# Patient Record
Sex: FEMALE | NOT HISPANIC OR LATINO | Employment: FULL TIME | URBAN - METROPOLITAN AREA
[De-identification: names, ages, dates, MRNs, and addresses within clinical notes are randomized per-mention and may not be internally consistent; named-entity substitution may affect disease eponyms.]

---

## 2018-03-01 ENCOUNTER — OFFICE VISIT (OUTPATIENT)
Dept: PSYCHIATRY | Facility: CLINIC | Age: 58
End: 2018-03-01
Attending: NURSE PRACTITIONER
Payer: COMMERCIAL

## 2018-03-01 VITALS — WEIGHT: 141.6 LBS | HEART RATE: 87 BPM | SYSTOLIC BLOOD PRESSURE: 129 MMHG | DIASTOLIC BLOOD PRESSURE: 84 MMHG

## 2018-03-01 DIAGNOSIS — F43.10 PTSD (POST-TRAUMATIC STRESS DISORDER): Primary | ICD-10-CM

## 2018-03-01 PROCEDURE — G0463 HOSPITAL OUTPT CLINIC VISIT: HCPCS | Mod: ZF

## 2018-03-01 RX ORDER — DOXYCYCLINE HYCLATE 20 MG
20 TABLET ORAL 2 TIMES DAILY
COMMUNITY

## 2018-03-01 ASSESSMENT — PAIN SCALES - GENERAL: PAINLEVEL: NO PAIN (0)
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
## 2018-03-01 NOTE — NURSING NOTE
"Chief Complaint   Patient presents with     Recheck Medication     PTSD     Reviewed allergies, medications, pharmacy and smoking status.  Administered abuse screening questions     Obtained weight, pain level, blood pressure and heart rate     Patient lost balance while getting weight - says other providers \"dont' seem concerned\" \"my  is really concerned\" \"I haven't fallen.      "

## 2018-03-01 NOTE — MR AVS SNAPSHOT
After Visit Summary   3/1/2018    Preethi Mays    MRN: [id number]           Patient Information     Date Of Birth          1960        Visit Information        Provider Department      3/1/2018 3:30 PM Cherrie Davis APRN [facility]        Today's Diagnoses     PTSD (post-traumatic stress disorder)    -  1       Follow-ups after your visit        Follow-up notes from your care team     Return if symptoms worsen or fail to improve, for Routine Visit.      Who to contact     Please call your clinic at [phone] to:    Ask questions about your health    Make or cancel appointments    Discuss your medicines    Learn about your test results    Speak to your doctor            Additional Information About Your Visit        MyChart Information     Summifyt is an electronic gateway that provides easy, online access to your medical records. With [facility], you can request a clinic appointment, read your test results, renew a prescription or communicate with your care team.     To sign up for Summifyt visit the website at [email or web address]   You will be asked to enter the access code listed below, as well as some personal information. Please follow the directions to create your username and password.     Your access code is: [email or web address]  Expires: 2018 10:59 AM     Your access code will  in 90 days. If you need help or a new code, please contact your [facility] or call [phone] for assistance.        Care EveryWhere ID     This is your Care EveryWhere ID. This could be used by other organizations to access your [facility] medical records  [id number]        Your Vitals Were     Pulse                   87            Blood Pressure from Last 3 Encounters:   18 129/84    Weight from Last 3 Encounters:   18 64.2 kg (141 lb 9.6 oz)              Today, you had the following     No orders found for display       Primary Care  Provider Office Phone # Fax #    [facility] Hathaway [phone]       IVAN RATNA OSCAR [address] [facility] [address] [address]        Equal Access to Services     JYOTI CURRAN : Hadii aad ku hadcalixtoo Soomaali, waaxda luqadaha, qaybta kaalmada adeegyada, waxay idiin hayruslann adeakil blake laphillip will. So [facility] [phone].    ATENCIÓN: Si habla español, tiene a armas disposición servicios gratuitos de asistencia lingüística. Llame al [phone].    We comply with applicable federal civil rights laws and [place] laws. We do not discriminate on the basis of race, color, national origin, age, disability, sex, sexual orientation, or gender identity.            Thank you!     Thank you for choosing PSYCHIATRY CLINIC  for your care. Our goal is always to provide you with excellent care. Hearing back from our patients is one way we can continue to improve our services. Please take a few minutes to complete the written survey that you may receive in the mail after your visit with us. Thank you!             Your Updated Medication List - Protect others around you: Learn how to safely use, store and throw away your medicines at www.disposemymeds.org.          This list is accurate as of 3/1/18 11:59 PM.  Always use your most recent med list.                   Brand Name Dispense Instructions for use Diagnosis    doxycycline 20 MG tablet    PERIOSTAT     Take 20 mg by mouth 2 times daily        MAXALT PO      Take 10 mg by mouth as needed for migraine        metroNIDAZOLE 1 % cream    NORITATE     Apply topically daily

## 2018-03-02 ENCOUNTER — TELEPHONE (OUTPATIENT)
Dept: PSYCHIATRY | Facility: CLINIC | Age: 58
End: 2018-03-02

## 2018-03-02 NOTE — TELEPHONE ENCOUNTER
On 3/1/2018 the patient signed an CASSIDY authorizing the release of records from [facility] to [facility].  I faxed the form to [phone], sent the CASSIDY to scanning and held a copy is Psychiatry until scanning complete/confirmed.Shelley Hutchison/MALLORY

## 2018-03-03 ASSESSMENT — PATIENT HEALTH QUESTIONNAIRE - PHQ9: SUM OF ALL RESPONSES TO PHQ QUESTIONS 1-9: 5

## 2018-03-08 ENCOUNTER — TELEPHONE (OUTPATIENT)
Dept: PSYCHIATRY | Facility: CLINIC | Age: 58
End: 2018-03-08

## 2018-03-08 DIAGNOSIS — F43.10 PTSD (POST-TRAUMATIC STRESS DISORDER): Primary | ICD-10-CM

## 2018-03-08 RX ORDER — BUSPIRONE HYDROCHLORIDE 5 MG/1
5 TABLET ORAL 2 TIMES DAILY
Qty: 60 TABLET | Refills: 0 | Status: SHIPPED | OUTPATIENT
Start: 2018-03-08 | End: 2018-04-05

## 2018-03-08 NOTE — TELEPHONE ENCOUNTER
----- Message from RICHARD Cantu CNP sent at 3/8/2018  5:39 PM CST -----  Regarding: RE: pt update/question  Contact: [phone]  Left Whit a detailed message to take buspar 5mg BID and if she needed to, she could take 5mg (0.5) BID x 3 days then retrial full tab BID. Because she is nervous and psychotropic naive, started her lower than usual.    Asked her to carefully observe vs obsess her tolerability and  response to the med, something that's hard for those of us who manage anxiety. Asked her to be patient in the process and take consistently for several weeks before she might expect to feel differently.    Asked her to call intake to reschedule in 3-3.5 weeks for med visit. Invited a call back if she had questions.    ----- Message -----     From: Kely Colby RN     Sent: 3/8/2018   4:51 PM       To: RICHARD Cantu CNP  Subject: FW: pt update/question                           Thank you again!!!    ----- Message -----     From: Marie Schwab     Sent: 3/7/2018   3:20 PM       To: Kely Colby RN  Subject: pt update/question                               Pt decided to go ahead with Buspar, hasn't started taking it, was wondering if she needs to talk to Cherrie first. Okay to leave a detailed vm.

## 2018-04-05 ENCOUNTER — OFFICE VISIT (OUTPATIENT)
Dept: PSYCHIATRY | Facility: CLINIC | Age: 58
End: 2018-04-05
Attending: NURSE PRACTITIONER
Payer: COMMERCIAL

## 2018-04-05 VITALS — SYSTOLIC BLOOD PRESSURE: 129 MMHG | HEART RATE: 83 BPM | DIASTOLIC BLOOD PRESSURE: 83 MMHG | WEIGHT: 140.8 LBS

## 2018-04-05 DIAGNOSIS — F43.10 PTSD (POST-TRAUMATIC STRESS DISORDER): ICD-10-CM

## 2018-04-05 PROCEDURE — G0463 HOSPITAL OUTPT CLINIC VISIT: HCPCS | Mod: ZF

## 2018-04-05 RX ORDER — BUSPIRONE HYDROCHLORIDE 5 MG/1
5 TABLET ORAL 2 TIMES DAILY
Qty: 60 TABLET | Refills: 1 | Status: SHIPPED | OUTPATIENT
Start: 2018-04-05 | End: 2018-05-17

## 2018-04-05 ASSESSMENT — PAIN SCALES - GENERAL: PAINLEVEL: NO PAIN (0)

## 2018-04-05 NOTE — NURSING NOTE
Chief Complaint   Patient presents with     Recheck Medication      PTSD (post-traumatic stress disorder)
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
## 2018-04-05 NOTE — MR AVS SNAPSHOT
After Visit Summary   2018    Preethi Mays    MRN: [id number]           Patient Information     Date Of Birth          1960        Visit Information        Provider Department      2018 3:00 PM Cherrie Davis APRN CNP Psychiatry Clinic        Today's Diagnoses     PTSD (post-traumatic stress disorder)           Follow-ups after your visit        Follow-up notes from your care team     Return in about 6 weeks (around 2018), or if symptoms worsen or fail to improve, for Routine Visit.      Your next 10 appointments already scheduled     May 17, 2018  2:00 PM CDT   Adult Med Follow UP with RICHARD Cantu CNP   Psychiatry Clinic ([facility])    [place] [id number] [address] [phone]              Who to contact     Please call your clinic at [phone] to:    Ask questions about your health    Make or cancel appointments    Discuss your medicines    Learn about your test results    Speak to your doctor            Additional Information About Your Visit        MyChart Information     Myrl is an electronic gateway that provides easy, online access to your medical records. With Myrl, you can request a clinic appointment, read your test results, renew a prescription or communicate with your care team.     To sign up for Hector Beveragest visit the website at [email or web address]   You will be asked to enter the access code listed below, as well as some personal information. Please follow the directions to create your username and password.     Your access code is: [email or web address]  Expires: 2018 11:59 AM     Your access code will  in 90 days. If you need help or a new code, please contact your [facility] or call [phone] for assistance.        Care EveryWhere ID     This is your Care EveryWhere ID. This could be used by other organizations to access your  [facility] medical records  [id number]        Your Vitals Were     Pulse                   83            Blood Pressure from Last 3 Encounters:   04/05/18 129/83   03/01/18 129/84    Weight from Last 3 Encounters:   04/05/18 63.9 kg (140 lb 12.8 oz)   03/01/18 64.2 kg (141 lb 9.6 oz)              Today, you had the following     No orders found for display         Where to get your medicines      These medications were sent to [facility] #[id number] - [place], [place] - [address]  [address] [facility] [address]     Phone:  [phone]     busPIRone 5 MG tablet          Primary Care Provider Office Phone # Fax #    Octaviano Hathaway -[phone]       IVAN MOORE [address] [facility] [address]  [facility] [address]        Equal Access to Services     [facility]: Hadii aad ku hadasho Soomaali, waaxda luqadaha, qaybta kaalmada adeegyada, waxay garretin hayaan leida santiago . So [facility] [phone].    ATENCIÓN: Si habla español, tiene a armas disposición servicios gratuitos de asistencia lingüística. Catherine al [phone].    We comply with applicable federal civil rights laws and [place] laws. We do not discriminate on the basis of race, color, national origin, age, disability, sex, sexual orientation, or gender identity.            Thank you!     Thank you for choosing PSYCHIATRY CLINIC  for your care. Our goal is always to provide you with excellent care. Hearing back from our patients is one way we can continue to improve our services. Please take a few minutes to complete the written survey that you may receive in the mail after your visit with us. Thank you!             Your Updated Medication List - Protect others around you: Learn how to safely use, store and throw away your medicines at www.disposemymeds.org.          This list is accurate as of 4/5/18 11:59 PM.  Always use your most recent med list.                   Brand Name Dispense Instructions for use  Diagnosis    busPIRone 5 MG tablet    BUSPAR    60 tablet    Take 1 tablet (5 mg) by mouth 2 times daily    PTSD (post-traumatic stress disorder)       doxycycline 20 MG tablet    PERIOSTAT     Take 20 mg by mouth 2 times daily        MAXALT PO      Take 10 mg by mouth as needed for migraine        metroNIDAZOLE 1 % cream    NORITATE     Apply topically daily

## 2018-04-06 ASSESSMENT — PATIENT HEALTH QUESTIONNAIRE - PHQ9: SUM OF ALL RESPONSES TO PHQ QUESTIONS 1-9: 3

## 2018-05-17 ENCOUNTER — OFFICE VISIT (OUTPATIENT)
Dept: PSYCHIATRY | Facility: CLINIC | Age: 58
End: 2018-05-17
Attending: NURSE PRACTITIONER
Payer: COMMERCIAL

## 2018-05-17 VITALS — DIASTOLIC BLOOD PRESSURE: 65 MMHG | SYSTOLIC BLOOD PRESSURE: 107 MMHG | HEART RATE: 82 BPM | WEIGHT: 141.8 LBS

## 2018-05-17 DIAGNOSIS — F43.10 PTSD (POST-TRAUMATIC STRESS DISORDER): ICD-10-CM

## 2018-05-17 PROCEDURE — G0463 HOSPITAL OUTPT CLINIC VISIT: HCPCS | Mod: ZF

## 2018-05-17 RX ORDER — BUSPIRONE HYDROCHLORIDE 5 MG/1
5 TABLET ORAL 2 TIMES DAILY
Qty: 60 TABLET | Refills: 2 | Status: SHIPPED | OUTPATIENT
Start: 2018-05-17 | End: 2018-07-02

## 2018-05-17 ASSESSMENT — PAIN SCALES - GENERAL: PAINLEVEL: NO PAIN (0)

## 2018-05-17 NOTE — MR AVS SNAPSHOT
After Visit Summary   5/17/2018    Preethi Mays    MRN: [id number]           Patient Information     Date Of Birth          1960        Visit Information        Provider Department      5/17/2018 2:00 PM Cherrie Davis APRN [facility]        Today's Diagnoses     PTSD (post-traumatic stress disorder)          Care Instructions    Thank you for coming to the PSYCHIATRY CLINIC.    Lab Testing:  If you had lab testing today and your results are reassuring or normal they will be mailed to you or sent through [email or web address] within 7 days.   If the lab tests need quick action we will call you with the results.  The phone number we will call with results is # [phone] (home) . If this is not the best number please call our clinic and change the number.    Medication Refills:  If you need any refills please call your pharmacy and they will contact us. Our fax number for refills is [phone]. Please allow three business for refill processing.   If you need to  your refill at a new pharmacy, please contact the new pharmacy directly. The new pharmacy will help you get your medications transferred.     Scheduling:  If you have any concerns about today's visit or wish to schedule another appointment please call our office during normal business hours [phone] (8-5:00 M-F)    Contact Us:  Please call [phone] during business hours (8-5:00 M-F).  If after clinic hours, or on the weekend, please call  [phone].    Financial Assistance [phone]  [employer] Billing [phone]  [facility] Billing [phone]  Medical Records [phone]      MENTAL HEALTH CRISIS NUMBERS:  [facility]:   [facility] - [phone]   Crisis Residence hospitals - [facility] - [phone]   Walk-In Counseling Center hospitals - [phone]   COPE 24/7 [place] Mobile Team for Adults - [[phone]]; Child - [[phone]]     Crisis Connection - [phone]      [facility]:   [facility] - [phone]   Walk-in counseling [facility] - [phone]   Walk-in counseling [facility] - [phone]   Crisis Residence [facility] - [phone]   Urgent Care Adult Mental Health:   --Drop-in, 24/7 crisis line, and [place] Mobile Team [[phone]]    CRISIS TEXT LINE: Text [phone] from anywhere, anytime, any crisis 24/7;    OR SEE www.crisistextline.org     Poison Control Center - [phone]    CHILD: [facility] needs assessment team - [phone]     [facility] Lifeline - [phone]; or [facility] Lifeline - [phone]    If you have a medical emergency please call 911or go to the nearest ER.                    _____________________________________________    Again thank you for choosing PSYCHIATRY CLINIC and please let us know how we can best partner with you to improve you and your family's health.  You may be receiving a survey in the mail regarding this appointment. We would love to have your feedback, both positive and negative, so please fill out the survey and return it using the provided envolpe. The survey is done by an external company, so your answers are anonymous.             Follow-ups after your visit        Your next 10 appointments already scheduled     Aug 20, 2018 10:30 AM CDT   Adult Med Follow UP with RICHARD Cantu CNP   Psychiatry Clinic ([facility])    [address] [place] [address] [phone]              Who to contact     Please call your clinic at [phone] to:    Ask questions about your health    Make or cancel appointments    Discuss your medicines    Learn about your test results    Speak to your doctor            Additional Information About Your Visit        MyChart Information     MEDNAX is an electronic gateway that provides easy, online access to your medical records. With  [employer], you can request a clinic appointment, read your test results, renew a prescription or communicate with your care team.     To sign up for [employer] visit the website at [email or web address]   You will be asked to enter the access code listed below, as well as some personal information. Please follow the directions to create your username and password.     Your access code is: [email or web address]  Expires: 2018 11:59 AM     Your access code will  in 90 days. If you need help or a new code, please contact your [facility] or call [phone] for assistance.        Care EveryWhere ID     This is your Care EveryWhere ID. This could be used by other organizations to access your [facility] medical records  [id number]        Your Vitals Were     Pulse                   82            Blood Pressure from Last 3 Encounters:   18 107/65   18 129/83   18 129/84    Weight from Last 3 Encounters:   18 64.3 kg (141 lb 12.8 oz)   18 63.9 kg (140 lb 12.8 oz)   18 64.2 kg (141 lb 9.6 oz)              Today, you had the following     No orders found for display         Where to get your medicines      These medications were sent to [facility] #[id number] - [facility] [address]   [address] [facility] [address]     Phone:  [phone]     busPIRone 5 MG tablet          Primary Care Provider Office Phone # Fax #    Octaviano Hathaway [phone]       IVAN MOORE [address] [facility] [address]  [facility] [address]        Equal Access to Services     [facility]: Hadii alem Eddy, wagoyoda luqadaha, qaybta kaalmada bev, delores will. So [facility] [phone].    ATENCIÓN: Si habla español, tiene a armas disposición servicios gratuitos de asistencia lingüística. Llame al [phone].    We comply with applicable federal civil rights laws and [place] laws. We do not  discriminate on the basis of race, color, national origin, age, disability, sex, sexual orientation, or gender identity.            Thank you!     Thank you for choosing PSYCHIATRY CLINIC  for your care. Our goal is always to provide you with excellent care. Hearing back from our patients is one way we can continue to improve our services. Please take a few minutes to complete the written survey that you may receive in the mail after your visit with us. Thank you!             Your Updated Medication List - Protect others around you: Learn how to safely use, store and throw away your medicines at www.disposemymeds.org.          This list is accurate as of 5/17/18  2:40 PM.  Always use your most recent med list.                   Brand Name Dispense Instructions for use Diagnosis    busPIRone 5 MG tablet    BUSPAR    60 tablet    Take 1 tablet (5 mg) by mouth 2 times daily    PTSD (post-traumatic stress disorder)       doxycycline 20 MG tablet    PERIOSTAT     Take 20 mg by mouth 2 times daily        MAXALT PO      Take 10 mg by mouth as needed for migraine        metroNIDAZOLE 1 % cream    NORITATE     Apply topically daily

## 2018-05-17 NOTE — PATIENT INSTRUCTIONS
Thank you for coming to the PSYCHIATRY CLINIC.    Lab Testing:  If you had lab testing today and your results are reassuring or normal they will be mailed to you or sent through Vide[employer]ot within 7 days.   If the lab tests need quick action we will call you with the results.  The phone number we will call with results is # [phone] (home) . If this is not the best number please call our clinic and change the number.    Medication Refills:  If you need any refills please call your pharmacy and they will contact us. Our fax number for refills is [phone]. Please allow three business for refill processing.   If you need to  your refill at a new pharmacy, please contact the new pharmacy directly. The new pharmacy will help you get your medications transferred.     Scheduling:  If you have any concerns about today's visit or wish to schedule another appointment please call our office during normal business hours [phone] (8-5:00 M-F)    Contact Us:  Please call [phone] during business hours (8-5:00 M-F).  If after clinic hours, or on the weekend, please call  [phone].    Financial Assistance [phone]  [employer] Billing [phone]  [employer] Billing [phone]  Medical Records [phone]      MENTAL HEALTH CRISIS NUMBERS:  [facility]:   [facility] - [phone]   Crisis Residence [facility] - [phone]   Walk-In Counseling [facility] [phone]   COPE 24/7 [place] Mobile Team for Adults - [[phone]]; Child - [[phone]]     Crisis Connection - [phone]     [facility]:   [facility] - [phone]   Walk-in counseling [facility] - [phone]   Walk-in counseling [facility] - [phone]   Crisis Residence [facility] - [phone]   Urgent Care Adult Mental Health:   --Drop-in, 24/7 crisis line, and [place] Mobile Team [[phone]]    CRISIS TEXT  LINE: Text [phone] from anywhere, anytime, any crisis 24/7;    OR SEE www.crisistextline.org     Poison Control Center - [phone]    CHILD: [facility] needs assessment team - [phone]     [facility] - [phone]; or [employer] Lifeline - [phone]    If you have a medical emergency please call 911or go to the nearest ER.                    _____________________________________________    Again thank you for choosing PSYCHIATRY CLINIC and please let us know how we can best partner with you to improve you and your family's health.  You may be receiving a survey in the mail regarding this appointment. We would love to have your feedback, both positive and negative, so please fill out the survey and return it using the provided envolpe. The survey is done by an external company, so your answers are anonymous.
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
## 2018-05-19 ASSESSMENT — PATIENT HEALTH QUESTIONNAIRE - PHQ9: SUM OF ALL RESPONSES TO PHQ QUESTIONS 1-9: 4

## 2018-06-29 ENCOUNTER — TELEPHONE (OUTPATIENT)
Dept: PSYCHIATRY | Facility: CLINIC | Age: 58
End: 2018-06-29

## 2018-06-29 NOTE — TELEPHONE ENCOUNTER
Message  Received: Today       Cherrie Davis APRN CNP Pratt, Laura, RN       Cc: Cherrie Davis APRN CNP       Caller: Unspecified (Today, 10:17 AM)                     I'll call her Monday.

## 2018-07-02 DIAGNOSIS — F43.10 PTSD (POST-TRAUMATIC STRESS DISORDER): ICD-10-CM

## 2018-07-02 RX ORDER — BUSPIRONE HYDROCHLORIDE 7.5 MG/1
7.5 TABLET ORAL 2 TIMES DAILY
Qty: 60 TABLET | Refills: 1 | Status: SHIPPED | OUTPATIENT
Start: 2018-07-02 | End: 2018-08-24
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
## 2018-08-24 ENCOUNTER — OFFICE VISIT (OUTPATIENT)
Dept: PSYCHIATRY | Facility: CLINIC | Age: 58
End: 2018-08-24
Attending: NURSE PRACTITIONER
Payer: COMMERCIAL

## 2018-08-24 VITALS — DIASTOLIC BLOOD PRESSURE: 75 MMHG | HEART RATE: 81 BPM | SYSTOLIC BLOOD PRESSURE: 119 MMHG | WEIGHT: 142 LBS

## 2018-08-24 DIAGNOSIS — F43.10 PTSD (POST-TRAUMATIC STRESS DISORDER): ICD-10-CM

## 2018-08-24 PROCEDURE — G0463 HOSPITAL OUTPT CLINIC VISIT: HCPCS | Mod: ZF

## 2018-08-24 RX ORDER — BUSPIRONE HYDROCHLORIDE 7.5 MG/1
7.5 TABLET ORAL 2 TIMES DAILY
Qty: 60 TABLET | Refills: 2 | Status: SHIPPED | OUTPATIENT
Start: 2018-08-24 | End: 2018-11-16

## 2018-08-24 ASSESSMENT — PAIN SCALES - GENERAL: PAINLEVEL: NO PAIN (0)

## 2018-08-24 NOTE — PROGRESS NOTES
Outpatient Psychiatry Progress Note     Provider: RICHARD Gaines CNP  Date: 2018  Therapist: Samantha Bermudez  Service:  Medication management.   Patient Identification: Preethi Mays  : 1960   MRN: [id number]    Preethi Mays is a 57 year old female who presents for ongoing psychiatric care.  Preethi was last seen in clinic on 18 and at that time, she chose to continue buspar 5mg BID.    On 18, she chose to increase buspar to 7.5mg BID.    2018  Today Preethi reports she is well.  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 [place]  - good support from her   - walks her dog for 35-40 min daily, likes elliptical > biking    Compliance: daily, uses pill divider  Side effects: denies    Psychiatric Review of Systems:  Depression: denies  Anxiety: less irritability, fewer racing thoughts at night  PTSD: tearful discussing EMDR and past trauma     Lethality: denies    Review of Medical Systems:  Appetite: OK, weight stable  Sleep: improved, sleeping longer (7 hours) and more restfully  Self Care: encouraged, enjoys being creative, exercise, sings opera  Energy: intact  Concentration: intact    Current Substance Use:    Social History     Social History     Marital status:      Spouse name: N/A     Number of children: N/A     Years of education: N/A     Social History Main Topics     Smoking status: Never Smoker      Smokeless tobacco: Never Used     Alcohol use None     Drug use: None     Sexual activity: Not Asked     Other Topics Concern     None     Social History Narrative     Limits alcohol. Continues limiting coffee to promote sleep.    Past Medical History: No past medical history on file.    Medical health update: none today    Allergies:   Allergies   Allergen Reactions     Keflex [Cephalexin]      Penicillins      Sulfa Drugs         Current Medications     Current Outpatient Prescriptions Ordered in [facility]   Medication Sig Dispense Refill     busPIRone (BUSPAR) 7.5 MG tablet Take 1 tablet (7.5 mg) by mouth 2 times daily 60 tablet 1     doxycycline (PERIOSTAT) 20 MG tablet Take 20 mg by mouth 2 times daily       metroNIDAZOLE (NORITATE) 1 % cream Apply topically daily       Rizatriptan Benzoate (MAXALT PO) Take 10 mg by mouth as needed for migraine       No current [facility]-ordered facility-administered medications on file.       Mental Status Exam     Appearance:  Casually dressed, Well groomed, Dressed appropriately for weather and Appears stated age  Behavior/relationship to examiner/demeanor:  Cooperative, Engaged and Pleasant  Orientation: Oriented to person, place, time and situation  Psychomotor: WNL  Speech Rate:  Normal  Speech Spontaneity:  Normal  Mood:  better  Affect:  Appropriate/mood-congruent and Bright  Thought Process (Associations):  Goal directed  Thought Content:  no evidence of suicidal or homicidal ideation, no overt psychosis, denies suicidal ideation, intent or thoughts, patient denies auditory and visual hallucinations, patient does not appear to be responding to internal stimuli and denies suicidal intent or plan  Abnormal Perception:  None  Attention/Concentration:  Normal  Language:  Intact  Insight:  Good and Fair  Judgment:  Good      Results     Vital signs: /75  Pulse 81  Wt 64.4 kg (142 lb)    Laboratory Data:   Lab Results   Component Value Date    WBC 12.5 (H) 01/27/2010    HGB  8.9 (L) 01/29/2010    HCT 32.1 (L) 01/27/2010     01/27/2010     01/27/2010    BUN 19 01/27/2010    CO2 25 01/27/2010    INR 1.01 01/27/2010     Assessment & Plan     Preethi is seen today for follow up.       Diagnosis  Axis 1: PTSD  Axis 2: none      Plan:  Medication: continue buspar 7.5mg BID  OTC Recommendations: none  Other: none  Lab Orders: none  Referrals: active in weekly therapy, EMDR with Samantha Bermudez  Release of Information: none  Future Treatment Considerations: monitor tolerability and efficacy  Return for Follow Up: 12 weeks, sooner as needed    She voices understanding of the treatment plan including discussion of options, risks/ benefits. She has clinic contact information and will seek emergency services if urgent or life threatening symptoms present. Preethi understands risks associated with drug and alcohol use.     Visit was spent counseling the patient and/or coordinating care regarding review of social and occupational functioning.  In addition patient was counseled on health and wellness practices to augment medication treatment of symptoms. See note for details.    PHQ-9 score:  3  PHQ-9 SCORE 5/17/2018   Total Score 4     GAD7: No flowsheet data found.  : 3/2018  Cherrie Davis, APRN CNP 8/24/2018

## 2018-08-24 NOTE — MR AVS SNAPSHOT
After Visit Summary   2018    Preethi Mays    MRN: [id number]           Patient Information     Date Of Birth          1960        Visit Information        Provider Department      2018 8:30 AM Cherrie Davis APRN CNP Psychiatry Clinic        Today's Diagnoses     PTSD (post-traumatic stress disorder)           Follow-ups after your visit        Follow-up notes from your care team     Return in about 12 weeks (around 2018), or if symptoms worsen or fail to improve, for Routine Visit.      Your next 10 appointments already scheduled     2018  9:00 AM CST   Adult Med Follow UP with RICHARD Cantu CNP   Psychiatry Clinic ([facility])    [place] [id number] [address] [phone]              Who to contact     Please call your clinic at [phone] to:    Ask questions about your health    Make or cancel appointments    Discuss your medicines    Learn about your test results    Speak to your doctor            Additional Information About Your Visit        MyChart Information     [employer] is an electronic gateway that provides easy, online access to your medical records. With [employer], you can request a clinic appointment, read your test results, renew a prescription or communicate with your care team.     To sign up for [employer] visit the website at [email or web address]   You will be asked to enter the access code listed below, as well as some personal information. Please follow the directions to create your username and password.     Your access code is: [email or web address]  Expires: 2018  8:26 AM     Your access code will  in 90 days. If you need help or a new code, please contact your [facility] or call [phone] for assistance.        Care EveryWhere ID     This is your Care EveryWhere ID. This could be used by other organizations to access  your [place] medical records  [id number]        Your Vitals Were     Pulse                   81            Blood Pressure from Last 3 Encounters:   08/24/18 119/75   05/17/18 107/65   04/05/18 129/83    Weight from Last 3 Encounters:   08/24/18 64.4 kg (142 lb)   05/17/18 64.3 kg (141 lb 12.8 oz)   04/05/18 63.9 kg (140 lb 12.8 oz)              Today, you had the following     No orders found for display         Where to get your medicines      These medications were sent to [id number] - [place], [place] - [address]  [address], [facility] [address]     Phone:  [phone]     busPIRone 7.5 MG tablet          Primary Care Provider Office Phone # Fax #    Octaviano Hathwaay [phone]       [facility] MO[facility] [address]  [place] [address]        Equal Access to Services     [facility] : Hadii aad ku hadasho Soomaali, waaxda luqadaha, qaybta kaalmada adeegyada, waxay idiin hayaan leida santiago . So [facility] [phone].    ATENCIÓN: Si habla español, tiene a armas disposición servicios gratuitos de asistencia lingüística. [facility] [phone].    We comply with applicable federal civil rights laws and [place] laws. We do not discriminate on the basis of race, color, national origin, age, disability, sex, sexual orientation, or gender identity.            Thank you!     Thank you for choosing PSYCHIATRY CLINIC  for your care. Our goal is always to provide you with excellent care. Hearing back from our patients is one way we can continue to improve our services. Please take a few minutes to complete the written survey that you may receive in the mail after your visit with us. Thank you!             Your Updated Medication List - Protect others around you: Learn how to safely use, store and throw away your medicines at www.disposemymeds.org.          This list is accurate as of 8/24/18 11:59 PM.  Always use your most recent med list.                    Brand Name Dispense Instructions for use Diagnosis    busPIRone 7.5 MG tablet    BUSPAR    60 tablet    Take 1 tablet (7.5 mg) by mouth 2 times daily    PTSD (post-traumatic stress disorder)       doxycycline 20 MG tablet    PERIOSTAT     Take 20 mg by mouth 2 times daily        MAXALT PO      Take 10 mg by mouth as needed for migraine        metroNIDAZOLE 1 % cream    NORITATE     Apply topically daily

## 2018-08-25 ASSESSMENT — PATIENT HEALTH QUESTIONNAIRE - PHQ9: SUM OF ALL RESPONSES TO PHQ QUESTIONS 1-9: 3

## 2018-09-01 ENCOUNTER — HEALTH MAINTENANCE LETTER (OUTPATIENT)
Age: 58
End: 2018-09-01

## 2018-10-13 ENCOUNTER — APPOINTMENT (OUTPATIENT)
Dept: GENERAL RADIOLOGY | Facility: CLINIC | Age: 58
End: 2018-10-13
Attending: EMERGENCY MEDICINE
Payer: COMMERCIAL

## 2018-10-13 ENCOUNTER — HOSPITAL ENCOUNTER (OUTPATIENT)
Facility: CLINIC | Age: 58
Setting detail: OBSERVATION
Discharge: HOME | End: 2018-10-14
Attending: EMERGENCY MEDICINE | Admitting: INTERNAL MEDICINE
Payer: COMMERCIAL

## 2018-10-13 DIAGNOSIS — K59.00 CONSTIPATION, UNSPECIFIED CONSTIPATION TYPE: Primary | ICD-10-CM

## 2018-10-13 DIAGNOSIS — S82.891A CLOSED FRACTURE OF RIGHT ANKLE, INITIAL ENCOUNTER: ICD-10-CM

## 2018-10-13 DIAGNOSIS — S62.102A CLOSED FRACTURE OF LEFT WRIST, INITIAL ENCOUNTER: ICD-10-CM

## 2018-10-13 PROBLEM — G43.009 MIGRAINE WITHOUT AURA: Status: ACTIVE | Noted: 2018-10-13

## 2018-10-13 PROBLEM — S62.109A WRIST FRACTURE: Status: ACTIVE | Noted: 2018-10-13

## 2018-10-13 PROCEDURE — 99285 EMERGENCY DEPT VISIT HI MDM: CPT | Mod: 25

## 2018-10-13 PROCEDURE — 99152 MOD SED SAME PHYS/QHP 5/>YRS: CPT

## 2018-10-13 PROCEDURE — 40000986 XR WRIST LT  2 VW: Mod: LT

## 2018-10-13 PROCEDURE — G0378 HOSPITAL OBSERVATION PER HR: HCPCS

## 2018-10-13 PROCEDURE — 25000128 H RX IP 250 OP 636: Performed by: EMERGENCY MEDICINE

## 2018-10-13 PROCEDURE — 96374 THER/PROPH/DIAG INJ IV PUSH: CPT | Mod: 59

## 2018-10-13 PROCEDURE — 25605 CLTX DST RDL FX/EPHYS SEP W/: CPT | Mod: LT

## 2018-10-13 PROCEDURE — 73610 X-RAY EXAM OF ANKLE: CPT | Mod: RT

## 2018-10-13 PROCEDURE — 29515 APPLICATION SHORT LEG SPLINT: CPT | Mod: RT

## 2018-10-13 PROCEDURE — 99220 ZZC INITIAL OBSERVATION CARE,LEVL III: CPT | Performed by: INTERNAL MEDICINE

## 2018-10-13 PROCEDURE — 96375 TX/PRO/DX INJ NEW DRUG ADDON: CPT | Mod: 59

## 2018-10-13 PROCEDURE — 73110 X-RAY EXAM OF WRIST: CPT | Mod: LT

## 2018-10-13 RX ORDER — PROPOFOL 10 MG/ML
120 INJECTION, EMULSION INTRAVENOUS ONCE
Status: COMPLETED | OUTPATIENT
Start: 2018-10-13 | End: 2018-10-13

## 2018-10-13 RX ORDER — AMOXICILLIN 250 MG
1 CAPSULE ORAL 2 TIMES DAILY PRN
Status: DISCONTINUED | OUTPATIENT
Start: 2018-10-13 | End: 2018-10-14 | Stop reason: HOSPADM

## 2018-10-13 RX ORDER — PROPOFOL 10 MG/ML
INJECTION, EMULSION INTRAVENOUS
Status: DISCONTINUED
Start: 2018-10-13 | End: 2018-10-13 | Stop reason: HOSPADM

## 2018-10-13 RX ORDER — HYDROCODONE BITARTRATE AND ACETAMINOPHEN 5; 325 MG/1; MG/1
1-2 TABLET ORAL EVERY 4 HOURS PRN
Status: DISCONTINUED | OUTPATIENT
Start: 2018-10-13 | End: 2018-10-14 | Stop reason: HOSPADM

## 2018-10-13 RX ORDER — ONDANSETRON 2 MG/ML
4 INJECTION INTRAMUSCULAR; INTRAVENOUS EVERY 6 HOURS PRN
Status: DISCONTINUED | OUTPATIENT
Start: 2018-10-13 | End: 2018-10-14 | Stop reason: HOSPADM

## 2018-10-13 RX ORDER — NALOXONE HYDROCHLORIDE 0.4 MG/ML
.1-.4 INJECTION, SOLUTION INTRAMUSCULAR; INTRAVENOUS; SUBCUTANEOUS
Status: DISCONTINUED | OUTPATIENT
Start: 2018-10-13 | End: 2018-10-14 | Stop reason: HOSPADM

## 2018-10-13 RX ORDER — BUSPIRONE HYDROCHLORIDE 7.5 MG/1
7.5 TABLET ORAL 2 TIMES DAILY
Status: DISCONTINUED | OUTPATIENT
Start: 2018-10-13 | End: 2018-10-14 | Stop reason: HOSPADM

## 2018-10-13 RX ORDER — CHLORAL HYDRATE 500 MG
1 CAPSULE ORAL DAILY
COMMUNITY
End: 2021-11-05

## 2018-10-13 RX ORDER — NAPROXEN SODIUM 220 MG
440 TABLET ORAL DAILY PRN
Status: ON HOLD | COMMUNITY
End: 2018-10-14

## 2018-10-13 RX ORDER — CYCLOBENZAPRINE HCL 5 MG
5 TABLET ORAL DAILY PRN
COMMUNITY

## 2018-10-13 RX ORDER — HYDROMORPHONE HYDROCHLORIDE 1 MG/ML
0.5 INJECTION, SOLUTION INTRAMUSCULAR; INTRAVENOUS; SUBCUTANEOUS ONCE
Status: COMPLETED | OUTPATIENT
Start: 2018-10-13 | End: 2018-10-13

## 2018-10-13 RX ORDER — ACETAMINOPHEN 650 MG/1
650 SUPPOSITORY RECTAL EVERY 4 HOURS PRN
Status: DISCONTINUED | OUTPATIENT
Start: 2018-10-13 | End: 2018-10-14 | Stop reason: HOSPADM

## 2018-10-13 RX ORDER — AMOXICILLIN 250 MG
2 CAPSULE ORAL 2 TIMES DAILY PRN
Status: DISCONTINUED | OUTPATIENT
Start: 2018-10-13 | End: 2018-10-14 | Stop reason: HOSPADM

## 2018-10-13 RX ORDER — DOXYCYCLINE HYCLATE 20 MG
20 TABLET ORAL 2 TIMES DAILY
Status: DISCONTINUED | OUTPATIENT
Start: 2018-10-13 | End: 2018-10-14 | Stop reason: HOSPADM

## 2018-10-13 RX ORDER — ACETAMINOPHEN 325 MG/1
650 TABLET ORAL EVERY 4 HOURS PRN
Status: DISCONTINUED | OUTPATIENT
Start: 2018-10-13 | End: 2018-10-14 | Stop reason: HOSPADM

## 2018-10-13 RX ORDER — ONDANSETRON 2 MG/ML
INJECTION INTRAMUSCULAR; INTRAVENOUS
Status: DISCONTINUED
Start: 2018-10-13 | End: 2018-10-13 | Stop reason: HOSPADM

## 2018-10-13 RX ORDER — ONDANSETRON 4 MG/1
4 TABLET, ORALLY DISINTEGRATING ORAL EVERY 6 HOURS PRN
Status: DISCONTINUED | OUTPATIENT
Start: 2018-10-13 | End: 2018-10-14 | Stop reason: HOSPADM

## 2018-10-13 RX ORDER — HYDROMORPHONE HYDROCHLORIDE 1 MG/ML
0.5 INJECTION, SOLUTION INTRAMUSCULAR; INTRAVENOUS; SUBCUTANEOUS
Status: DISCONTINUED | OUTPATIENT
Start: 2018-10-13 | End: 2018-10-14 | Stop reason: HOSPADM

## 2018-10-13 RX ORDER — ONDANSETRON 2 MG/ML
4 INJECTION INTRAMUSCULAR; INTRAVENOUS ONCE
Status: COMPLETED | OUTPATIENT
Start: 2018-10-13 | End: 2018-10-13

## 2018-10-13 RX ADMIN — ONDANSETRON 4 MG: 2 INJECTION INTRAMUSCULAR; INTRAVENOUS at 21:34

## 2018-10-13 RX ADMIN — HYDROMORPHONE HYDROCHLORIDE 0.5 MG: 1 INJECTION, SOLUTION INTRAMUSCULAR; INTRAVENOUS; SUBCUTANEOUS at 20:38

## 2018-10-13 RX ADMIN — PROPOFOL 120 MG: 10 INJECTION, EMULSION INTRAVENOUS at 21:39

## 2018-10-13 ASSESSMENT — ENCOUNTER SYMPTOMS
ARTHRALGIAS: 1
JOINT SWELLING: 1
WOUND: 1

## 2018-10-13 NOTE — IP AVS SNAPSHOT
[facility]    [address] [place] [address]    Phone:  [phone]                                       After Visit Summary   10/13/2018    Preethi Mays    MRN: [id number]           After Visit Summary Signature Page     I have received my discharge instructions, and my questions have been answered. I have discussed any challenges I see with this plan with the nurse or doctor.    ..........................................................................................................................................  Patient/Patient Representative Signature      ..........................................................................................................................................  Patient Representative Print Name and Relationship to Patient    ..................................................               ................................................  Date                                   Time    ..........................................................................................................................................  Reviewed by Signature/Title    ...................................................              ..............................................  Date                                               Time          22EPIC Rev 08/18

## 2018-10-14 ENCOUNTER — APPOINTMENT (OUTPATIENT)
Dept: CT IMAGING | Facility: CLINIC | Age: 58
End: 2018-10-14
Attending: ORTHOPAEDIC SURGERY
Payer: COMMERCIAL

## 2018-10-14 ENCOUNTER — APPOINTMENT (OUTPATIENT)
Dept: PHYSICAL THERAPY | Facility: CLINIC | Age: 58
End: 2018-10-14
Attending: INTERNAL MEDICINE
Payer: COMMERCIAL

## 2018-10-14 VITALS
HEART RATE: 100 BPM | WEIGHT: 151.9 LBS | TEMPERATURE: 97.9 F | SYSTOLIC BLOOD PRESSURE: 110 MMHG | OXYGEN SATURATION: 94 % | BODY MASS INDEX: 25.93 KG/M2 | RESPIRATION RATE: 16 BRPM | DIASTOLIC BLOOD PRESSURE: 61 MMHG | HEIGHT: 64 IN

## 2018-10-14 LAB
ANION GAP SERPL CALCULATED.3IONS-SCNC: 7 MMOL/L (ref 3–14)
BUN SERPL-MCNC: 18 MG/DL (ref 7–30)
CALCIUM SERPL-MCNC: 8.1 MG/DL (ref 8.5–10.1)
CHLORIDE SERPL-SCNC: 107 MMOL/L (ref 94–109)
CO2 SERPL-SCNC: 25 MMOL/L (ref 20–32)
CREAT SERPL-MCNC: 0.74 MG/DL (ref 0.52–1.04)
ERYTHROCYTE [DISTWIDTH] IN BLOOD BY AUTOMATED COUNT: 16.2 % (ref 10–15)
GFR SERPL CREATININE-BSD FRML MDRD: 80 ML/MIN/1.7M2
GLUCOSE SERPL-MCNC: 91 MG/DL (ref 70–99)
HCT VFR BLD AUTO: 32.7 % (ref 35–47)
HGB BLD-MCNC: 11.1 G/DL (ref 11.7–15.7)
MCH RBC QN AUTO: 29.5 PG (ref 26.5–33)
MCHC RBC AUTO-ENTMCNC: 33.9 G/DL (ref 31.5–36.5)
MCV RBC AUTO: 87 FL (ref 78–100)
PLATELET # BLD AUTO: 318 10E9/L (ref 150–450)
POTASSIUM SERPL-SCNC: 4.1 MMOL/L (ref 3.4–5.3)
RBC # BLD AUTO: 3.76 10E12/L (ref 3.8–5.2)
SODIUM SERPL-SCNC: 139 MMOL/L (ref 133–144)
WBC # BLD AUTO: 8.6 10E9/L (ref 4–11)

## 2018-10-14 PROCEDURE — 97530 THERAPEUTIC ACTIVITIES: CPT | Mod: GP | Performed by: PHYSICAL THERAPIST

## 2018-10-14 PROCEDURE — G0378 HOSPITAL OBSERVATION PER HR: HCPCS

## 2018-10-14 PROCEDURE — 93005 ELECTROCARDIOGRAM TRACING: CPT

## 2018-10-14 PROCEDURE — 99217 ZZC OBSERVATION CARE DISCHARGE: CPT | Performed by: PHYSICIAN ASSISTANT

## 2018-10-14 PROCEDURE — 80048 BASIC METABOLIC PNL TOTAL CA: CPT | Performed by: INTERNAL MEDICINE

## 2018-10-14 PROCEDURE — 40000193 ZZH STATISTIC PT WARD VISIT: Performed by: PHYSICAL THERAPIST

## 2018-10-14 PROCEDURE — 36415 COLL VENOUS BLD VENIPUNCTURE: CPT | Performed by: INTERNAL MEDICINE

## 2018-10-14 PROCEDURE — 97161 PT EVAL LOW COMPLEX 20 MIN: CPT | Mod: GP | Performed by: PHYSICAL THERAPIST

## 2018-10-14 PROCEDURE — 99207 ZZC CDG-CODE CATEGORY CHANGED: CPT | Performed by: PHYSICIAN ASSISTANT

## 2018-10-14 PROCEDURE — 73200 CT UPPER EXTREMITY W/O DYE: CPT | Mod: LT

## 2018-10-14 PROCEDURE — 97116 GAIT TRAINING THERAPY: CPT | Mod: GP,XU | Performed by: PHYSICAL THERAPIST

## 2018-10-14 PROCEDURE — 25000132 ZZH RX MED GY IP 250 OP 250 PS 637: Performed by: INTERNAL MEDICINE

## 2018-10-14 PROCEDURE — 85027 COMPLETE CBC AUTOMATED: CPT | Performed by: INTERNAL MEDICINE

## 2018-10-14 RX ORDER — RIZATRIPTAN BENZOATE 10 MG/1
10 TABLET ORAL
Status: DISCONTINUED | OUTPATIENT
Start: 2018-10-14 | End: 2018-10-14 | Stop reason: HOSPADM

## 2018-10-14 RX ORDER — AMOXICILLIN 250 MG
1 CAPSULE ORAL 2 TIMES DAILY PRN
Qty: 100 TABLET | Refills: 0 | COMMUNITY
Start: 2018-10-14 | End: 2021-06-28

## 2018-10-14 RX ORDER — HYDROCODONE BITARTRATE AND ACETAMINOPHEN 5; 325 MG/1; MG/1
1-2 TABLET ORAL EVERY 4 HOURS PRN
Qty: 20 TABLET | Refills: 0 | Status: SHIPPED | OUTPATIENT
Start: 2018-10-14 | End: 2021-06-28

## 2018-10-14 RX ADMIN — BUSPIRONE HYDROCHLORIDE 7.5 MG: 7.5 TABLET ORAL at 08:50

## 2018-10-14 RX ADMIN — HYDROCODONE BITARTRATE AND ACETAMINOPHEN 2 TABLET: 5; 325 TABLET ORAL at 13:24

## 2018-10-14 RX ADMIN — DOXYCYCLINE HYCLATE 20 MG: 20 TABLET, FILM COATED ORAL at 08:50

## 2018-10-14 RX ADMIN — DOXYCYCLINE HYCLATE 20 MG: 20 TABLET, FILM COATED ORAL at 00:06

## 2018-10-14 RX ADMIN — HYDROCODONE BITARTRATE AND ACETAMINOPHEN 1 TABLET: 5; 325 TABLET ORAL at 00:07

## 2018-10-14 RX ADMIN — SENNOSIDES AND DOCUSATE SODIUM 2 TABLET: 8.6; 5 TABLET ORAL at 08:50

## 2018-10-14 RX ADMIN — HYDROCODONE BITARTRATE AND ACETAMINOPHEN 1 TABLET: 5; 325 TABLET ORAL at 09:16

## 2018-10-14 RX ADMIN — HYDROCODONE BITARTRATE AND ACETAMINOPHEN 1 TABLET: 5; 325 TABLET ORAL at 08:50

## 2018-10-14 RX ADMIN — HYDROCODONE BITARTRATE AND ACETAMINOPHEN 2 TABLET: 5; 325 TABLET ORAL at 04:25

## 2018-10-14 RX ADMIN — BUSPIRONE HYDROCHLORIDE 7.5 MG: 7.5 TABLET ORAL at 00:06

## 2018-10-14 NOTE — ED PROVIDER NOTES
History     Chief Complaint:  Wrist & Ankle Pain      HPI   Preethi Mays is a 58 year old female who presents to the ED for evaluation of wrist and ankle pain after a fall. The patient reports that she was going to attend the opera in [facility], when she stepped sideways and suddenly tripped over the cobblestones. She twisted her right ankle and stuck out her left arm to break her fall. She denies striking her head or losing consciousness. Immediately after her fall, the patient developed left wrist pain with an associated deformity, as well as left ankle pain and swelling to the lateral malleolus. Here in the ED, the patient also presents with an abrasion to her left knee and slight left knee pain, although she denies any other left leg pain. Of note, the patient is not anticoagulated, and she last ate dinner and had 1.5 cocktails at 1845 tonight. She does report that she is supposed to travel on Thursday and is concerned about being able to fly.    Allergies:  Keflex  Penicillins  Sulfa drugs    Medications:    Buspar  Periostat  Noritate  Maxalt     Past Medical History:    Migraine  Hyperlipidemia  Benign paroxysmal positional vertigo  Rosacea  Blepharitis  Insomnia  TMJ  PTSD  Basal cell carcinoma  Vitamin D deficiency  Bilateral hearing loss  Osteopenia  Depression  Adhesive capsulitis of left shoulder  UTI  Hemorrhoids    Past Surgical History:    Hemorrhoidectomy, internal  [place] teeth extraction  Colonoscopy  Mohs head neck hands feet    Family History:    No past pertinent family history.    Social History:  Negative for tobacco use.  Alcohol use: yes  Marital Status:       Review of Systems   Musculoskeletal: Positive for arthralgias and joint swelling.   Skin: Positive for wound.     10 point review of systems performed and is negative except as above and in HPI.     Physical Exam     Patient Vitals for the past 24 hrs:   BP Temp Temp src Pulse Heart Rate Resp SpO2 Height Weight  "  10/13/18 2215 121/70 - - - 75 17 95 % - -   10/13/18 2210 115/75 - - - 84 - 96 % - -   10/13/18 2205 120/76 - - - 89 - 100 % - -   10/13/18 2200 120/81 - - - 85 - 100 % - -   10/13/18 2155 113/75 - - - 85 - 99 % - -   10/13/18 2150 111/72 - - - 86 - 99 % - -   10/13/18 2146 - - - - - 14 - - -   10/13/18 2145 114/74 - - - 82 - 99 % - -   10/13/18 2144 - - - - - 14 - - -   10/13/18 2142 119/74 - - - 81 - 99 % - -   10/13/18 2140 118/78 - - - 93 - 100 % - -   10/13/18 2139 - - - - - 16 - - -   10/13/18 2137 - - - - 84 14 100 % - -   10/13/18 2130 - - - - 75 - 98 % - -   10/13/18 2129 - - - - - 16 - - -   10/13/18 2124 114/75 - - - 75 - 95 % - -   10/13/18 2115 - - - - - - 95 % - -   10/13/18 2045 - - - - - - 95 % - -   10/13/18 2039 - 98.2  F (36.8  C) Oral 100 - - 96 % - -   10/13/18 2030 - - - - - - 97 % - -   10/13/18 2027 - - - - - - 97 % - -   10/13/18 2012 139/60 - Oral - - 15 - 1.626 m (5' 4\") 62.6 kg (138 lb)        Physical Exam  General: Resting on the gurney, appears very uncomfortable  Head:  The scalp, face, and head appear normal  Mouth/Throat: Mucus membranes are moist  CV:  Regular rate    Normal S1 and S2  No pathological murmur   Brisk capillary refill  Resp:  Breath sounds clear and equal bilaterally    Non-labored, no retractions or accessory muscle use    No coarseness    No wheezing   GI:  Abdomen is soft, no rigidity    No tenderness to palpation  MS:  Normal motor assessment of all extremities.    Good capillary refill noted.    Left wrist deformity     Right ankle with tenderness to palpation on the lateral malleolus  Skin:  No rash or lesions noted.  Neuro:   Speech is normal and fluent. No apparent deficit.    Sensation intact in the hand and fingers. The patient is able to give a thumb's up and ok sign and touch her thumb to pinky. She is able to flex and extend fingers and abduct and adduct fingers.  Brisk capillary refill in the hand.    Psych: Awake. Alert.  Normal affect.  "     Appropriate interactions.     Emergency Department Course   Imaging:  Radiographic findings were communicated with the patient who voiced understanding of the findings.  XR Ankle 3 views, right:   Minimally displaced lateral malleolus fracture, as per radiology.     XR Wrist 3 views, left:   1. Severely comminuted intra-articular distal radius fracture.  2. Ulnar styloid process fracture, as per radiology.     XR Wrist 2 views, left:   Casting of the left wrist. Acute fracture of the distal  radius and ulnar styloid. There is mildly decreased impaction, as per radiology.     Procedures:  [facility] Procedure Note        Sedation:      Performed by: Delaney Nance MD  Authorized by: Delaney Nance MD    Pre-Procedure Assessment done at 2130.    Expected Level:  Deep Sedation    Indication:  Sedation is required to allow for fracture reduction    Consent obtained from patient after discussing the risks, benefits and alternatives.    PO Intake:  Not NPO but emergent condition outweighs risk.    ASA Class:  Class 1 - HEALTHY PATIENT    Mallampati:  Grade 3:  Soft palate visible, posterior pharyngeal wall not visible    Lungs: Lungs Clear with good breath sounds bilaterally.     Heart: Normal heart sounds and rate    History and physical reviewed and no updates needed. I have reviewed the lab findings, diagnostic data, medications, and the plan for sedation. I have determined this patient to be an appropriate candidate for the planned sedation and procedure and have reassessed the patient IMMEDIATELY PRIOR to sedation and procedure.    Sedation Post Procedure Summary:    Prior to the start of the procedure and with procedural staff participation, I verbally confirmed the patient s identity using two indicators, relevant allergies, that the procedure was appropriate and matched the consent or emergent situation, and that the correct equipment/implants were available. Immediately prior to starting the procedure  I conducted the Time Out with the procedural staff and re-confirmed the patient s name, procedure, and site/side. (The Joint Commission universal protocol was followed.)  Yes      Sedatives: Propofol    Vital signs, airway, End Tidal CO2 and pulse oximetry were monitored and remained stable throughout the procedure and sedation was maintained until the procedure was complete.  The patient was monitored by staff until sedation discharge criteria were met.    Patient tolerance: Patient tolerated the procedure well with no immediate complications.    Time of sedation in minutes:  25 minutes from beginning to end of physician one to one monitoring.    Procedure Note: Splint Placement Right Ankle  Physician: Delaney Nance MD  Diagnosis: fibula fracture  Consent: Informed written, see paper chart  Description of Procedure:  The patient's right leg was placed in a short leg splint with orthoglass material.  The ankle was maintained at 90 degrees flexion.  The neurovascular exam was normal before and after splint placement.  The patient tolerated the procedure well, there were no complications.     Procedure Note: Reduction of Fractured Left Wrist  Physician: Delaney Nance MD  Diagnosis: distal radius fracture  Consent: Informed written, see paper chart  Description of Procedure: Consent as above.  Patient positioned in supine position.  Procedural anesthesia was utilized, see above note.  The arm was grasped above and below fracture.  Recreating mechanism of injury the fracture area was reduced successfully, but due to instability allignment could not be fully maintained.   The neurovascular exam was normal before and after reduction attempt.  The patient tolerated the procedure well, there were no complications.      Procedure Note: Splint Placement Left Wrist  Physician: Delaney Nance MD  Diagnosis: fracture  Consent: Informed written, see paper chart  Description of Procedure:  Following reduction of wrist fracture a  sugartong splint was applied plaster to the PIP's.  The elbow was maintained at 90 degrees flexion.  The neurovascular exam was normal before and after splint placement.  The patient tolerated the procedure well, there were no complications. A sling was applied.     Interventions:  2038 Dilaudid 0.5 mg IV  2134 Zofran 4 mg IV  2139 Propofol 60 mg IV  2146 Propofol 60 mg IV  Please see MAR for full list of medications administered in the ED.     Emergency Department Course:  Nursing notes and vitals reviewed. (2022) I performed an exam of the patient as documented above.     IV inserted. Medicine administered as documented above. Blood drawn. This was sent to the lab for further testing, results above.    The patient was sent for ankle and wrist x-rays while in the emergency department, findings above.     (2114) I rechecked the patient and discussed the results of her workup thus far.     (2131) I discussed the risks and benefits of sedation and reduction with the patient, and she consented to these procedures.    (2139) The patient underwent full sedation, see procedure note above.    (2148) The patient's left wrist was reduced and splint was applied, see procedure note above.    (2200) A splint was placed on the patient's right ankle, see procedure note above.    (2205) This was the end of my monitoring time for the patient's sedation.    The patient was sent for a repeat wrist x-ray, findings above.    (2213)  I consulted with Dr. Holman of the hospitalist services. They are in agreement to accept the patient for admission.    Findings and plan explained to the Patient who consents to admission. Discussed the patient with Dr. Holman, who will admit the patient to an observation bed for further monitoring, evaluation, and treatment.    Impression & Plan    Medical Decision Making:  Norma Bell is a 58 year old female who presents for evaluation of right ankle and left wrist pain after a mechanical fall tonight.  CMS is intact distally in the extremity.  Pulses are normal and there are no signs of serious sequelae including compartment syndrome of the leg or foot.      Xrays reveal a closed fracture of the right malleolus and a closed fracture of the left radial wrist that did require urgent reduction. Repeat x-rays of the wrist look slightly improved.    The patient understands that this need for further wrist reduction and/or surgery may change with time and orthopedic consultation.  There is no indication for ortho consultation from the ED.  The patients head to toe trauma exam is otherwise normal at this time and no further trauma workup is needed as I believe there is no signs of serious head, neck, chest, spinal, extremity, pelvic or abdominal injuries. Due to the patient's upper and lower extremity injuries, the patient will be admitted to observation for PT and ambulation assistance.    Diagnosis:    ICD-10-CM    1. Closed fracture of left wrist, initial encounter S62.102A    2. Closed fracture of right ankle, initial encounter S82.891A        Disposition:  Admitted to Dr. River Abdullahi Disclosure:  I, Shakira Zendejas, am serving as a scribe on 10/13/2018 at 9:39 PM to personally document services performed by Delaney Nance MD based on my observations and the provider's statements to me.      Shakira Zendejas  10/13/2018    EMERGENCY DEPARTMENT       Delaney Nance MD  10/13/18 [id number]

## 2018-10-14 NOTE — DISCHARGE SUMMARY
[facility]    Discharge Summary  Hospitalist    Date of Admission:  10/13/2018  Date of Discharge:  10/14/2018  Discharging Provider: Nicol Pruett PA-C    Discharge Diagnoses   Closed fracture left distal radius  Closed fracture right malleolus  Mechanical fall  PTSD  Rosacea    History of Present Illness   Preethi Mays is an 58 year old female with a history of PTSD, rosacea, and migraines who presented to the Emergency Department for evaluation after a mechanical fall. The [place]  when her show caught on the cobblestone and she fell. She twisted her right ankle and landed on outstretched hands. She immediately noted deformity of her wrist and had pain thus she presented to the ED. Please see admission history and physical by Dr. Holman for additional information.     On day of discharge patient is doing ok. Pain is controlled with Norco q4 hours which she is tolerating. She is tolerating po. She denies CP, SOB, nausea or vomiting. No numbness/tingling to extremities. Plan to discharge home and return for surgery tomorrow.     Hospital Course   Preethi Mays is a 58 year old female with a past medical history significant for PTSD, rosacea, and migraines who was admitted on 10/13/2018 after presenting with right malleolar and left wrist fracture after a fall.      Closed fracture of right Malleolus s/p mechanical fall.   Patient caught shoe on cobblestone and twisted ankle while falling. Xray shows closed fracture of right distal fibula. Seen by Orthopedics who recommends weight bearing as tolerated with use of CAM boot for 6 weeks. She will be on aspirin for DVT prophylaxis and will follow up with Orthopedics for xrays in 2 weeks.   --Orthotics consulted, provided with CAM boot  --PT consult to assess mobility, she will discharge home with walker and CAM boot  --[place] q6 hours at discharge  --bowel regimen     Left distal radius  fracture s/p mechanical fall.   Extended left arm to catch herself when she was falling. Xray showed severely comminuted intra-articular distal radius fracture and ulnar styloid process fracture. This was reduced and splinted in the Emergency Department.   --Orthopedics consulted, plan for operative intervention as an outpatient tomorrow 10/15  --CT has been ordered by Ortho for further evaluation, results pending.   --[place] for pain, bowel regimen   --non weight bearing      Pre-op evaluation. Patient has no personal cardiac history. She is able to complete 4 mets of activity. No chest pain or shortness of breath at present or recently. She denies any issues with bleeding or bruising. No previous issues with anesthesia. EKG shows sinus bradycardia without acute ischemic changes. She can proceed to surgery without further cardiac testing.      PTSD. Continued Wellbutrin     Rosacea. Continued doxycycline     This patient was seen and examined with Dr. León who agrees with the above plan.    Nicol Pruett PA-C    Significant Results and Procedures   See below     Code Status   Full Code       Primary Care Physician   JAY ROJAS    Physical Exam   Temp: 97.3  F (36.3  C) Temp src: Oral BP: 115/63 Pulse: 100 Heart Rate: 68 Resp: 16 SpO2: 98 % O2 Device: None (Room air) Oxygen Delivery: 15 LPM  Vitals:    10/13/18 2012 10/13/18 2323   Weight: 62.6 kg (138 lb) 68.9 kg (151 lb 14.4 oz)     Vital Signs with Ranges  Temp:  [97.1  F (36.2  C)-98.2  F (36.8  C)] 97.3  F (36.3  C)  Pulse:  [100] 100  Heart Rate:  [68-93] 68  Resp:  [14-18] 16  BP: (111-139)/(60-81) 115/63  SpO2:  [95 %-100 %] 98 %       Constitutional: Alert and oriented, sitting up in bed. Appears comfortable at this time. Approprietly conversant.   ENT: normal cephalic, moist mucous membranes  Respiratory: Lungs clear to auscultation bilaterally, no increased work of breathing or wheezing  Cardiovascular: Regular rate and rhythm, no murmur,  capillary refill <2 seconds on affected extremities.   GI:  active bowel sounds, abdomen soft, non-tender to palpation   Skin/Integumen: warm, dry  MSK:  Splint in place left wrist, she is able to wiggle fingers which are mildly edematous. Splint in place on RLE, she is able to wiggle her toes.   Neuro:  Cranial nerves 2-12 grossly intact. Sensation intact.       Discharge Disposition   Discharged to home  Condition at discharge: Stable    Consultations This Hospital Stay   SOCIAL WORK IP CONSULT  PHYSICAL THERAPY ADULT IP CONSULT  OCCUPATIONAL THERAPY ADULT IP CONSULT  ORTHOPEDIC SURGERY IP CONSULT  ORTHOSIS EXTREMITY LOWER REFERRAL IP CONSULT    Time Spent on this Encounter   I, Nicol Pruett, personally saw the patient today and spent greater than 30 minutes discharging this patient.    Discharge Orders   No discharge procedures on file.  Discharge Medications   Current Discharge Medication List      START taking these medications    Details   HYDROcodone-acetaminophen (NORCO) 5-325 MG per tablet Take 1-2 tablets by mouth every 4 hours as needed for moderate to severe pain  Qty: 20 tablet, Refills: 0    Associated Diagnoses: Closed fracture of left wrist, initial encounter         CONTINUE these medications which have NOT CHANGED    Details   Ascorbic Acid (VITAMIN C PO) Take 1 capsule by mouth daily      busPIRone (BUSPAR) 7.5 MG tablet Take 1 tablet (7.5 mg) by mouth 2 times daily  Qty: 60 tablet, Refills: 2    Associated Diagnoses: PTSD (post-traumatic stress disorder)      cyclobenzaprine (FLEXERIL) 5 MG tablet Take 5 mg by mouth daily as needed for muscle spasms      doxycycline (PERIOSTAT) 20 MG tablet Take 20 mg by mouth 2 times daily      fish oil-omega-3 fatty acids 1000 MG capsule Take 1 g by mouth daily      metroNIDAZOLE (NORITATE) 1 % cream Apply topically daily      naproxen sodium (ANAPROX) 220 MG tablet Take 440 mg by mouth daily as needed for moderate pain      Rizatriptan Benzoate (MAXALT  PO) Take 10 mg by mouth as needed for migraine      VITAMIN D, CHOLECALCIFEROL, PO Take 1 tablet by mouth daily           Allergies   Allergies   Allergen Reactions     Keflex [Cephalexin] Hives     Penicillins      Sulfa Drugs      Data   Most Recent 3 CBC's:  Recent Labs   Lab Test  10/14/18   0609   WBC  8.6   HGB  11.1*   MCV  87   PLT  318      Most Recent 3 BMP's:  Recent Labs   Lab Test  10/14/18   0609   NA  139   POTASSIUM  4.1   CHLORIDE  107   CO2  25   BUN  18   CR  0.74   ANIONGAP  7   RACHEL  8.1*   GLC  91     Most Recent 2 LFT's:No lab results found.  Most Recent INR's and Anticoagulation Dosing History:  Anticoagulation Dose History     Recent Dosing and Labs Latest Ref Rng & Units 1/27/2010    INR 0.86 - 1.14 1.01        Most Recent 3 Troponin's:No lab results found.  Most Recent Cholesterol Panel:No lab results found.  Most Recent 6 Bacteria Isolates From Any Culture (See EPIC Reports for Culture Details):No lab results found.  Most Recent TSH, T4 and A1c Labs:No lab results found.  Results for orders placed or performed during the hospital encounter of 10/13/18   Ankle XR, G/E 3 views, right    Narrative    RIGHT ANKLE THREE OR MORE VIEWS   10/13/2018 9:03 PM     HISTORY: Pain, swelling, fall.    COMPARISON: None.    FINDINGS: A horizontally oriented minimally displaced fracture is  present involving the tip of the lateral malleolus. There is severe  soft tissue swelling. No other fractures are identified. Syndesmotic  and mortise spaces are maintained.      Impression    IMPRESSION: Minimally displaced lateral malleolus fracture.     SID RUIZ MD   XR Wrist Left G/E 3 Views    Narrative    LEFT WRIST THREE OR MORE VIEWS  10/13/2018 9:04 PM     HISTORY: Pain, swelling, fall.    COMPARISON: None.    FINDINGS: Fractures are present involving the distal radius and ulnar  styloid process. The distal radius fracture is severely comminuted and  demonstrates intra-articular extension. There is  widening of the  fracture fragment with mild distraction of the anterior and posterior  distal radius. Severe soft tissue swelling is present.      Impression    IMPRESSION:   1. Severely comminuted intra-articular distal radius fracture.  2. Ulnar styloid process fracture.      SID RUIZ MD   XR Wrist Left 2 Views    Narrative    LEFT WRIST TWO VIEWS      10/13/2018 10:25 PM     HISTORY: Post reduction.    COMPARISON: 10/13/2018.      Impression    IMPRESSION: Casting of the left wrist. Acute fracture of the distal  radius and ulnar styloid. There is mildly decreased impaction.       YULIANA LUO MD

## 2018-10-14 NOTE — PLAN OF CARE
Problem: Patient Care Overview  Goal: Plan of Care/Patient Progress Review  OT: Orders rec'd. Patient to work with PT this PM on mobility and if she is ok to mobilize, she will discharge home and return for surgery. Holding OT eval until patient is seen by PT.

## 2018-10-14 NOTE — PLAN OF CARE
Problem: Patient Care Overview  Goal: Plan of Care/Patient Progress Review  Outcome: No Change  AVSS; left arm to hand, and right leg to foot splinted; CMS intact with capillary refill in left fingers and right toes < 3 seconds, (unable to assess pulses); splints c/d/i; Norco for pain with relief; pt up with 2 assist to the bedside commode; ortho, PT/OT, social work consults ordered; continue to monitor.

## 2018-10-14 NOTE — PROGRESS NOTES
MD Notification    Notified Person: MD Meyer    Notified Person Name:    Notification Date/Time: 10/14 at 1.10 PM    Notification Interaction:  Telephone conversation    Purpose of Notification: CT scan order clearification    Orders Received: CT with out contrast    Comments:

## 2018-10-14 NOTE — PROGRESS NOTES
10/14/18 1500   Quick Adds   Type of Visit Initial PT Evaluation   Living Environment   Lives With spouse   Living Arrangements house   Home Accessibility stairs to enter home   Number of Stairs to Enter Home 3  (3 platform steps no rail; 3 steps with rail in garage)   Number of Stairs Within Home 1   Transportation Available car;family or friend will provide   Living Environment Comment Pt has w/c and ramps if needed   Self-Care   Dominant Hand right   Usual Activity Tolerance excellent   Current Activity Tolerance moderate   Regular Exercise yes   Activity/Exercise Type walking  (elliptical)   Exercise Amount/Frequency 5-7 times/wk   Equipment Currently Used at Home none   Activity/Exercise/Self-Care Comment Has FWW, SEC, w/c, ramps, commode, crutches   Functional Level Prior   Ambulation 0-->independent   Transferring 0-->independent   Toileting 0-->independent   Bathing 0-->independent   Dressing 0-->independent   Eating 0-->independent   Communication 0-->understands/communicates without difficulty   Swallowing 0-->swallows foods/liquids without difficulty   Cognition 0 - no cognition issues reported   Fall history within last six months yes   Number of times patient has fallen within last six months 1   Which of the above functional risks had a recent onset or change? ambulation;transferring;toileting;dressing   General Information   Onset of Illness/Injury or Date of Surgery - Date 10/13/18   Referring Physician Sagrario Holman MD   Patient/Family Goals Statement Home   Pertinent History of Current Problem (include personal factors and/or comorbidities that impact the POC) Pt admitted under observation status for evaluation of L wrist and R ankle pain after fall; found to have L distal radius fracture, reduced in ED, NWB and plan for OR 10/15 as OP; also found to have R distal fibula fracture, WBAT in CAM boot x6 weeks.    Precautions/Limitations fall precautions   Weight-Bearing Status - GUY  nonweight-bearing   Weight-Bearing Status - RUE full weight-bearing   Weight-Bearing Status - LLE full weight-bearing   Weight-Bearing Status - RLE weight-bearing as tolerated   General Observations Pt resting in bed with RLE in CAM boot elevated, LUE splinted elevated; spouse at bedside   Cognitive Status Examination   Orientation orientation to person, place and time   Level of Consciousness alert   Follows Commands and Answers Questions 100% of the time;able to follow multistep instructions   Pain Assessment   Patient Currently in Pain Yes, see Vital Sign flowsheet   Integumentary/Edema   Integumentary/Edema Comments LUE splinted, RLE in CAM boot; abrasion to L knee; no other deficits identified   Posture    Posture Not impaired   Range of Motion (ROM)   ROM Comment R ankle not tested; R knee/hip and LLE WNL; L wrist not tested, L elbow/shoulder and RUE WNL   Strength   Strength Comments WFL for mobility with AD   Bed Mobility   Bed Mobility Comments Layla for RLE supine>sit   Transfer Skills   Transfer Comments Layla sit>stand to SEC on R   Gait   Gait Comments Layla gait  4 steps with SEC on R   Balance   Balance Comments static stance with CGA   Sensory Examination   Sensory Perception Comments Denies N/T   General Therapy Interventions   Planned Therapy Interventions bed mobility training;gait training;transfer training;home program guidelines;progressive activity/exercise   Clinical Impression   Criteria for Skilled Therapeutic Intervention yes, treatment indicated   PT Diagnosis Impaired gait   Influenced by the following impairments NWB LUE, pain, decreased balance   Functional limitations due to impairments Decreased safety and IND with functional transfers, gait, stairs   Clinical Presentation Stable/Uncomplicated   Clinical Presentation Rationale clinically improving   Clinical Decision Making (Complexity) Low complexity   Predicted Duration of Therapy Intervention (days/wks) eval and single treatment  "  Anticipated Equipment Needs at Discharge platform walker   Anticipated Discharge Disposition Home with Assist   Risk & Benefits of therapy have been explained Yes   Patient, Family & other staff in agreement with plan of care Yes   [facility] TM \"6 Clicks\"   2016, Trustees of [facility], under license to [employer].  All rights reserved.   6 Clicks Short Forms Basic Mobility Inpatient Short Form   [facility] AM-PAC  \"6 Clicks\" V.2 Basic Mobility Inpatient Short Form   1. Turning from your back to your side while in a flat bed without using bedrails? 3 - A Little   2. Moving from lying on your back to sitting on the side of a flat bed without using bedrails? 3 - A Little   3. Moving to and from a bed to a chair (including a wheelchair)? 3 - A Little   4. Standing up from a chair using your arms (e.g., wheelchair, or bedside chair)? 3 - A Little   5. To walk in hospital room? 3 - A Little   6. Climbing 3-5 steps with a railing? 3 - A Little   Basic Mobility Raw Score (Score out of 24.Lower scores equate to lower levels of function) 18   Total Evaluation Time   Total Evaluation Time (Minutes) 10     "

## 2018-10-14 NOTE — PROGRESS NOTES
RECEIVING UNIT ED HANDOFF REVIEW    ED Nurse Handoff Report was reviewed by: Aimee Mathews on October 13, 2018 at 11:15 PM

## 2018-10-14 NOTE — PLAN OF CARE
Problem: Patient Care Overview  Goal: Plan of Care/Patient Progress Review  Outcome: Improving  A&O. VSS,RA. C/O pain at fx right ankle and left wrist. PRN Norco administered x1. Assist x2 pivoting to bed side commode. Ortho consulted. NWB on left hand. Cam boot on right foot. Cam boot to arrived. PT consult pending. discharge pending on Pt recommendation.

## 2018-10-14 NOTE — PLAN OF CARE
Problem: Patient Care Overview  Goal: Plan of Care/Patient Progress Review  PT: Orders received, chart reviewed, discussed with ortho and PA-C. Pt is to be NWB on L wrist, OK to use platform walker; to be WBAT in CAM boot R ankle. Plan to disch home today if pt mobilizing adequately and return tomorrow for L wrist surgery. If pt is not mobilizing well enough, may need to remain hospitalized tonight. Plan to assess mobility after CAM boot delivered. Per discussion with charge RN, orthotics to bring CAM boot around 3PM this afternoon. Will continue to follow.    Discharge Planner PT   Patient plan for discharge: Home with dtr/spouse assist, plan for L wrist surgery tomorrow as OP  Current status: PT eval completed, single treatment completed. Pt admitted under observation status for evaluation of L wrist and R ankle pain after fall; found to have L distal radius fracture, reduced in ED, NWB and plan for OR 10/15 as OP; also found to have R distal fibula fracture, WBAT in CAM boot x6 weeks. Pt lives with her spouse in a house with 3 steps and B rails to enter, 1 step within between kitchen/living; typically in upstairs bathroom but plans to stay on main level where all needs met. Pt typically IND without AD, very active with daily exercise. Pt has w/c, ramps, FWW, SEC, crutches, and commode at home if needed.    Educated on WB status for both LUE and RLE. Pt demonstrates IND with bed mobility. After session and education, pt able to perform sit<>stand transfers to L platform walker with SBA. Attempted ambulation 10' with SEC on R but pt needing Layla and reporting increased R ankle pain; pt ambulates 15'x2 with L platform walker and SBA and reports much improved comfort, able to ascend/descend 4 stairs with rail and CGA. Spouse present for education.  Barriers to return to prior living situation: None anticipated  Recommendations for discharge: Home with supervision for mobility and assist on stairs/with ADLs, use of L  platform walker (DME entered)  Rationale for recommendations: Pt/spouse demonstrate adequate mobility/supervision for anticipated safe disch to home with use of L platform walker for mobility. OT screen completed, pt will have assist for ADLs, has necessary equipment, no IP OT needs identified. No further IP PT needs identified. PT orders completed.       Entered by: Vickie Bermudez 10/14/2018 3:38 PM     Physical Therapy Discharge Summary    Reason for therapy discharge:    All goals and outcomes met, no further needs identified.    Progress towards therapy goal(s). See goals on Care Plan in [facility] electronic health record for goal details.  Goals met    Therapy recommendation(s):    No further therapy is recommended.

## 2018-10-14 NOTE — CONSULTS
[facility]    Orthopedics Consultation    Date of Admission:  10/13/2018    Assessment & Plan   Preethi Mays is a 58 year old female who was admitted on 10/13/2018. I was asked to see the patient for left distal radius fracture and right distal fibula fracture.      Closed fracture of distal fibula of right ankle    Plan: Weightbearing as tolerated in the cam boot times 6 weeks.  Aspirin enteric-coated 325 mg daily for 6 weeks DVT prophylaxis starting Tuesday following wrist surgery.  F/u 2 weeks for x-rays  Left Distal Radius Fracture    Plan: Discussed both operative and nonoperative management.  Risks of ORIF left distal radius discussed included but not limited to bleeding, infection, damage to surrounding neurovascular structures, stiffness, malunion, delayed union, nonunion, need for revision surgery, post-traumatic arthritis, blood clots, pulmonary embolus, stroke, anesthetic complications and even death.  No guarantees given or implied. Patient thoughtfully acknowledges risks and wishes to proceed.  Plan for [facility] OR Monday afternoon    Osteoporosis:  Vit D and Ca++ level.  F/u PCP for work up and treatment      Hesham Whelan MD    Code Status    Full Code    Reason for Consult   Reason for consult: I was asked by Dr. Nance to evaluate this patient for left distal radius fracture and right distal fibula fracture.    Primary Care Physician   JAY ROJAS    History of Present Illness   Preethi Mays is a 58 year old female with left distal radius fracture and right distal fibula fracture.  The patient reports that she was going to attend the opera in [facility], when she stepped sideways and suddenly tripped over the cobblestones. She twisted her right ankle and stuck out her left arm to break her fall. She denies striking her head or losing consciousness. She does report that she is supposed to fly on Thursday to see her daughter in [place] and is  wondering if she will be able to make the trip.    MEDS:   Current Outpatient Prescriptions   Medication Sig Dispense Refill     HYDROcodone-acetaminophen (NORCO) 5-325 MG per tablet Take 1-2 tablets by mouth every 4 hours as needed for moderate to severe pain 20 tablet 0       PAST MEDICAL HISTORY: No past medical history on file.    PAST SURGICAL HISTORY: No past surgical history on file.    FAMILY HISTORY: No family history on file.    SOCIAL HISTORY:   Social History   Substance Use Topics     Smoking status: Never Smoker     Smokeless tobacco: Never Used     Alcohol use Not on file       ALLERGIES:    Allergies   Allergen Reactions     Keflex [Cephalexin] Hives     Penicillins      Sulfa Drugs      Allergies:  Keflex  Penicillins  Sulfa drugs     Medications:    Buspar  Periostat  Noritate  Maxalt      Past Medical History:    Migraine  Hyperlipidemia  Benign paroxysmal positional vertigo  Rosacea  Blepharitis  Insomnia  TMJ  PTSD  Basal cell carcinoma  Vitamin D deficiency  Bilateral hearing loss  Osteopenia  Depression  Adhesive capsulitis of left shoulder  UTI  Hemorrhoids     Past Surgical History:    Hemorrhoidectomy, internal  [place] teeth extraction  Colonoscopy  Mohs head neck hands feet    ROS:  10 point ROS neg other than the symptoms noted above in the HPI.      Physical Exam   Temp: 97.3  F (36.3  C) Temp src: Oral BP: 115/63 Pulse: 100 Heart Rate: 68 Resp: 16 SpO2: 98 % O2 Device: None (Room air) Oxygen Delivery: 15 LPM  Vital Signs with Ranges  Temp:  [97.1  F (36.2  C)-98.2  F (36.8  C)] 97.3  F (36.3  C)  Pulse:  [100] 100  Heart Rate:  [68-93] 68  Resp:  [14-18] 16  BP: (111-139)/(60-81) 115/63  SpO2:  [95 %-100 %] 98 %  151 lbs 14.4 oz    Constitutional: Pleasant, alert, appropriate, following commands.  HEENT: Head atraumatic normocephalic. Pupils equal round and reactive to light.  Respiratory: Unlabored breathing no audible wheeze  Cardiovascular: Regular rate and rhythm  GI: Abdomen soft  nontender nondistended.  Lymph/Hematologic: no lymphadenopathy  Skin: No rashes, no cyanosis, no edema.  Musculoskeletal: LUE splinted, no pain proximally, wiggles fingers and thumb, SILT m/r/u nerve, <2 sec cap refill  RLE - splinted, wiggles toes, SILT, <2sec cap refill  Neurologic: normal without focal findings, mental status, speech normal, alert and oriented x iii, AUGIE        Data   Results for orders placed or performed during the hospital encounter of 10/13/18 (from the past 24 hour(s))   Ankle XR, G/E 3 views, right    Narrative    RIGHT ANKLE THREE OR MORE VIEWS   10/13/2018 9:03 PM     HISTORY: Pain, swelling, fall.    COMPARISON: None.    FINDINGS: A horizontally oriented minimally displaced fracture is  present involving the tip of the lateral malleolus. There is severe  soft tissue swelling. No other fractures are identified. Syndesmotic  and mortise spaces are maintained.      Impression    IMPRESSION: Minimally displaced lateral malleolus fracture.     SID RUIZ MD   XR Wrist Left G/E 3 Views    Narrative    LEFT WRIST THREE OR MORE VIEWS  10/13/2018 9:04 PM     HISTORY: Pain, swelling, fall.    COMPARISON: None.    FINDINGS: Fractures are present involving the distal radius and ulnar  styloid process. The distal radius fracture is severely comminuted and  demonstrates intra-articular extension. There is widening of the  fracture fragment with mild distraction of the anterior and posterior  distal radius. Severe soft tissue swelling is present.      Impression    IMPRESSION:   1. Severely comminuted intra-articular distal radius fracture.  2. Ulnar styloid process fracture.      SID RUIZ MD   XR Wrist Left 2 Views    Narrative    LEFT WRIST TWO VIEWS      10/13/2018 10:25 PM     HISTORY: Post reduction.    COMPARISON: 10/13/2018.      Impression    IMPRESSION: Casting of the left wrist. Acute fracture of the distal  radius and ulnar styloid. There is mildly decreased impaction.        YULIANA LUO MD   Basic metabolic panel   Result Value Ref Range    Sodium 139 133 - 144 mmol/L    Potassium 4.1 3.4 - 5.3 mmol/L    Chloride 107 94 - 109 mmol/L    Carbon Dioxide 25 20 - 32 mmol/L    Anion Gap 7 3 - 14 mmol/L    Glucose 91 70 - 99 mg/dL    Urea Nitrogen 18 7 - 30 mg/dL    Creatinine 0.74 0.52 - 1.04 mg/dL    GFR Estimate 80 >60 mL/min/1.7m2    GFR Estimate If Black >90 >60 mL/min/1.7m2    Calcium 8.1 (L) 8.5 - 10.1 mg/dL   CBC with platelets   Result Value Ref Range    WBC 8.6 4.0 - 11.0 10e9/L    RBC Count 3.76 (L) 3.8 - 5.2 10e12/L    Hemoglobin 11.1 (L) 11.7 - 15.7 g/dL    Hematocrit 32.7 (L) 35.0 - 47.0 %    MCV 87 78 - 100 fl    MCH 29.5 26.5 - 33.0 pg    MCHC 33.9 31.5 - 36.5 g/dL    RDW 16.2 (H) 10.0 - 15.0 %    Platelet Count 318 150 - 450 10e9/L

## 2018-10-14 NOTE — PLAN OF CARE
"Problem: Patient Care Overview  Goal: Plan of Care/Patient Progress Review  Outcome: Improving  PRIMARY DIAGNOSIS: \"GENERIC\" NURSING  OUTPATIENT/OBSERVATION GOALS TO BE MET BEFORE DISCHARGE:  1. ADLs back to baseline: No    2. Activity and level of assistance: Up with maximum assistance. Consider SW and/or PT evaluation.     3. Pain status: Improved-controlled with oral pain medications.    4. Return to near baseline physical activity: No     Discharge Planner Nurse   Safe discharge environment identified: Yes  Barriers to discharge: No       Entered by: Natalya Arevalo 10/14/2018 3:58 PM     Please review provider order for any additional goals.   Nurse to notify provider when observation goals have been met and patient is ready for discharge.    A/O, VSS, Ax1/2 with walker and WBAT right leg with CAM boot in place. Minimal discomfort with right leg after PT. 1+ edema left hand fingers with splint on left arm, CAM boot in place, CMS intact. Plan to discharge this evening. Awaiting orders.      "

## 2018-10-14 NOTE — PHARMACY-ADMISSION MEDICATION HISTORY
Admission medication history interview status for the 10/13/2018  admission is complete. See EPIC admission navigator for prior to admission medications     Medication history source reliability:Good    Actions taken by pharmacist (provider contacted, etc):  PTA list verified with Care Everywhere     Additional medication history information not noted on PTA med list :None    Medication reconciliation/reorder completed by provider prior to medication history?   -Patient reported would like to take her buspar and doxycycline dose tonight 10/13/18.     Time spent in this activity:  20 minutes    Prior to Admission medications    Medication Sig Last Dose Taking? Auth Provider   Ascorbic Acid (VITAMIN C PO) Take 1 capsule by mouth daily 10/12/2018 at PM Yes Unknown, Entered By History   busPIRone (BUSPAR) 7.5 MG tablet Take 1 tablet (7.5 mg) by mouth 2 times daily 10/13/2018 at AM Yes Cherrie Davis, APRN CNP   cyclobenzaprine (FLEXERIL) 5 MG tablet Take 5 mg by mouth daily as needed for muscle spasms Past Week at PRN Yes Unknown, Entered By History   doxycycline (PERIOSTAT) 20 MG tablet Take 20 mg by mouth 2 times daily 10/13/2018 at AM Yes Reported, Patient   fish oil-omega-3 fatty acids 1000 MG capsule Take 1 g by mouth daily 10/13/2018 at AM Yes Unknown, Entered By History   metroNIDAZOLE (NORITATE) 1 % cream Apply topically daily 10/12/2018 at PM Yes Reported, Patient   naproxen sodium (ANAPROX) 220 MG tablet Take 440 mg by mouth daily as needed for moderate pain 10/13/2018 at PRN Yes Unknown, Entered By History   Rizatriptan Benzoate (MAXALT PO) Take 10 mg by mouth as needed for migraine Past Week at PRN Yes Reported, Patient   VITAMIN D, CHOLECALCIFEROL, PO Take 1 tablet by mouth daily 10/12/2018 at PM Yes Unknown, Entered By History     Sonia Deleon, Pharm.D IV Student

## 2018-10-14 NOTE — PLAN OF CARE
Problem: Patient Care Overview  Goal: Plan of Care/Patient Progress Review  Outcome: No Change  Observation Goals:    -vital signs normal or at patient baseline - met  -returns to baseline functional status - not met  -safe disposition plan has been identified - not met

## 2018-10-14 NOTE — PLAN OF CARE
Problem: Patient Care Overview  Goal: Plan of Care/Patient Progress Review  Outcome: Improving  Patient has been discharged October 14, 2018 5:02 PM. Discharge instructions and education reviewed. Medication schedule and followup appointments discussed. Safe narcotic use discussed and patient voiced understanding. Patient had no questions. All belongings sent home with patient and spouse.

## 2018-10-14 NOTE — H&P
[facility]    History and Physical  Hospitalist       Date of Admission:  10/13/2018    Assessment & Plan   Preethi Mays is a 58 year old female with a history of rosacea, migraines, depression presents with a fall and fracture of right malleolus and left wrist.    Active Problems:    Closed fracture of malleolus of right ankle    Fracture of wrist, left, closed, initial encounter    Secondary to traumatic fall, admit to observation    PT/OT/social work to see patient    Consult Ortho.    IV fluids and  pain medications ordered.    Possible nonsurgical management for malleolus fracture, her left radius is severely comminuted ,which might need surgical fixation.    Rosacea    Continue doxycycline    Migraine without aura    On as needed Maxalt    PTSD    On Wellbutrin, continue that     DVT Prophylaxis: Pneumatic Compression Devices  Code Status: Full Code    Disposition: Expected discharge in 24 hours    Sagrario Holman MD    Primary Care Physician   JAY ROJAS    Chief Complaint   Fall one episode    History is obtained from the patient    History of Present Illness   Preethi Mays is a 58 year old female with a history of migraine, rosacea, and depression with PTSD presents to the emergency department following a fall.  Patient and her  was going to a Opera while she tripped and fell.  Following the fall she noticed pain on her left wrist and on her right ankle.  X-rays were done in the emergency room which indicated fractures on her right malleolus and left wrist.  She had splints placed in the ER.  Due to the location of her fractures she might need help with the mobility and the plan is to admit for further evaluation and disposition.  Past Medical History    I have reviewed this patient's medical history and updated it with pertinent information if needed.   No past medical history on file.    Past Surgical History   I have reviewed this patient's surgical history  and updated it with pertinent information if needed.  No past surgical history on file.    Prior to Admission Medications   Prior to Admission Medications   Prescriptions Last Dose Informant Patient Reported? Taking?   Rizatriptan Benzoate (MAXALT PO)   Yes No   Sig: Take 10 mg by mouth as needed for migraine   busPIRone (BUSPAR) 7.5 MG tablet   No No   Sig: Take 1 tablet (7.5 mg) by mouth 2 times daily   doxycycline (PERIOSTAT) 20 MG tablet   Yes No   Sig: Take 20 mg by mouth 2 times daily   metroNIDAZOLE (NORITATE) 1 % cream   Yes No   Sig: Apply topically daily      Facility-Administered Medications: None     Allergies   Allergies   Allergen Reactions     Keflex [Cephalexin] Hives     Penicillins      Sulfa Drugs        Social History   I have reviewed this patient's social history and updated it with pertinent information if needed. Preethi Mays  reports that she has never smoked. She has never used smokeless tobacco.    Family History   I have reviewed this patient's family history and updated it with pertinent information if needed.   No family history on file.    Review of Systems   The 10 point Review of Systems is negative other than noted in the HPI or here.     Physical Exam   Temp: 98.2  F (36.8  C) Temp src: Oral BP: 139/60 Pulse: 100   Resp: 14 SpO2: 95 % O2 Device: Oxymask Oxygen Delivery: 15 LPM  Vital Signs with Ranges  Temp:  [98.2  F (36.8  C)] 98.2  F (36.8  C)  Pulse:  [100] 100  Resp:  [14-16] 14  BP: (139)/(60) 139/60  SpO2:  [95 %-97 %] 95 %  138 lbs 0 oz    Constitutional: Awake, alert, cooperative, no apparent distress.  Eyes: Conjunctiva and pupils examined and normal.  HEENT: Moist mucous membranes, normal dentition.  Respiratory: Clear to auscultation bilaterally, no crackles or wheezing.  Cardiovascular: Regular rate and rhythm, normal S1 and S2, and no murmur noted.  GI: Soft, non-distended, non-tender, normal bowel sounds.  Lymph/Hematologic: No anterior cervical or  supraclavicular adenopathy.  Skin: No rashes, no cyanosis, no edema.  Musculoskeletal: Left upper extremity and right lower extremity in cast  Neurologic: Cranial nerves 2-12 intact, normal strength and sensation.  Psychiatric: Alert, oriented to person, place and time, no obvious anxiety or depression.    Data   Data reviewed today:  I personally reviewed No lab results found in last 7 days.    Imaging:  Recent Results (from the past 24 hour(s))   Ankle XR, G/E 3 views, right    Narrative    RIGHT ANKLE THREE OR MORE VIEWS   10/13/2018 9:03 PM     HISTORY: Pain, swelling, fall.    COMPARISON: None.    FINDINGS: A horizontally oriented minimally displaced fracture is  present involving the tip of the lateral malleolus. There is severe  soft tissue swelling. No other fractures are identified. Syndesmotic  and mortise spaces are maintained.      Impression    IMPRESSION: Minimally displaced lateral malleolus fracture.     SID RUIZ MD   XR Wrist Left G/E 3 Views    Narrative    LEFT WRIST THREE OR MORE VIEWS  10/13/2018 9:04 PM     HISTORY: Pain, swelling, fall.    COMPARISON: None.    FINDINGS: Fractures are present involving the distal radius and ulnar  styloid process. The distal radius fracture is severely comminuted and  demonstrates intra-articular extension. There is widening of the  fracture fragment with mild distraction of the anterior and posterior  distal radius. Severe soft tissue swelling is present.      Impression    IMPRESSION:   1. Severely comminuted intra-articular distal radius fracture.  2. Ulnar styloid process fracture.      SID RUIZ MD

## 2018-10-14 NOTE — ED NOTES
[facility]  ED Nurse Handoff Report    ED Chief complaint: Wrist Pain (pt. reports left wrist pain s/p fall, deformity noted.  pain to the right ankle as well.)      ED Diagnosis:   Final diagnoses:   Closed fracture of left wrist, initial encounter   Closed fracture of right ankle, initial encounter       Code Status: Full Code    Allergies:   Allergies   Allergen Reactions     Keflex [Cephalexin] Hives     Penicillins      Sulfa Drugs        Activity level - Baseline/Home:  Independent    Activity Level - Current:   Independent     Needed?: No    Isolation: No  Infection: Not Applicable  Bariatric?: No    Vital Signs:   Vitals:    10/13/18 2200 10/13/18 2205 10/13/18 2210 10/13/18 2215   BP: 120/81 120/76 115/75 121/70   Pulse:       Resp:    17   Temp:       TempSrc:       SpO2: 100% 100% 96% 95%   Weight:       Height:           Cardiac Rhythm: ,        Pain level: 0-10 Pain Scale: 6    Is this patient confused?: No   [place] - Suicide Severity Rating Scale Completed?  Yes  If yes, what color did the patient score?  White    Patient Report: Initial Complaint: patient here due to a fall today.she c/o left wrist pain and swelling,right ankle pain and abrasion to left knee  Focused Assessment:fall, left wrist pain and right ankle painTests Performed:xrayAbnormal Results:see  Xray resultsTreatments provided: dilaudid for pain,zofran and proprol for sedation  Family Comments:  at bedside    OBS brochure/video discussed/provided to patient/family: Yes              Name of person given brochure if not patient:               Relationship to patient: .  ED Medications:   Medications   HYDROmorphone (PF) (DILAUDID) injection 0.5 mg (0.5 mg Intravenous Given 10/13/18 2038)   ondansetron (ZOFRAN) injection 4 mg (4 mg Intravenous Given 10/13/18 2134)   propofol (DIPRIVAN) injection 10 mg/mL vial (120 mg Intravenous Given 10/13/18 2139)       Drips infusing?:  No    For the majority  of the shift this patient was [place].   Interventions performed were left wrist reduction,and right ankle splint.    Severe Sepsis OR Septic Shock Diagnosis Present: No    To be done/followed up on inpatient unit:  .    ED NURSE PHONE NUMBER: [phone]

## 2018-10-14 NOTE — PROVIDER NOTIFICATION
MD Notification    Notified Person: MD    Notified Person Name: Tip LIANG    Notification Date/Time: 10/14/2018/ 09:21 AM    Notification Interaction: phone    Purpose of Notification: To provide plan for patient.    Orders Received: Awaiting callback.    Comments:    10/14/2018: 11:20 AM- Ortho MD in room with patient. Recommended CAM boot.               11:40 AM- Othotics paged and will see pt after [facility] approx at 3 PM.                 11:50 AM- PT paged and pt on scheduled for therapy after 3 PM.     Pt updated.

## 2018-10-14 NOTE — ED NOTES
Bed: ST01  Expected date:   Expected time:   Means of arrival:   Comments:  Room 1 Wrist reduction

## 2018-10-14 NOTE — PROGRESS NOTES
[facility]    Hospitalist Progress Note      Assessment & Plan   Preethi Mays is a 58 year old female with a past medical history significant for PTSD, rosacea, and migraines who was admitted on 10/13/2018 after presenting with right malleolar and left wrist fracture after a fall.     Closed fracture of right Malleolus s/p mechanical fall.   Patient caught shoe on cobblestone and twisted ankle while falling. Xray shows closed fracture of right distal fibula. Seen by Orthopedics who recommends weight bearing as tolerated with use of CAM boot for 6 weeks. She will be on aspirin for DVT prophylaxis and will follow up with Orthopedics for xrays in 2 weeks.   --Orthotics consulted, plan for CAM fitting later today  --PT consult to assess mobility  --Norco q6 hours    Left distal radius fracture s/p mechanical fall.   Extended left arm to catch herself when she was falling. Xray showed severely comminuted intra-articular distal radius fracture and ulnar styloid process fracture. This was reduced and splinted in the Emergency Department.   --Orthopedics consulted, plan for operative intervention as an outpatient tomorrow 10/15  --CT has been ordered by Ortho for further evaluation   --[place] for pain, bowel regimen   --non weight bearing   --PT consult this afternoon    Pre-op evaluation. Patient has no personal cardiac history. She is able to complete 4 mets of activity. No chest pain or shortness of breath at present or recently. She denies any issues with bleeding or bruising. No previous issues with anesthesia. EKG shows sinus bradycardia. She can proceed to surgery without further cardiac testing.     PTSD. Continue Wellbutrin    Rosacea. Continue doxycycline     DVT Prophylaxis: Low Risk/Ambulatory with no VTE prophylaxis indicated. Will be on asa post op per Ortho    Code Status: Full Code    Disposition: Expected discharge this evening pending PT eval     This patient was seen and examined  with Dr. León who agrees with the above plan.    Nicol Pruett PA-C    Interval History   Doing ok today. Pain is controlled with oral Norco which she is tolerating thus far. No nausea or vomiting. No chest pain or shortness of breath. No numbness or tingling.     -Data reviewed today: I reviewed all new labs and imaging results over the last 24 hours. I personally reviewed no images or EKG's today.    Physical Exam   Temp: 97.3  F (36.3  C) Temp src: Oral BP: 115/63 Pulse: 100 Heart Rate: 68 Resp: 16 SpO2: 98 % O2 Device: None (Room air) Oxygen Delivery: 15 LPM  Vitals:    10/13/18 2012 10/13/18 2323   Weight: 62.6 kg (138 lb) 68.9 kg (151 lb 14.4 oz)     Vital Signs with Ranges  Temp:  [97.1  F (36.2  C)-98.2  F (36.8  C)] 97.3  F (36.3  C)  Pulse:  [100] 100  Heart Rate:  [68-93] 68  Resp:  [14-18] 16  BP: (111-139)/(60-81) 115/63  SpO2:  [95 %-100 %] 98 %       Constitutional: Alert and oriented, sitting up in bed. Appears comfortable at this time. Approprietly conversant.   ENT: normal cephalic, moist mucous membranes  Respiratory: Lungs clear to auscultation bilaterally, no increased work of breathing or wheezing  Cardiovascular: Regular rate and rhythm, no murmur, capillary refill <2 seconds on affected extremities.   GI:  active bowel sounds, abdomen soft, non-tender to palpation   Skin/Integumen: warm, dry  MSK:  Splint in place left wrist, she is able to wiggle fingers which are mildly edematous. Splint in place on RLE, she is able to wiggle her toes.   Neuro:  Cranial nerves 2-12 grossly intact. Sensation intact.     Medications       busPIRone  7.5 mg Oral BID     doxycycline  20 mg Oral BID       Data     Recent Labs  Lab 10/14/18  0609   WBC 8.6   HGB 11.1*   MCV 87         POTASSIUM 4.1   CHLORIDE 107   CO2 25   BUN 18   CR 0.74   ANIONGAP 7   RACHEL 8.1*   GLC 91       Recent Results (from the past 24 hour(s))   Ankle XR, G/E 3 views, right    Narrative    RIGHT ANKLE THREE OR MORE  VIEWS   10/13/2018 9:03 PM     HISTORY: Pain, swelling, fall.    COMPARISON: None.    FINDINGS: A horizontally oriented minimally displaced fracture is  present involving the tip of the lateral malleolus. There is severe  soft tissue swelling. No other fractures are identified. Syndesmotic  and mortise spaces are maintained.      Impression    IMPRESSION: Minimally displaced lateral malleolus fracture.     SID RUIZ MD   XR Wrist Left G/E 3 Views    Narrative    LEFT WRIST THREE OR MORE VIEWS  10/13/2018 9:04 PM     HISTORY: Pain, swelling, fall.    COMPARISON: None.    FINDINGS: Fractures are present involving the distal radius and ulnar  styloid process. The distal radius fracture is severely comminuted and  demonstrates intra-articular extension. There is widening of the  fracture fragment with mild distraction of the anterior and posterior  distal radius. Severe soft tissue swelling is present.      Impression    IMPRESSION:   1. Severely comminuted intra-articular distal radius fracture.  2. Ulnar styloid process fracture.      SID RUIZ MD   XR Wrist Left 2 Views    Narrative    LEFT WRIST TWO VIEWS      10/13/2018 10:25 PM     HISTORY: Post reduction.    COMPARISON: 10/13/2018.      Impression    IMPRESSION: Casting of the left wrist. Acute fracture of the distal  radius and ulnar styloid. There is mildly decreased impaction.       YULIANA LUO MD

## 2018-10-15 ENCOUNTER — ANESTHESIA EVENT (OUTPATIENT)
Dept: SURGERY | Facility: CLINIC | Age: 58
End: 2018-10-15
Payer: COMMERCIAL

## 2018-10-15 ENCOUNTER — ANESTHESIA (OUTPATIENT)
Dept: SURGERY | Facility: CLINIC | Age: 58
End: 2018-10-15
Payer: COMMERCIAL

## 2018-10-15 ENCOUNTER — APPOINTMENT (OUTPATIENT)
Dept: GENERAL RADIOLOGY | Facility: CLINIC | Age: 58
End: 2018-10-15
Attending: ORTHOPAEDIC SURGERY
Payer: COMMERCIAL

## 2018-10-15 ENCOUNTER — HOSPITAL ENCOUNTER (OUTPATIENT)
Facility: CLINIC | Age: 58
Discharge: HOME | End: 2018-10-15
Attending: ORTHOPAEDIC SURGERY | Admitting: ORTHOPAEDIC SURGERY
Payer: COMMERCIAL

## 2018-10-15 VITALS
SYSTOLIC BLOOD PRESSURE: 116 MMHG | TEMPERATURE: 96.6 F | RESPIRATION RATE: 10 BRPM | DIASTOLIC BLOOD PRESSURE: 74 MMHG | OXYGEN SATURATION: 94 % | HEIGHT: 64 IN | BODY MASS INDEX: 25.27 KG/M2 | WEIGHT: 148 LBS

## 2018-10-15 DIAGNOSIS — S62.102A FRACTURE OF WRIST, LEFT, CLOSED, INITIAL ENCOUNTER: Primary | ICD-10-CM

## 2018-10-15 PROCEDURE — 37000009 ZZH ANESTHESIA TECHNICAL FEE, EACH ADDTL 15 MIN: Performed by: ORTHOPAEDIC SURGERY

## 2018-10-15 PROCEDURE — 25000125 ZZHC RX 250: Performed by: PHYSICIAN ASSISTANT

## 2018-10-15 PROCEDURE — 25000128 H RX IP 250 OP 636: Performed by: ANESTHESIOLOGY

## 2018-10-15 PROCEDURE — 27211024 ZZHC OR SUPPLY OTHER OPNP: Performed by: ORTHOPAEDIC SURGERY

## 2018-10-15 PROCEDURE — 25000128 H RX IP 250 OP 636: Performed by: NURSE ANESTHETIST, CERTIFIED REGISTERED

## 2018-10-15 PROCEDURE — 36000060 ZZH SURGERY LEVEL 3 W FLUORO 1ST 30 MIN: Performed by: ORTHOPAEDIC SURGERY

## 2018-10-15 PROCEDURE — 25000125 ZZHC RX 250: Performed by: ORTHOPAEDIC SURGERY

## 2018-10-15 PROCEDURE — 27210794 ZZH OR GENERAL SUPPLY STERILE: Performed by: ORTHOPAEDIC SURGERY

## 2018-10-15 PROCEDURE — 71000012 ZZH RECOVERY PHASE 1 LEVEL 1 FIRST HR: Performed by: ORTHOPAEDIC SURGERY

## 2018-10-15 PROCEDURE — 25000125 ZZHC RX 250: Performed by: NURSE ANESTHETIST, CERTIFIED REGISTERED

## 2018-10-15 PROCEDURE — 40000277 XR SURGERY CARM FLUORO LESS THAN 5 MIN W STILLS

## 2018-10-15 PROCEDURE — 37000008 ZZH ANESTHESIA TECHNICAL FEE, 1ST 30 MIN: Performed by: ORTHOPAEDIC SURGERY

## 2018-10-15 PROCEDURE — C1713 ANCHOR/SCREW BN/BN,TIS/BN: HCPCS | Performed by: ORTHOPAEDIC SURGERY

## 2018-10-15 PROCEDURE — 71000027 ZZH RECOVERY PHASE 2 EACH 15 MINS: Performed by: ORTHOPAEDIC SURGERY

## 2018-10-15 PROCEDURE — 40000170 ZZH STATISTIC PRE-PROCEDURE ASSESSMENT II: Performed by: ORTHOPAEDIC SURGERY

## 2018-10-15 PROCEDURE — 36000058 ZZH SURGERY LEVEL 3 EA 15 ADDTL MIN: Performed by: ORTHOPAEDIC SURGERY

## 2018-10-15 DEVICE — IMP SCR SYN CORTEX T8 STARDRIVE 2.7X12MM SELF TAP 202.872: Type: IMPLANTABLE DEVICE | Site: WRIST | Status: FUNCTIONAL

## 2018-10-15 DEVICE — IMP SCR SYN CAN T8 STARDRIVE 2.4X20MM VA-LCP ST 02.210.120: Type: IMPLANTABLE DEVICE | Site: WRIST | Status: FUNCTIONAL

## 2018-10-15 DEVICE — IMP SCR SYN CAN T8 STARDRIVE 2.4X16MM VA-LCP ST 02.210.116: Type: IMPLANTABLE DEVICE | Site: WRIST | Status: FUNCTIONAL

## 2018-10-15 DEVICE — IMP SCR SYN CORTEX T8 STARDRIVE 2.7X14MM SELF TAP 202.874: Type: IMPLANTABLE DEVICE | Site: WRIST | Status: FUNCTIONAL

## 2018-10-15 DEVICE — IMPLANTABLE DEVICE: Type: IMPLANTABLE DEVICE | Site: WRIST | Status: FUNCTIONAL

## 2018-10-15 RX ORDER — BUPIVACAINE HYDROCHLORIDE AND EPINEPHRINE 5; 5 MG/ML; UG/ML
INJECTION, SOLUTION EPIDURAL; INTRACAUDAL; PERINEURAL PRN
Status: DISCONTINUED | OUTPATIENT
Start: 2018-10-15 | End: 2018-10-15 | Stop reason: HOSPADM

## 2018-10-15 RX ORDER — CLINDAMYCIN PHOSPHATE 900 MG/50ML
900 INJECTION, SOLUTION INTRAVENOUS
Status: COMPLETED | OUTPATIENT
Start: 2018-10-15 | End: 2018-10-15

## 2018-10-15 RX ORDER — FENTANYL CITRATE 50 UG/ML
100 INJECTION, SOLUTION INTRAMUSCULAR; INTRAVENOUS ONCE
Status: COMPLETED | OUTPATIENT
Start: 2018-10-15 | End: 2018-10-15

## 2018-10-15 RX ORDER — HYDROMORPHONE HYDROCHLORIDE 1 MG/ML
.3-.5 INJECTION, SOLUTION INTRAMUSCULAR; INTRAVENOUS; SUBCUTANEOUS EVERY 10 MIN PRN
Status: DISCONTINUED | OUTPATIENT
Start: 2018-10-15 | End: 2018-10-15 | Stop reason: HOSPADM

## 2018-10-15 RX ORDER — FENTANYL CITRATE 50 UG/ML
INJECTION, SOLUTION INTRAMUSCULAR; INTRAVENOUS PRN
Status: DISCONTINUED | OUTPATIENT
Start: 2018-10-15 | End: 2018-10-15

## 2018-10-15 RX ORDER — NALOXONE HYDROCHLORIDE 0.4 MG/ML
.1-.4 INJECTION, SOLUTION INTRAMUSCULAR; INTRAVENOUS; SUBCUTANEOUS
Status: DISCONTINUED | OUTPATIENT
Start: 2018-10-15 | End: 2018-10-15 | Stop reason: HOSPADM

## 2018-10-15 RX ORDER — SODIUM CHLORIDE, SODIUM LACTATE, POTASSIUM CHLORIDE, CALCIUM CHLORIDE 600; 310; 30; 20 MG/100ML; MG/100ML; MG/100ML; MG/100ML
INJECTION, SOLUTION INTRAVENOUS CONTINUOUS
Status: DISCONTINUED | OUTPATIENT
Start: 2018-10-15 | End: 2018-10-15 | Stop reason: HOSPADM

## 2018-10-15 RX ORDER — SODIUM CHLORIDE, SODIUM LACTATE, POTASSIUM CHLORIDE, CALCIUM CHLORIDE 600; 310; 30; 20 MG/100ML; MG/100ML; MG/100ML; MG/100ML
INJECTION, SOLUTION INTRAVENOUS CONTINUOUS PRN
Status: DISCONTINUED | OUTPATIENT
Start: 2018-10-15 | End: 2018-10-15

## 2018-10-15 RX ORDER — ONDANSETRON 2 MG/ML
4 INJECTION INTRAMUSCULAR; INTRAVENOUS EVERY 30 MIN PRN
Status: DISCONTINUED | OUTPATIENT
Start: 2018-10-15 | End: 2018-10-15 | Stop reason: HOSPADM

## 2018-10-15 RX ORDER — HYDROCODONE BITARTRATE AND ACETAMINOPHEN 5; 325 MG/1; MG/1
1-2 TABLET ORAL EVERY 4 HOURS PRN
Qty: 20 TABLET | Refills: 0 | Status: SHIPPED | OUTPATIENT
Start: 2018-10-15 | End: 2021-06-28

## 2018-10-15 RX ORDER — ASPIRIN 325 MG
325 TABLET ORAL DAILY
Qty: 42 TABLET | Refills: 0 | Status: SHIPPED | OUTPATIENT
Start: 2018-10-15 | End: 2021-06-28

## 2018-10-15 RX ORDER — FAMOTIDINE 20 MG
2 TABLET ORAL DAILY
Qty: 100 CAPSULE | Refills: 3 | Status: SHIPPED | OUTPATIENT
Start: 2018-10-15

## 2018-10-15 RX ORDER — ONDANSETRON 4 MG/1
4 TABLET, ORALLY DISINTEGRATING ORAL EVERY 30 MIN PRN
Status: DISCONTINUED | OUTPATIENT
Start: 2018-10-15 | End: 2018-10-15 | Stop reason: HOSPADM

## 2018-10-15 RX ORDER — DEXAMETHASONE SODIUM PHOSPHATE 4 MG/ML
INJECTION, SOLUTION INTRA-ARTICULAR; INTRALESIONAL; INTRAMUSCULAR; INTRAVENOUS; SOFT TISSUE PRN
Status: DISCONTINUED | OUTPATIENT
Start: 2018-10-15 | End: 2018-10-15

## 2018-10-15 RX ORDER — MEPERIDINE HYDROCHLORIDE 25 MG/ML
12.5 INJECTION INTRAMUSCULAR; INTRAVENOUS; SUBCUTANEOUS
Status: DISCONTINUED | OUTPATIENT
Start: 2018-10-15 | End: 2018-10-15 | Stop reason: HOSPADM

## 2018-10-15 RX ORDER — TRAMADOL HYDROCHLORIDE 50 MG/1
50 TABLET ORAL EVERY 6 HOURS PRN
Qty: 20 TABLET | Refills: 0 | Status: SHIPPED | OUTPATIENT
Start: 2018-10-15 | End: 2021-06-28

## 2018-10-15 RX ORDER — ACETAMINOPHEN 325 MG/1
650 TABLET ORAL
Status: CANCELLED | OUTPATIENT
Start: 2018-10-15

## 2018-10-15 RX ORDER — FENTANYL CITRATE 50 UG/ML
25-50 INJECTION, SOLUTION INTRAMUSCULAR; INTRAVENOUS
Status: DISCONTINUED | OUTPATIENT
Start: 2018-10-15 | End: 2018-10-15 | Stop reason: HOSPADM

## 2018-10-15 RX ORDER — ROPIVACAINE HYDROCHLORIDE 5 MG/ML
INJECTION, SOLUTION EPIDURAL; INFILTRATION; PERINEURAL PRN
Status: DISCONTINUED | OUTPATIENT
Start: 2018-10-15 | End: 2018-10-15

## 2018-10-15 RX ORDER — MAGNESIUM HYDROXIDE 1200 MG/15ML
LIQUID ORAL PRN
Status: DISCONTINUED | OUTPATIENT
Start: 2018-10-15 | End: 2018-10-15 | Stop reason: HOSPADM

## 2018-10-15 RX ORDER — ONDANSETRON 2 MG/ML
INJECTION INTRAMUSCULAR; INTRAVENOUS PRN
Status: DISCONTINUED | OUTPATIENT
Start: 2018-10-15 | End: 2018-10-15

## 2018-10-15 RX ORDER — FENTANYL CITRATE 50 UG/ML
25-50 INJECTION, SOLUTION INTRAMUSCULAR; INTRAVENOUS
Status: CANCELLED | OUTPATIENT
Start: 2018-10-15

## 2018-10-15 RX ORDER — ALBUTEROL SULFATE 0.83 MG/ML
2.5 SOLUTION RESPIRATORY (INHALATION) EVERY 4 HOURS PRN
Status: DISCONTINUED | OUTPATIENT
Start: 2018-10-15 | End: 2018-10-15 | Stop reason: HOSPADM

## 2018-10-15 RX ORDER — CLINDAMYCIN PHOSPHATE 900 MG/50ML
900 INJECTION, SOLUTION INTRAVENOUS SEE ADMIN INSTRUCTIONS
Status: DISCONTINUED | OUTPATIENT
Start: 2018-10-15 | End: 2018-10-15 | Stop reason: HOSPADM

## 2018-10-15 RX ORDER — HYDROCODONE BITARTRATE AND ACETAMINOPHEN 5; 325 MG/1; MG/1
1 TABLET ORAL
Status: CANCELLED | OUTPATIENT
Start: 2018-10-15

## 2018-10-15 RX ORDER — ONDANSETRON 4 MG/1
4 TABLET, ORALLY DISINTEGRATING ORAL
Status: CANCELLED | OUTPATIENT
Start: 2018-10-15

## 2018-10-15 RX ORDER — ACETAMINOPHEN 650 MG/1
650 SUPPOSITORY RECTAL EVERY 4 HOURS PRN
Status: DISCONTINUED | OUTPATIENT
Start: 2018-10-15 | End: 2018-10-15 | Stop reason: HOSPADM

## 2018-10-15 RX ORDER — SENNOSIDES 8.6 MG
2 TABLET ORAL 2 TIMES DAILY
Qty: 120 TABLET | Refills: 1 | Status: SHIPPED | OUTPATIENT
Start: 2018-10-15 | End: 2021-06-28

## 2018-10-15 RX ORDER — PROPOFOL 10 MG/ML
INJECTION, EMULSION INTRAVENOUS CONTINUOUS PRN
Status: DISCONTINUED | OUTPATIENT
Start: 2018-10-15 | End: 2018-10-15

## 2018-10-15 RX ADMIN — FENTANYL CITRATE 25 MCG: 50 INJECTION, SOLUTION INTRAMUSCULAR; INTRAVENOUS at 16:42

## 2018-10-15 RX ADMIN — PHENYLEPHRINE HYDROCHLORIDE 100 MCG: 10 INJECTION, SOLUTION INTRAMUSCULAR; INTRAVENOUS; SUBCUTANEOUS at 17:10

## 2018-10-15 RX ADMIN — MIDAZOLAM 1 MG: 1 INJECTION INTRAMUSCULAR; INTRAVENOUS at 16:38

## 2018-10-15 RX ADMIN — ROPIVACAINE HYDROCHLORIDE 25 ML: 5 INJECTION, SOLUTION EPIDURAL; INFILTRATION; PERINEURAL at 15:01

## 2018-10-15 RX ADMIN — PHENYLEPHRINE HYDROCHLORIDE 100 MCG: 10 INJECTION, SOLUTION INTRAMUSCULAR; INTRAVENOUS; SUBCUTANEOUS at 17:23

## 2018-10-15 RX ADMIN — ONDANSETRON 4 MG: 2 INJECTION INTRAMUSCULAR; INTRAVENOUS at 16:55

## 2018-10-15 RX ADMIN — PHENYLEPHRINE HYDROCHLORIDE 100 MCG: 10 INJECTION, SOLUTION INTRAMUSCULAR; INTRAVENOUS; SUBCUTANEOUS at 17:55

## 2018-10-15 RX ADMIN — CLINDAMYCIN PHOSPHATE 900 MG: 18 INJECTION, SOLUTION INTRAVENOUS at 16:49

## 2018-10-15 RX ADMIN — SODIUM CHLORIDE, POTASSIUM CHLORIDE, SODIUM LACTATE AND CALCIUM CHLORIDE: 600; 310; 30; 20 INJECTION, SOLUTION INTRAVENOUS at 17:07

## 2018-10-15 RX ADMIN — FENTANYL CITRATE 75 MCG: 50 INJECTION, SOLUTION INTRAMUSCULAR; INTRAVENOUS at 14:35

## 2018-10-15 RX ADMIN — MIDAZOLAM HYDROCHLORIDE 2 MG: 1 INJECTION, SOLUTION INTRAMUSCULAR; INTRAVENOUS at 14:35

## 2018-10-15 RX ADMIN — FENTANYL CITRATE 25 MCG: 50 INJECTION, SOLUTION INTRAMUSCULAR; INTRAVENOUS at 14:54

## 2018-10-15 RX ADMIN — PHENYLEPHRINE HYDROCHLORIDE 100 MCG: 10 INJECTION, SOLUTION INTRAMUSCULAR; INTRAVENOUS; SUBCUTANEOUS at 17:31

## 2018-10-15 RX ADMIN — PHENYLEPHRINE HYDROCHLORIDE 100 MCG: 10 INJECTION, SOLUTION INTRAMUSCULAR; INTRAVENOUS; SUBCUTANEOUS at 17:35

## 2018-10-15 RX ADMIN — PROPOFOL 100 MCG/KG/MIN: 10 INJECTION, EMULSION INTRAVENOUS at 16:45

## 2018-10-15 RX ADMIN — PHENYLEPHRINE HYDROCHLORIDE 100 MCG: 10 INJECTION, SOLUTION INTRAMUSCULAR; INTRAVENOUS; SUBCUTANEOUS at 17:29

## 2018-10-15 RX ADMIN — SODIUM CHLORIDE, POTASSIUM CHLORIDE, SODIUM LACTATE AND CALCIUM CHLORIDE: 600; 310; 30; 20 INJECTION, SOLUTION INTRAVENOUS at 16:41

## 2018-10-15 RX ADMIN — MIDAZOLAM 1 MG: 1 INJECTION INTRAMUSCULAR; INTRAVENOUS at 16:42

## 2018-10-15 RX ADMIN — PHENYLEPHRINE HYDROCHLORIDE 100 MCG: 10 INJECTION, SOLUTION INTRAMUSCULAR; INTRAVENOUS; SUBCUTANEOUS at 17:45

## 2018-10-15 RX ADMIN — PHENYLEPHRINE HYDROCHLORIDE 100 MCG: 10 INJECTION, SOLUTION INTRAMUSCULAR; INTRAVENOUS; SUBCUTANEOUS at 16:56

## 2018-10-15 RX ADMIN — PHENYLEPHRINE HYDROCHLORIDE 100 MCG: 10 INJECTION, SOLUTION INTRAMUSCULAR; INTRAVENOUS; SUBCUTANEOUS at 17:04

## 2018-10-15 RX ADMIN — DEXAMETHASONE SODIUM PHOSPHATE 4 MG: 4 INJECTION, SOLUTION INTRA-ARTICULAR; INTRALESIONAL; INTRAMUSCULAR; INTRAVENOUS; SOFT TISSUE at 16:55

## 2018-10-15 RX ADMIN — PROPOFOL 100 MCG/KG/MIN: 10 INJECTION, EMULSION INTRAVENOUS at 16:44

## 2018-10-15 RX ADMIN — PHENYLEPHRINE HYDROCHLORIDE 100 MCG: 10 INJECTION, SOLUTION INTRAMUSCULAR; INTRAVENOUS; SUBCUTANEOUS at 17:16

## 2018-10-15 RX ADMIN — PHENYLEPHRINE HYDROCHLORIDE 100 MCG: 10 INJECTION, SOLUTION INTRAMUSCULAR; INTRAVENOUS; SUBCUTANEOUS at 17:50

## 2018-10-15 RX ADMIN — PHENYLEPHRINE HYDROCHLORIDE 100 MCG: 10 INJECTION, SOLUTION INTRAMUSCULAR; INTRAVENOUS; SUBCUTANEOUS at 17:40

## 2018-10-15 ASSESSMENT — ENCOUNTER SYMPTOMS: ORTHOPNEA: 0

## 2018-10-15 NOTE — ANESTHESIA POSTPROCEDURE EVALUATION
Patient: Preethi Mays    Procedure(s):  OPEN REDUCTION INTERNAL FIXATION LEFT DISTAL RADIUS - Wound Class: I-Clean    Diagnosis:Left Wrist Fracture  Diagnosis Additional Information: No value filed.    Anesthesia Type:  Peripheral Nerve Block    Note:  Anesthesia Post Evaluation    Patient location during evaluation: PACU  Patient participation: Able to fully participate in evaluation  Level of consciousness: awake  Pain management: adequate  Airway patency: patent  Cardiovascular status: acceptable  Respiratory status: acceptable  Hydration status: acceptable  PONV: none             Last vitals:  Vitals:    10/15/18 1520 10/15/18 1810 10/15/18 1820   BP: 113/64 108/65 109/77   Resp: 14 8 9   Temp:      SpO2: 100% 95% 98%         Electronically Signed By: River Villafuerte MD  October 15, 2018  6:32 PM

## 2018-10-15 NOTE — ANESTHESIA PROCEDURE NOTES
Peripheral nerve/Neuraxial procedure note : Brachial plexus (Ax approach)  Pre-Procedure  Performed by JULIET REZA  Location: pre-op      Pre-Anesthestic Checklist: patient identified, site marked, risks and benefits discussed, informed consent, monitors and equipment checked, pre-op evaluation, at physician/surgeon's request and post-op pain management    Timeout  Correct Patient: Yes   Correct Procedure: Yes   Correct Site: Yes   Correct Laterality: Yes     Site Marked: Yes   .   Procedure Documentation    .    Procedure:  left  Brachial plexus (Ax approach).  Local skin infiltrated with 5 mL of 1% lidocaine.     Ultrasound used to identify targeted nerve, plexus, or vascular marker and placed a needle adjacent to it., Ultrasound was used to visualize the spread of the anesthetic in close proximity to the above stated nerve. A permanent image is entered into the patient's record.  Patient Prep;mask, sterile gloves, chlorhexidine gluconate and isopropyl alcohol.  .  Needle: short bevel Needle Gauge: 21.  Needle Length (millimeters) 50  Insertion Method: Single Shot.       Assessment/Narrative  Paresthesias: No.  .  The placement was negative for: blood aspirated, painful injection and site bleeding.  Bolus given via needle..   Secured via.   Complications: none. Comments:  Real time US used to identify nerve, needle and observe injection of local anesthetic around nerve.  Low pressure.  No complications.  US image documented.

## 2018-10-15 NOTE — PROGRESS NOTES
S.  Patient was seen today at [facility]  for an evaluation for a cam walker for their right foot/ankle as ordered. Patient was in a splint upon arrival. I spoke with the nursing staff and they approved the splint removal and fitting of boot.   O/G. help stabilize foot and ankle.  A.  Patient was fit with a small tall pneumatic Cam Walker for the right leg. Patient was between sizes. The small appeared to be the best option at the time of the fitting.   Cam walker was assembled by removing the soft liner from the foot plate and uprights, the patients foot and leg was positioned into the soft liner with the heel firmly sealed.  The liner was closed tightly and secured with the Velcro closure. The heel and foot were positioned in the rocker bottom foot plate and the uprights were contoured to fall at mid-line of the lower leg and secured to the Velcro.  The foot plate Velcro straps were secured, proximal straps first, then the distal strap.  The lower leg straps were attached starting distal and working proximal. The ankle joints were set at 90 degrees of dorsi/plantar flexion. Donning and doffing of the Cam Walker was explained.  P.   Patient was advised to contact this office with any problems or questions.

## 2018-10-15 NOTE — IP AVS SNAPSHOT
MRN:[id number]                      After Visit Summary   10/15/2018    Preethi Mays    MRN: [id number]           Thank you!     Thank you for choosing [facility] for your care. Our goal is always to provide you with excellent care. Hearing back from our patients is one way we can continue to improve our services. Please take a few minutes to complete the written survey that you may receive in the mail after you visit with us. Thank you!        Patient Information     Date Of Birth          1960        About your hospital stay     You were admitted on:  October 15, 2018 You last received care in the:  [facility] PACU    You were discharged on:  October 15, 2018       Who to Call     For medical emergencies, please call 911.  For non-urgent questions about your medical care, please call your primary care provider or clinic, [phone]  For questions related to your surgery, please call your surgery clinic        Attending Provider     Provider Hesham Recio MD Orthopaedic Surgery       Primary Care Provider Office Phone # Fax #    Octaviano Hathaway [phone]      After Care Instructions      Diet as Tolerated       Return to diet before surgery, unless instructed otherwise.            Discharge Instructions       Review outpatient procedure discharge instructions with patient as directed by Provider            Ice to affected area       Ice pack to surgical site every 15 minutes per hour for 24 hours            No Dressing Change       No dressing change until follow up appointment.            No weight bearing           Return to clinic       Please follow up with Dr. Whelan with [facility] in 2-3 weeks. Call his care coordinator Natalya at [phone] to make an appointment.            Shower        Cover splint to LUE and keep dry and intact if showering.            Wound care       Do not immerse splint in water. Keep  dressing dry until follow up appointment.                  Your next 10 appointments already scheduled     Nov 16, 2018  9:00 AM CST   Adult Med Follow UP with RICHARD Cantu CNP   Psychiatry Clinic ([facility])    [place] [id number]  [address] [id number] [phone]              Further instructions from your care team       Same Day Surgery Discharge Instructions for  Sedation and General Anesthesia       It's not unusual to feel dizzy, light-headed or faint for up to 24 hours after surgery or while taking pain medication.  If you have these symptoms: sit for a few minutes before standing and have someone assist you when you get up to walk or use the bathroom.      You should rest and relax for the next 24 hours. We recommend you make arrangements to have an adult stay with you for at least 24 hours after your discharge.  Avoid hazardous and strenuous activity.      DO NOT DRIVE any vehicle or operate mechanical equipment for 24 hours following the end of your surgery.  Even though you may feel normal, your reactions may be affected by the medication you have received.      Do not drink alcoholic beverages for 24 hours following surgery.       Slowly progress to your regular diet as you feel able. It's not unusual to feel nauseated and/or vomit after receiving anesthesia.  If you develop these symptoms, drink clear liquids (apple juice, ginger ale, broth, 7-up, etc. ) until you feel better.  If your nausea and vomiting persists for 24 hours, please notify your surgeon.        All narcotic pain medications, along with inactivity and anesthesia, can cause constipation. Drinking plenty of liquids and increasing fiber intake will help.      For any questions of a medical nature, call your surgeon.      Do not make important decisions for 24 hours.      If you had general anesthesia, you may have a sore throat for a couple of days related to the  "breathing tube used during surgery.  You may use Cepacol lozenges to help with this discomfort.  If it worsens or if you develop a fever, contact your surgeon.       If you feel your pain is not well managed with the pain medications prescribed by your surgeon, please contact your surgeon's office to let them know so they can address your concerns.     Reasons to contact your surgeon:    1. Signs of possible infection: Check your incision daily for redness, swelling, warmth, red streaks or foul drainage.   2. Elevated temperature.  3. Pain not controlled with pain medication and/or rest.   4. Uncontrolled nausea or vomiting.  5. Any questions or concerns.                NWB to left upper extremity x6 weeks.   Keep splint clean, dry, intact.   Place bag over splint while showering.   Take Norco and/or Tramadol as needed for pain.   Take Senna for constipation.   Ice and elevate left extremity.   Take 2,000 international units of Vitamin D daily for bone health.   Take Aspirin 325 mg qday PO x6 weeks     Please follow up with Dr. Whelan with [facility] in 2-3 weeks for recheck. Call his care coordinator Natalya at [phone] to make an appointment.        Pending Results     Date and Time Order Name Status Description    10/15/2018 1815 XR Surgery NIRAV L/T 5 Min Fluoro w Stills In process     10/14/2018 1306 EKG 12-lead, tracing only Preliminary             Admission Information     Date & Time Provider Department Dept. Phone    10/15/2018 Hesham Whelan MD [facility] [place] [facility] PACU [phone]      Your Vitals Were     Blood Pressure Temperature Respirations Height Weight Pulse Oximetry    118/51 96.6  F (35.9  C) (Oral) 10 1.626 m (5' 4\") 67.1 kg (148 lb) 95%    BMI (Body Mass Index)                   25.4 kg/m2           [employer] Information     [employer] gives you secure access to your electronic health record. If you see a primary care provider, you can also send messages to your care team and " make appointments. If you have questions, please call your primary care clinic.  If you do not have a primary care provider, please call [phone] and they will assist you.        Care EveryWhere ID     This is your Care EveryWhere ID. This could be used by other organizations to access your [facility] medical records  [id number]        Equal Access to Services     JYOTI CURRAN : Hadii alem dye hadasho Soomaali, waaxda luqadaha, qaybta kaalmada leidaabbyda, delores krishnarosauramarcelo santiago . So [facility] [phone].    ATENCIÓN: Si habla español, tiene a armas disposición servicios gratuitos de asistencia lingüística. Catherine al [phone].    We comply with applicable federal civil rights laws and [place] laws. We do not discriminate on the basis of race, color, national origin, age, disability, sex, sexual orientation, or gender identity.               Review of your medicines      START taking        Dose / Directions    aspirin 325 MG tablet   Used for:  Fracture of wrist, left, closed, initial encounter        Dose:  325 mg   Take 1 tablet (325 mg) by mouth daily   Quantity:  42 tablet   Refills:  0       sennosides 8.6 MG tablet   Commonly known as:  SENOKOT   Used for:  Fracture of wrist, left, closed, initial encounter        Dose:  2 tablet   Take 2 tablets by mouth 2 times daily   Quantity:  120 tablet   Refills:  1       traMADol 50 MG tablet   Commonly known as:  ULTRAM   Used for:  Fracture of wrist, left, closed, initial encounter        Dose:  50 mg   Take 1 tablet (50 mg) by mouth every 6 hours as needed for moderate pain or severe pain   Quantity:  20 tablet   Refills:  0         CONTINUE these medicines which may have CHANGED, or have new prescriptions. If we are uncertain of the size of tablets/capsules you have at home, strength may be listed as something that might have changed.        Dose / Directions    * HYDROcodone-acetaminophen 5-325 MG per tablet   Commonly known as:  NORCO   This may  have changed:  Another medication with the same name was added. Make sure you understand how and when to take each.   Used for:  Closed fracture of left wrist, initial encounter        Dose:  1-2 tablet   Take 1-2 tablets by mouth every 4 hours as needed for moderate to severe pain   Quantity:  20 tablet   Refills:  0       * HYDROcodone-acetaminophen 5-325 MG per tablet   Commonly known as:  NORCO   This may have changed:  You were already taking a medication with the same name, and this prescription was added. Make sure you understand how and when to take each.   Used for:  Fracture of wrist, left, closed, initial encounter        Dose:  1-2 tablet   Take 1-2 tablets by mouth every 4 hours as needed for moderate to severe pain   Quantity:  20 tablet   Refills:  0       Vitamin D (Cholecalciferol) 1000 units Caps   This may have changed:    - medication strength  - how much to take   Used for:  Fracture of wrist, left, closed, initial encounter        Dose:  2 tablet   Take 2 tablets by mouth daily   Quantity:  100 capsule   Refills:  3       * Notice:  This list has 2 medication(s) that are the same as other medications prescribed for you. Read the directions carefully, and ask your doctor or other care provider to review them with you.      CONTINUE these medicines which have NOT CHANGED        Dose / Directions    busPIRone 7.5 MG tablet   Commonly known as:  BUSPAR   Used for:  PTSD (post-traumatic stress disorder)        Dose:  7.5 mg   Take 1 tablet (7.5 mg) by mouth 2 times daily   Quantity:  60 tablet   Refills:  2       cyclobenzaprine 5 MG tablet   Commonly known as:  FLEXERIL        Dose:  5 mg   Take 5 mg by mouth daily as needed for muscle spasms   Refills:  0       doxycycline 20 MG tablet   Commonly known as:  PERIOSTAT        Dose:  20 mg   Take 20 mg by mouth 2 times daily   Refills:  0       fish oil-omega-3 fatty acids 1000 MG capsule        Dose:  1 g   Take 1 g by mouth daily   Refills:  0        MAXALT PO        Dose:  10 mg   Take 10 mg by mouth as needed for migraine   Refills:  0       metroNIDAZOLE 1 % cream   Commonly known as:  NORITATE        Apply topically daily   Refills:  0       order for DME   Used for:  Closed fracture of left wrist, initial encounter, Closed fracture of right ankle, initial encounter        Equipment being ordered: Walker Wheels (), Walker () and Walker Platform () Treatment Diagnosis: Impaired gait   Quantity:  1 each   Refills:  0       senna-docusate 8.6-50 MG per tablet   Commonly known as:  SENOKOT-S;PERICOLACE   Used for:  Constipation, unspecified constipation type        Dose:  1 tablet   Take 1 tablet by mouth 2 times daily as needed for constipation   Quantity:  100 tablet   Refills:  0            Where to get your medicines      Some of these will need a paper prescription and others can be bought over the counter. Ask your nurse if you have questions.     Bring a paper prescription for each of these medications     aspirin 325 MG tablet    HYDROcodone-acetaminophen 5-325 MG per tablet    sennosides 8.6 MG tablet    traMADol 50 MG tablet    Vitamin D (Cholecalciferol) 1000 units Caps                Protect others around you: Learn how to safely use, store and throw away your medicines at www.disposemymeds.org.        Information about OPIOIDS     PRESCRIPTION OPIOIDS: WHAT YOU NEED TO KNOW   We gave you an opioid (narcotic) pain medicine. It is important to manage your pain, but opioids are not always the best choice. You should first try all the other options your care team gave you. Take this medicine for as short a time (and as few doses) as possible.    Some activities can increase your pain, such as bandage changes or therapy sessions. It may help to take your pain medicine 30 to 60 minutes before these activities. Reduce your stress by getting enough sleep, working on hobbies you enjoy and practicing relaxation or meditation. Talk to your  care team about ways to manage your pain beyond prescription opioids.    These medicines have risks:    DO NOT drive when on new or higher doses of pain medicine. These medicines can affect your alertness and reaction times, and you could be arrested for driving under the influence (DUI). If you need to use opioids long-term, talk to your care team about driving.    DO NOT operate heavy machinery    DO NOT do any other dangerous activities while taking these medicines.    DO NOT drink any alcohol while taking these medicines.     If the opioid prescribed includes acetaminophen, DO NOT take with any other medicines that contain acetaminophen. Read all labels carefully. Look for the word  acetaminophen  or  Tylenol.  Ask your pharmacist if you have questions or are unsure.    You can get addicted to pain medicines, especially if you have a history of addiction (chemical, alcohol or substance dependence). Talk to your care team about ways to reduce this risk.    All opioids tend to cause constipation. Drink plenty of water and eat foods that have a lot of fiber, such as fruits, vegetables, prune juice, apple juice and high-fiber cereal. Take a laxative (Miralax, milk of magnesia, Colace, Senna) if you don t move your bowels at least every other day. Other side effects include upset stomach, sleepiness, dizziness, throwing up, tolerance (needing more of the medicine to have the same effect), physical dependence and slowed breathing.    Store your pills in a secure place, locked if possible. We will not replace any lost or stolen medicine. If you don t finish your medicine, please throw away (dispose) as directed by your pharmacist. The [place] [employer] has more information about safe disposal: https://www.[email or web address].us/living-green/managing-unwanted-medications             Medication List: This is a list of all your medications and when to take them. Check marks below indicate your daily home  schedule. Keep this list as a reference.      Medications           Morning Afternoon Evening Bedtime As Needed    aspirin 325 MG tablet   Take 1 tablet (325 mg) by mouth daily                                busPIRone 7.5 MG tablet   Commonly known as:  BUSPAR   Take 1 tablet (7.5 mg) by mouth 2 times daily                                cyclobenzaprine 5 MG tablet   Commonly known as:  FLEXERIL   Take 5 mg by mouth daily as needed for muscle spasms                                doxycycline 20 MG tablet   Commonly known as:  PERIOSTAT   Take 20 mg by mouth 2 times daily                                fish oil-omega-3 fatty acids 1000 MG capsule   Take 1 g by mouth daily                                * HYDROcodone-acetaminophen 5-325 MG per tablet   Commonly known as:  NORCO   Take 1-2 tablets by mouth every 4 hours as needed for moderate to severe pain                                * HYDROcodone-acetaminophen 5-325 MG per tablet   Commonly known as:  NORCO   Take 1-2 tablets by mouth every 4 hours as needed for moderate to severe pain                                MAXALT PO   Take 10 mg by mouth as needed for migraine                                metroNIDAZOLE 1 % cream   Commonly known as:  NORITATE   Apply topically daily                                order for DME   Equipment being ordered: Walker Wheels (), Walker () and Walker Platform () Treatment Diagnosis: Impaired gait                                senna-docusate 8.6-50 MG per tablet   Commonly known as:  SENOKOT-S;PERICOLACE   Take 1 tablet by mouth 2 times daily as needed for constipation                                sennosides 8.6 MG tablet   Commonly known as:  SENOKOT   Take 2 tablets by mouth 2 times daily                                traMADol 50 MG tablet   Commonly known as:  ULTRAM   Take 1 tablet (50 mg) by mouth every 6 hours as needed for moderate pain or severe pain                                Vitamin D  (Cholecalciferol) 1000 units Caps   Take 2 tablets by mouth daily                                * Notice:  This list has 2 medication(s) that are the same as other medications prescribed for you. Read the directions carefully, and ask your doctor or other care provider to review them with you.

## 2018-10-15 NOTE — H&P (VIEW-ONLY)
[facility]    History and Physical  Hospitalist       Date of Admission:  10/13/2018    Assessment & Plan   Preethi Mays is a 58 year old female with a history of rosacea, migraines, depression presents with a fall and fracture of right malleolus and left wrist.    Active Problems:    Closed fracture of malleolus of right ankle    Fracture of wrist, left, closed, initial encounter    Secondary to traumatic fall, admit to observation    PT/OT/social work to see patient    Consult Ortho.    IV fluids and  pain medications ordered.    Possible nonsurgical management for malleolus fracture, her left radius is severely comminuted ,which might need surgical fixation.    Rosacea    Continue doxycycline    Migraine without aura    On as needed Maxalt    PTSD    On Wellbutrin, continue that     DVT Prophylaxis: Pneumatic Compression Devices  Code Status: Full Code    Disposition: Expected discharge in 24 hours    Sagrario Holman MD    Primary Care Physician   JAY ROJAS    Chief Complaint   Fall one episode    History is obtained from the patient    History of Present Illness   Preethi Mays is a 58 year old female with a history of migraine, rosacea, and depression with PTSD presents to the emergency department following a fall.  Patient and her  was going [place] [employer] while she tripped and fell.  Following the fall she noticed pain on her left wrist and on her right ankle.  X-rays were done in the emergency room which indicated fractures on her right malleolus and left wrist.  She had splints placed in the ER.  Due to the location of her fractures she might need help with the mobility and the plan is to admit for further evaluation and disposition.  Past Medical History    I have reviewed this patient's medical history and updated it with pertinent information if needed.   No past medical history on file.    Past Surgical History   I have reviewed this patient's surgical history  and updated it with pertinent information if needed.  No past surgical history on file.    Prior to Admission Medications   Prior to Admission Medications   Prescriptions Last Dose Informant Patient Reported? Taking?   Rizatriptan Benzoate (MAXALT PO)   Yes No   Sig: Take 10 mg by mouth as needed for migraine   busPIRone (BUSPAR) 7.5 MG tablet   No No   Sig: Take 1 tablet (7.5 mg) by mouth 2 times daily   doxycycline (PERIOSTAT) 20 MG tablet   Yes No   Sig: Take 20 mg by mouth 2 times daily   metroNIDAZOLE (NORITATE) 1 % cream   Yes No   Sig: Apply topically daily      Facility-Administered Medications: None     Allergies   Allergies   Allergen Reactions     Keflex [Cephalexin] Hives     Penicillins      Sulfa Drugs        Social History   I have reviewed this patient's social history and updated it with pertinent information if needed. Preethi Mays  reports that she has never smoked. She has never used smokeless tobacco.    Family History   I have reviewed this patient's family history and updated it with pertinent information if needed.   No family history on file.    Review of Systems   The 10 point Review of Systems is negative other than noted in the HPI or here.     Physical Exam   Temp: 98.2  F (36.8  C) Temp src: Oral BP: 139/60 Pulse: 100   Resp: 14 SpO2: 95 % O2 Device: Oxymask Oxygen Delivery: 15 LPM  Vital Signs with Ranges  Temp:  [98.2  F (36.8  C)] 98.2  F (36.8  C)  Pulse:  [100] 100  Resp:  [14-16] 14  BP: (139)/(60) 139/60  SpO2:  [95 %-97 %] 95 %  138 lbs 0 oz    Constitutional: Awake, alert, cooperative, no apparent distress.  Eyes: Conjunctiva and pupils examined and normal.  HEENT: Moist mucous membranes, normal dentition.  Respiratory: Clear to auscultation bilaterally, no crackles or wheezing.  Cardiovascular: Regular rate and rhythm, normal S1 and S2, and no murmur noted.  GI: Soft, non-distended, non-tender, normal bowel sounds.  Lymph/Hematologic: No anterior cervical or  supraclavicular adenopathy.  Skin: No rashes, no cyanosis, no edema.  Musculoskeletal: Left upper extremity and right lower extremity in cast  Neurologic: Cranial nerves 2-12 intact, normal strength and sensation.  Psychiatric: Alert, oriented to person, place and time, no obvious anxiety or depression.    Data   Data reviewed today:  I personally reviewed No lab results found in last 7 days.    Imaging:  Recent Results (from the past 24 hour(s))   Ankle XR, G/E 3 views, right    Narrative    RIGHT ANKLE THREE OR MORE VIEWS   10/13/2018 9:03 PM     HISTORY: Pain, swelling, fall.    COMPARISON: None.    FINDINGS: A horizontally oriented minimally displaced fracture is  present involving the tip of the lateral malleolus. There is severe  soft tissue swelling. No other fractures are identified. Syndesmotic  and mortise spaces are maintained.      Impression    IMPRESSION: Minimally displaced lateral malleolus fracture.     SID RUIZ MD   XR Wrist Left G/E 3 Views    Narrative    LEFT WRIST THREE OR MORE VIEWS  10/13/2018 9:04 PM     HISTORY: Pain, swelling, fall.    COMPARISON: None.    FINDINGS: Fractures are present involving the distal radius and ulnar  styloid process. The distal radius fracture is severely comminuted and  demonstrates intra-articular extension. There is widening of the  fracture fragment with mild distraction of the anterior and posterior  distal radius. Severe soft tissue swelling is present.      Impression    IMPRESSION:   1. Severely comminuted intra-articular distal radius fracture.  2. Ulnar styloid process fracture.      SID RUIZ MD

## 2018-10-15 NOTE — ANESTHESIA PREPROCEDURE EVALUATION
Anesthesia Evaluation     . Pt has had prior anesthetic.     No history of anesthetic complications          ROS/MED HX    ENT/Pulmonary: Comment: TMJ synd     (-) sleep apnea   Neurologic:     (+)migraines,     Cardiovascular: Comment: SB       (-) BULL, orthopnea/PND and syncope   METS/Exercise Tolerance:  >4 METS   Hematologic:     (+) Anemia, -      Musculoskeletal:         GI/Hepatic:        (-) GERD   Renal/Genitourinary:         Endo:         Psychiatric: Comment: PTSD    (+) psychiatric history depression      Infectious Disease:         Malignancy:         Other: Comment: rosacea                     Physical Exam      Airway   Mallampati: II  TM distance: >3 FB  Neck ROM: full    Dental   (+) caps    Cardiovascular   Rhythm and rate: regular      Pulmonary    breath sounds clear to auscultation                    Anesthesia Plan      History & Physical Review  History and physical reviewed and following examination; no interval change.    ASA Status:  2 .        Plan for Peripheral Nerve Block   PONV prophylaxis:  Ondansetron (or other 5HT-3)       Postoperative Care      Consents  Anesthetic plan, risks, benefits and alternatives discussed with:  Patient..                          .  Procedure: Procedure(s):  OPEN REDUCTION INTERNAL FIXATION WRIST  Preop diagnosis: Left Wrist Fracture    Allergies   Allergen Reactions     Keflex [Cephalexin] Hives     Penicillins      Sulfa Drugs      Past Medical History:   Diagnosis Date     Blepharitis      Depression      PTSD (post-traumatic stress disorder)      Rosacea      TMJ (dislocation of temporomandibular joint)      History reviewed. No pertinent surgical history.  Social History   Substance Use Topics     Smoking status: Never Smoker     Smokeless tobacco: Never Used     Alcohol use Yes     Prior to Admission medications    Medication Sig Start Date End Date Taking? Authorizing Provider   busPIRone (BUSPAR) 7.5 MG tablet Take 1 tablet (7.5 mg) by mouth 2  times daily 8/24/18  Yes Cherrie Davis, APRN CNP   cyclobenzaprine (FLEXERIL) 5 MG tablet Take 5 mg by mouth daily as needed for muscle spasms   Yes Unknown, Entered By History   doxycycline (PERIOSTAT) 20 MG tablet Take 20 mg by mouth 2 times daily   Yes Reported, Patient   fish oil-omega-3 fatty acids 1000 MG capsule Take 1 g by mouth daily   Yes Unknown, Entered By History   metroNIDAZOLE (NORITATE) 1 % cream Apply topically daily   Yes Reported, Patient   Rizatriptan Benzoate (MAXALT PO) Take 10 mg by mouth as needed for migraine   Yes Reported, Patient   VITAMIN D, CHOLECALCIFEROL, PO Take 1 tablet by mouth daily   Yes Unknown, Entered By History   HYDROcodone-acetaminophen (NORCO) 5-325 MG per tablet Take 1-2 tablets by mouth every 4 hours as needed for moderate to severe pain 10/14/18   Nicol Pruett PA-C   order for DME Equipment being ordered: Walker Wheels (), Walker () and Walker Platform ()  Treatment Diagnosis: Impaired gait 10/14/18   Nicol Pruett PA-C   senna-docusate (SENOKOT-S;PERICOLACE) 8.6-50 MG per tablet Take 1 tablet by mouth 2 times daily as needed for constipation 10/14/18   Nicol Pruett PA-C     Current Facility-Administered Medications Ordered in Epic   Medication Dose Route Frequency Last Rate Last Dose     clindamycin (CLEOCIN) infusion 900 mg  900 mg Intravenous Pre-Op/Pre-procedure x 1 dose         clindamycin (CLEOCIN) infusion 900 mg  900 mg Intravenous See Admin Instructions         No current [facility]-ordered outpatient prescriptions on file.       Wt Readings from Last 1 Encounters:   10/13/18 68.9 kg (151 lb 14.4 oz)     Temp Readings from Last 1 Encounters:   10/14/18 36.6  C (97.9  F) (Oral)     BP Readings from Last 6 Encounters:   10/14/18 110/61     Pulse Readings from Last 4 Encounters:   10/13/18 100     Resp Readings from Last 1 Encounters:   10/14/18 16     SpO2 Readings from Last 1 Encounters:   10/14/18 94%     Recent Labs   Lab  Test  10/14/18   0609   NA  139   POTASSIUM  4.1   CHLORIDE  107   CO2  25   ANIONGAP  7   GLC  91   BUN  18   CR  0.74   RACHEL  8.1*     No results for input(s): AST, ALT, ALKPHOS, BILITOTAL, LIPASE in the last 54814 hours.  Recent Labs   Lab Test  10/14/18   0609   WBC  8.6   HGB  11.1*   PLT  318     No results for input(s): ABO, RH in the last 71163 hours.  No results for input(s): INR, PTT in the last 59508 hours.   No results for input(s): TROPI in the last 93537 hours.  No results for input(s): PH, PCO2, PO2, HCO3 in the last 36573 hours.  No results for input(s): HCG in the last 17442 hours.  Recent Results (from the past 744 hour(s))   Ankle XR, G/E 3 views, right    Narrative    RIGHT ANKLE THREE OR MORE VIEWS   10/13/2018 9:03 PM     HISTORY: Pain, swelling, fall.    COMPARISON: None.    FINDINGS: A horizontally oriented minimally displaced fracture is  present involving the tip of the lateral malleolus. There is severe  soft tissue swelling. No other fractures are identified. Syndesmotic  and mortise spaces are maintained.      Impression    IMPRESSION: Minimally displaced lateral malleolus fracture.     SID RUIZ MD   XR Wrist Left G/E 3 Views    Narrative    LEFT WRIST THREE OR MORE VIEWS  10/13/2018 9:04 PM     HISTORY: Pain, swelling, fall.    COMPARISON: None.    FINDINGS: Fractures are present involving the distal radius and ulnar  styloid process. The distal radius fracture is severely comminuted and  demonstrates intra-articular extension. There is widening of the  fracture fragment with mild distraction of the anterior and posterior  distal radius. Severe soft tissue swelling is present.      Impression    IMPRESSION:   1. Severely comminuted intra-articular distal radius fracture.  2. Ulnar styloid process fracture.      SID RUIZ MD   XR Wrist Left 2 Views    Narrative    LEFT WRIST TWO VIEWS      10/13/2018 10:25 PM     HISTORY: Post reduction.    COMPARISON: 10/13/2018.       Impression    IMPRESSION: Casting of the left wrist. Acute fracture of the distal  radius and ulnar styloid. There is mildly decreased impaction.       YULIANA LUO MD   CT Wrist Left w/o Contrast    Narrative    CT WRIST LEFT WITHOUT CONTRAST 10/14/2018 1:51 PM     HISTORY: Preoperative planning left distal radius fracture.     TECHNIQUE: Axial images with reconstructions. Radiation dose for this  scan was reduced using automated exposure control, adjustment of the  mA and/or kV according to patient size, or iterative reconstruction  technique.     FINDINGS: There is a comminuted distal radius fracture with articular  extension and impaction. At the articular surface, there is  distraction, displacement, and depression. There is radial and volar  displacement of comminuted fragments. There is dorsal displacement of  a comminuted fragment to a lesser degree. Positive ulnar variance.  Ulnar styloid fracture.      Impression    IMPRESSION:  1. Comminuted distal radius fracture, as above.  2. Ulnar styloid fracture. Positive ulnar variance.    CATARINA HENRY MD       RECENT LABS:   ECG:   ECHO:

## 2018-10-15 NOTE — ANESTHESIA CARE TRANSFER NOTE
Patient: Preethi Mays    Procedure(s):  OPEN REDUCTION INTERNAL FIXATION LEFT DISTAL RADIUS - Wound Class: I-Clean    Diagnosis: Left Wrist Fracture  Diagnosis Additional Information: No value filed.    Anesthesia Type:   Peripheral Nerve Block     Note:  Airway :Face Mask  Patient transferred to:PACU  Comments: At end of procedure, spontaneous respirations, patient alert to voice, able to follow commands. Oxygen via facemask at 10 liters per minute to PACU. Oxygen tubing connected to wall O2 in PACU, SpO2, NiBP, and EKG monitors and alarms on and functioning, Isela Hugger warmer connected to patient gown, report on patient's clinical status given to PACU RN, RN questions answered.Handoff Report: Identifed the Patient, Identified the Reponsible Provider, Reviewed the pertinent medical history, Discussed the surgical course, Reviewed Intra-OP anesthesia mangement and issues during anesthesia, Set expectations for post-procedure period and Allowed opportunity for questions and acknowledgement of understanding      Vitals: (Last set prior to Anesthesia Care Transfer)    CRNA VITALS  10/15/2018 1735 - 10/15/2018 1811      10/15/2018             Pulse: 69    SpO2: 98 %    Resp Rate (set): 10                Electronically Signed By: RICHARD Huggins CRNA  October 15, 2018  6:11 PM

## 2018-10-15 NOTE — IP AVS SNAPSHOT
[place] [address]    [place] [address]    Phone:  [phone]                                       After Visit Summary   10/15/2018    Preethi Mays    MRN: [id number]           After Visit Summary Signature Page     I have received my discharge instructions, and my questions have been answered. I have discussed any challenges I see with this plan with the nurse or doctor.    ..........................................................................................................................................  Patient/Patient Representative Signature      ..........................................................................................................................................  Patient Representative Print Name and Relationship to Patient    ..................................................               ................................................  Date                                   Time    ..........................................................................................................................................  Reviewed by Signature/Title    ...................................................              ..............................................  Date                                               Time          22EPIC Rev 08/18

## 2018-10-15 NOTE — DISCHARGE INSTRUCTIONS
Same Day Surgery Discharge Instructions for  Sedation and General Anesthesia       It's not unusual to feel dizzy, light-headed or faint for up to 24 hours after surgery or while taking pain medication.  If you have these symptoms: sit for a few minutes before standing and have someone assist you when you get up to walk or use the bathroom.      You should rest and relax for the next 24 hours. We recommend you make arrangements to have an adult stay with you for at least 24 hours after your discharge.  Avoid hazardous and strenuous activity.      DO NOT DRIVE any vehicle or operate mechanical equipment for 24 hours following the end of your surgery.  Even though you may feel normal, your reactions may be affected by the medication you have received.      Do not drink alcoholic beverages for 24 hours following surgery.       Slowly progress to your regular diet as you feel able. It's not unusual to feel nauseated and/or vomit after receiving anesthesia.  If you develop these symptoms, drink clear liquids (apple juice, ginger ale, broth, 7-up, etc. ) until you feel better.  If your nausea and vomiting persists for 24 hours, please notify your surgeon.        All narcotic pain medications, along with inactivity and anesthesia, can cause constipation. Drinking plenty of liquids and increasing fiber intake will help.      For any questions of a medical nature, call your surgeon.      Do not make important decisions for 24 hours.      If you had general anesthesia, you may have a sore throat for a couple of days related to the breathing tube used during surgery.  You may use Cepacol lozenges to help with this discomfort.  If it worsens or if you develop a fever, contact your surgeon.       If you feel your pain is not well managed with the pain medications prescribed by your surgeon, please contact your surgeon's office to let them know so they can address your concerns.     Reasons to contact your surgeon:    1. Signs of  possible infection: Check your incision daily for redness, swelling, warmth, red streaks or foul drainage.   2. Elevated temperature.  3. Pain not controlled with pain medication and/or rest.   4. Uncontrolled nausea or vomiting.  5. Any questions or concerns.                NWB to left upper extremity x6 weeks.   Keep splint clean, dry, intact.   Place bag over splint while showering.   Take Norco and/or Tramadol as needed for pain.   Take Senna for constipation.   Ice and elevate left extremity.   Take 2,000 international units of Vitamin D daily for bone health.   Take Aspirin 325 mg qday PO x6 weeks     Please follow up with Dr. Whelan with [facility] in 2-3 weeks for recheck. Call his care coordinator Natalya at [phone] to make an appointment.

## 2018-10-16 NOTE — OR NURSING
Pt discharged to home self care. Instructions reviewed with spouse questions answered, pt voided and is able to tolerate po intake

## 2018-10-16 NOTE — OP NOTE
Procedure Date: 10/15/2018      PREOPERATIVE DIAGNOSIS:  Left intra-articular comminuted distal radius fracture, greater than three parts.      POSTOPERATIVE DIAGNOSIS:  Left intra-articular comminuted distal radius fracture, greater than three parts.      PROCEDURE PERFORMED:  Open reduction and internal fixation comminuted intraarticular distal radius fracture, greater than three parts.      SURGEON:  Hesham Whelan MD      ANESTHETIC:  Proximal nerve block.       ESTIMATED BLOOD LOSS:  20 mL      FINDINGS:  Osteoporotic bone, comminuted, with multiple intraarticular segments including the volar shear.  Restoration of radial height, inclination, and angulation.      IMPLANTS USED:  Synthes variable angle distal radius locking plate.      INDICATIONS:  A 58-year-old female who slipped and fell, having an ankle fracture and a left distal radius fracture.  She was splinted in the ED, which did improve alignment.  However, she had persisting coronal deformity and loss of radial height.  Discussed operative versus nonoperative treatment.  The risks and benefits of each and the risk of surgery and injury were discussed included but were not limited to bleeding, infection, damage to surrounding neurovascular structures and tendons, wrist stiffness, continued pain, malunion, delayed union, nonunion, post-traumatic arthritis, and anesthetic complications.  She understands and wishes to proceed.  Consent sign.      DESCRIPTION OF PROCEDURE:  The patient was identified in the preoperative holding area per hospital policy and the correct op site marked.  She was given a proximal nerve block and transferred to the OR.  Chlorhexidine prescrub and ChloraPrep.  Standard drape.  A timeout was performed.  All in the room agreed.  Tourniquet inflated for a total of 44 minutes.  Infiltrated surgical site with 0.5% lidocaine with epinephrine.  Made a standard volar approach to the distal radius dissecting through the skin and  subcutaneous tissue, identifying the FCR tendon, incising the fascia, and retracting the neurovascular structures, and releasing radial border of pronator quadratus and BR insertion.  A comminuted fracture with osteoporotic bone.  Cleaned the fracture site and recreated deformity, pulled traction, and using a [place] reduction.  Laminar  used to assist with coronal alignment.  Secured both radial styloid and anterior to posterior K-wires.  We selected a Synthes variable angle distal radius plate, adjusted height, and first placed a 2.7 cortical screw in the oblong hole.  Adjusted height and placement, then placed distal locking screws under fluoroscopic guidance distally.  We then placed additional shaft screws, reassuring the volar angulation nicely.  AP, lateral, and oblique views showed restoration of radial height, alignment, and angulation.  The patient did have significant comminution and articular damage.  Safe position of the implants.  Tourniquet let down.  Hemostasis obtained.  Thoroughly irrigated.  Wound closed with 3-0 Vicryl subcutaneous and 4-0 Monocryl subcuticular.  Steri-Strips, sterile dressing, and volar slab splint applied.  Awakened and transferred stable in  the PACU.      POSTOPERATIVE PLAN:     1.  Primary care provider followup within one to two months for osteoporosis  workup and treatment.   2.  Nonweightbearing x6 weeks.  No lifting heavier than a coffee cup.   3.  Pain control alternated with Motrin and Tylenol, with breakthrough tramadol.   4.  Vitamin D and calcium.   5.  Follow up in two weeks for a wound check.  Cut suture tails and given a removal Velcro brace.  Will make an eight-week followup.         AUGUST HARMON MD             D: 10/15/2018   T: 10/16/2018   MT: LUI      Name:     RA BRUNO   MRN:      [id number]        Account:        [id number]   :      1960           Procedure Date: 10/15/2018      Document: [id number]

## 2018-10-16 NOTE — BRIEF OP NOTE
[facility] Brief Operative Note    Pre-operative diagnosis: Left Wrist Fracture   Post-operative diagnosis Left distal radius fracture    Procedure: Procedure(s):  OPEN REDUCTION INTERNAL FIXATION LEFT DISTAL RADIUS - Wound Class: I-Clean   Surgeon(s): Surgeon(s) and Role:     * Hesham Whelan MD - Primary   Estimated blood loss: 20 mL    Specimens: * No specimens in log *   Findings: See full operative note.     Plan:  LUE splint, no carrying heavier than coffee cup x 6 weeks  Ice.   Tylenol/Ibuprofen PRN pain    mg PO qday x 6 weeks for DVT prophylaxis   Follow up in clinic in 2 weeks for recheck and X-rays  PCP follow up for osteoporosis work up.

## 2018-10-17 LAB — INTERPRETATION ECG - MUSE: NORMAL

## 2018-11-16 ENCOUNTER — OFFICE VISIT (OUTPATIENT)
Dept: PSYCHIATRY | Facility: CLINIC | Age: 58
End: 2018-11-16
Attending: NURSE PRACTITIONER
Payer: COMMERCIAL

## 2018-11-16 VITALS
WEIGHT: 142 LBS | SYSTOLIC BLOOD PRESSURE: 116 MMHG | DIASTOLIC BLOOD PRESSURE: 79 MMHG | HEART RATE: 78 BPM | BODY MASS INDEX: 24.37 KG/M2

## 2018-11-16 DIAGNOSIS — F43.10 PTSD (POST-TRAUMATIC STRESS DISORDER): ICD-10-CM

## 2018-11-16 PROCEDURE — G0463 HOSPITAL OUTPT CLINIC VISIT: HCPCS | Mod: ZF

## 2018-11-16 RX ORDER — BUSPIRONE HYDROCHLORIDE 7.5 MG/1
7.5 TABLET ORAL 2 TIMES DAILY
Qty: 60 TABLET | Refills: 2 | Status: SHIPPED | OUTPATIENT
Start: 2018-11-16 | End: 2019-02-12

## 2018-11-16 ASSESSMENT — PAIN SCALES - GENERAL: PAINLEVEL: MODERATE PAIN (5)

## 2018-11-16 NOTE — MR AVS SNAPSHOT
After Visit Summary   11/16/2018    Preethi Mays    MRN: [id number]           Patient Information     Date Of Birth          1960        Visit Information        Provider Department      11/16/2018 9:00 AM Cherrie Davis APRN CNP [facility]        Today's Diagnoses     PTSD (post-traumatic stress disorder)           Follow-ups after your visit        Follow-up notes from your care team     Return in about 12 weeks (around 2/8/2019), or if symptoms worsen or fail to improve, for BP Recheck, Routine Visit.      Your next 10 appointments already scheduled     Feb 12, 2019  3:30 PM CST   Adult Med Follow UP with RICHARD Cantu CNP   Psychiatry Clinic ([facility])    [place] [id number] [address] [phone]              Who to contact     Please call your clinic at [phone] to:    Ask questions about your health    Make or cancel appointments    Discuss your medicines    Learn about your test results    Speak to your doctor            Additional Information About Your Visit        MyChart Information     TableApp gives you secure access to your electronic health record. If you see a primary care provider, you can also send messages to your care team and make appointments. If you have questions, please call your primary care clinic.  If you do not have a primary care provider, please call [phone] and they will assist you.      TableApp is an electronic gateway that provides easy, online access to your medical records. With TableApp, you can request a clinic appointment, read your test results, renew a prescription or communicate with your care team.     To access your existing account, please contact your [facility] or call [phone] for assistance.        Care EveryWhere ID     This is your Care EveryWhere ID. This could be used by other organizations to access your  [facility] medical records  [id number]        Your Vitals Were     Pulse BMI (Body Mass Index)                78 24.37 kg/m2           Blood Pressure from Last 3 Encounters:   11/16/18 116/79   10/15/18 116/74   10/14/18 110/61    Weight from Last 3 Encounters:   11/16/18 64.4 kg (142 lb)   10/15/18 67.1 kg (148 lb)   10/13/18 68.9 kg (151 lb 14.4 oz)              Today, you had the following     No orders found for display         Where to get your medicines      These medications were sent to [id number] - [place], [place] - [address]  [address], [facility] [address]     Phone:  [phone]     busPIRone 7.5 MG tablet          Primary Care Provider Office Phone # Fax #    Octaviano Hathaway [phone]       IVAN MOORE [address] [facility] [address]  [facility] [address]        Equal Access to Services     [facility]: Hadii aad ku hadasho Soomaali, waaxda luqadaha, qaybta kaalmada adeegyada, waxay idiin haypurvi santiago . So [facility] [phone].    ATENCIÓN: Si habla español, tiene a armas disposición servicios gratuitos de asistencia lingüística. Llame al [phone].    We comply with applicable federal civil rights laws and [place] laws. We do not discriminate on the basis of race, color, national origin, age, disability, sex, sexual orientation, or gender identity.            Thank you!     Thank you for choosing PSYCHIATRY CLINIC  for your care. Our goal is always to provide you with excellent care. Hearing back from our patients is one way we can continue to improve our services. Please take a few minutes to complete the written survey that you may receive in the mail after your visit with us. Thank you!             Your Updated Medication List - Protect others around you: Learn how to safely use, store and throw away your medicines at www.disposemymeds.org.          This list is accurate as of 11/16/18 11:59 PM.  Always use your most recent med  list.                   Brand Name Dispense Instructions for use Diagnosis    aspirin 325 MG tablet     42 tablet    Take 1 tablet (325 mg) by mouth daily    Fracture of wrist, left, closed, initial encounter       busPIRone 7.5 MG tablet    BUSPAR    60 tablet    Take 1 tablet (7.5 mg) by mouth 2 times daily    PTSD (post-traumatic stress disorder)       cyclobenzaprine 5 MG tablet    FLEXERIL     Take 5 mg by mouth daily as needed for muscle spasms        doxycycline 20 MG tablet    PERIOSTAT     Take 20 mg by mouth 2 times daily        fish oil-omega-3 fatty acids 1000 MG capsule      Take 1 g by mouth daily        * HYDROcodone-acetaminophen 5-325 MG per tablet    NORCO    20 tablet    Take 1-2 tablets by mouth every 4 hours as needed for moderate to severe pain    Closed fracture of left wrist, initial encounter       * HYDROcodone-acetaminophen 5-325 MG per tablet    NORCO    20 tablet    Take 1-2 tablets by mouth every 4 hours as needed for moderate to severe pain    Fracture of wrist, left, closed, initial encounter       MAXALT PO      Take 10 mg by mouth as needed for migraine        metroNIDAZOLE 1 % cream    NORITATE     Apply topically daily        order for DME     1 each    Equipment being ordered: Walker Wheels (), Walker () and Walker Platform () Treatment Diagnosis: Impaired gait    Closed fracture of left wrist, initial encounter, Closed fracture of right ankle, initial encounter       senna-docusate 8.6-50 MG per tablet    SENOKOT-S;PERICOLACE    100 tablet    Take 1 tablet by mouth 2 times daily as needed for constipation    Constipation, unspecified constipation type       sennosides 8.6 MG tablet    SENOKOT    120 tablet    Take 2 tablets by mouth 2 times daily    Fracture of wrist, left, closed, initial encounter       traMADol 50 MG tablet    ULTRAM    20 tablet    Take 1 tablet (50 mg) by mouth every 6 hours as needed for moderate pain or severe pain    Fracture of wrist,  left, closed, initial encounter       Vitamin D (Cholecalciferol) 1000 units Caps     100 capsule    Take 2 tablets by mouth daily    Fracture of wrist, left, closed, initial encounter       * Notice:  This list has 2 medication(s) that are the same as other medications prescribed for you. Read the directions carefully, and ask your doctor or other care provider to review them with you.

## 2019-02-12 ENCOUNTER — OFFICE VISIT (OUTPATIENT)
Dept: PSYCHIATRY | Facility: CLINIC | Age: 59
End: 2019-02-12
Attending: NURSE PRACTITIONER
Payer: COMMERCIAL

## 2019-02-12 VITALS
SYSTOLIC BLOOD PRESSURE: 123 MMHG | DIASTOLIC BLOOD PRESSURE: 82 MMHG | BODY MASS INDEX: 24.92 KG/M2 | HEART RATE: 75 BPM | WEIGHT: 145.2 LBS

## 2019-02-12 DIAGNOSIS — F43.10 PTSD (POST-TRAUMATIC STRESS DISORDER): ICD-10-CM

## 2019-02-12 PROCEDURE — G0463 HOSPITAL OUTPT CLINIC VISIT: HCPCS | Mod: ZF

## 2019-02-12 RX ORDER — BUSPIRONE HYDROCHLORIDE 7.5 MG/1
7.5 TABLET ORAL 2 TIMES DAILY
Qty: 60 TABLET | Refills: 2 | Status: SHIPPED | OUTPATIENT
Start: 2019-02-12 | End: 2019-05-16

## 2019-02-12 ASSESSMENT — PAIN SCALES - GENERAL: PAINLEVEL: NO PAIN (0)
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
## 2019-02-13 ASSESSMENT — PATIENT HEALTH QUESTIONNAIRE - PHQ9: SUM OF ALL RESPONSES TO PHQ QUESTIONS 1-9: 3

## 2019-05-16 ENCOUNTER — OFFICE VISIT (OUTPATIENT)
Dept: PSYCHIATRY | Facility: CLINIC | Age: 59
End: 2019-05-16
Attending: NURSE PRACTITIONER
Payer: COMMERCIAL

## 2019-05-16 VITALS
HEART RATE: 78 BPM | SYSTOLIC BLOOD PRESSURE: 131 MMHG | DIASTOLIC BLOOD PRESSURE: 82 MMHG | WEIGHT: 144 LBS | BODY MASS INDEX: 24.72 KG/M2

## 2019-05-16 DIAGNOSIS — F43.10 PTSD (POST-TRAUMATIC STRESS DISORDER): ICD-10-CM

## 2019-05-16 PROCEDURE — G0463 HOSPITAL OUTPT CLINIC VISIT: HCPCS | Mod: ZF

## 2019-05-16 RX ORDER — BUSPIRONE HYDROCHLORIDE 7.5 MG/1
7.5 TABLET ORAL 2 TIMES DAILY
Qty: 60 TABLET | Refills: 2 | Status: SHIPPED | OUTPATIENT
Start: 2019-05-16 | End: 2019-08-12

## 2019-05-16 ASSESSMENT — PATIENT HEALTH QUESTIONNAIRE - PHQ9: SUM OF ALL RESPONSES TO PHQ QUESTIONS 1-9: 3

## 2019-05-16 ASSESSMENT — PAIN SCALES - GENERAL: PAINLEVEL: NO PAIN (0)
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
## 2019-07-09 ENCOUNTER — TRANSFERRED RECORDS (OUTPATIENT)
Dept: HEALTH INFORMATION MANAGEMENT | Facility: CLINIC | Age: 59
End: 2019-07-09

## 2019-07-23 ENCOUNTER — TRANSFERRED RECORDS (OUTPATIENT)
Dept: HEALTH INFORMATION MANAGEMENT | Facility: CLINIC | Age: 59
End: 2019-07-23

## 2019-08-12 ENCOUNTER — OFFICE VISIT (OUTPATIENT)
Dept: PSYCHIATRY | Facility: CLINIC | Age: 59
End: 2019-08-12
Attending: NURSE PRACTITIONER
Payer: COMMERCIAL

## 2019-08-12 VITALS
WEIGHT: 145.6 LBS | DIASTOLIC BLOOD PRESSURE: 72 MMHG | HEART RATE: 70 BPM | BODY MASS INDEX: 24.99 KG/M2 | SYSTOLIC BLOOD PRESSURE: 111 MMHG

## 2019-08-12 DIAGNOSIS — F43.10 PTSD (POST-TRAUMATIC STRESS DISORDER): ICD-10-CM

## 2019-08-12 PROCEDURE — G0463 HOSPITAL OUTPT CLINIC VISIT: HCPCS | Mod: ZF

## 2019-08-12 RX ORDER — BUSPIRONE HYDROCHLORIDE 7.5 MG/1
7.5 TABLET ORAL 2 TIMES DAILY
Qty: 60 TABLET | Refills: 5 | Status: SHIPPED | OUTPATIENT
Start: 2019-08-12 | End: 2020-01-31

## 2019-08-12 ASSESSMENT — PAIN SCALES - GENERAL: PAINLEVEL: NO PAIN (0)
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
## 2019-08-12 NOTE — NURSING NOTE
"Chief Complaint   Patient presents with     Recheck Medication     PTSD     Patient states recently \"diagnosed with cancer\".Shelley Hutchison/MALLORY 8/12/2019    "

## 2019-08-15 ASSESSMENT — PATIENT HEALTH QUESTIONNAIRE - PHQ9: SUM OF ALL RESPONSES TO PHQ QUESTIONS 1-9: 2

## 2019-09-30 ENCOUNTER — HEALTH MAINTENANCE LETTER (OUTPATIENT)
Age: 59
End: 2019-09-30

## 2020-01-31 ENCOUNTER — OFFICE VISIT (OUTPATIENT)
Dept: PSYCHIATRY | Facility: CLINIC | Age: 60
End: 2020-01-31
Attending: NURSE PRACTITIONER
Payer: COMMERCIAL

## 2020-01-31 VITALS
WEIGHT: 139.6 LBS | DIASTOLIC BLOOD PRESSURE: 74 MMHG | SYSTOLIC BLOOD PRESSURE: 119 MMHG | HEART RATE: 64 BPM | BODY MASS INDEX: 23.96 KG/M2

## 2020-01-31 DIAGNOSIS — F43.10 PTSD (POST-TRAUMATIC STRESS DISORDER): ICD-10-CM

## 2020-01-31 PROCEDURE — G0463 HOSPITAL OUTPT CLINIC VISIT: HCPCS | Mod: ZF

## 2020-01-31 RX ORDER — BUSPIRONE HYDROCHLORIDE 7.5 MG/1
7.5 TABLET ORAL 2 TIMES DAILY
Qty: 60 TABLET | Refills: 5 | Status: SHIPPED | OUTPATIENT
Start: 2020-01-31 | End: 2020-07-30

## 2020-01-31 ASSESSMENT — PAIN SCALES - GENERAL: PAINLEVEL: SEVERE PAIN (6)

## 2020-02-03 ENCOUNTER — HOSPITAL ENCOUNTER (OUTPATIENT)
Facility: CLINIC | Age: 60
End: 2020-02-03
Attending: COLON & RECTAL SURGERY | Admitting: COLON & RECTAL SURGERY
Payer: COMMERCIAL

## 2020-05-04 DIAGNOSIS — Z20.822 ENCOUNTER FOR LABORATORY TESTING FOR COVID-19 VIRUS: Primary | ICD-10-CM

## 2020-06-14 DIAGNOSIS — Z20.822 ENCOUNTER FOR LABORATORY TESTING FOR COVID-19 VIRUS: ICD-10-CM

## 2020-06-14 PROCEDURE — U0003 INFECTIOUS AGENT DETECTION BY NUCLEIC ACID (DNA OR RNA); SEVERE ACUTE RESPIRATORY SYNDROME CORONAVIRUS 2 (SARS-COV-2) (CORONAVIRUS DISEASE [COVID-19]), AMPLIFIED PROBE TECHNIQUE, MAKING USE OF HIGH THROUGHPUT TECHNOLOGIES AS DESCRIBED BY CMS-2020-01-R: HCPCS | Mod: 90 | Performed by: COLON & RECTAL SURGERY

## 2020-06-14 PROCEDURE — 99000 SPECIMEN HANDLING OFFICE-LAB: CPT | Performed by: COLON & RECTAL SURGERY

## 2020-06-15 LAB
SARS-COV-2 RNA SPEC QL NAA+PROBE: NOT DETECTED
SPECIMEN SOURCE: NORMAL

## 2020-06-16 ENCOUNTER — HOSPITAL ENCOUNTER (OUTPATIENT)
Facility: CLINIC | Age: 60
Discharge: HOME | End: 2020-06-16
Attending: COLON & RECTAL SURGERY | Admitting: COLON & RECTAL SURGERY
Payer: COMMERCIAL

## 2020-06-16 VITALS
OXYGEN SATURATION: 100 % | HEIGHT: 64 IN | WEIGHT: 135 LBS | DIASTOLIC BLOOD PRESSURE: 63 MMHG | BODY MASS INDEX: 23.05 KG/M2 | RESPIRATION RATE: 17 BRPM | HEART RATE: 49 BPM | SYSTOLIC BLOOD PRESSURE: 97 MMHG

## 2020-06-16 LAB — COLONOSCOPY: NORMAL

## 2020-06-16 PROCEDURE — 25000128 H RX IP 250 OP 636: Performed by: COLON & RECTAL SURGERY

## 2020-06-16 PROCEDURE — 25800030 ZZH RX IP 258 OP 636: Performed by: COLON & RECTAL SURGERY

## 2020-06-16 PROCEDURE — G0121 COLON CA SCRN NOT HI RSK IND: HCPCS | Performed by: COLON & RECTAL SURGERY

## 2020-06-16 PROCEDURE — 45378 DIAGNOSTIC COLONOSCOPY: CPT | Performed by: COLON & RECTAL SURGERY

## 2020-06-16 PROCEDURE — 99153 MOD SED SAME PHYS/QHP EA: CPT | Performed by: COLON & RECTAL SURGERY

## 2020-06-16 PROCEDURE — G0500 MOD SEDAT ENDO SERVICE >5YRS: HCPCS | Performed by: COLON & RECTAL SURGERY

## 2020-06-16 RX ORDER — FENTANYL CITRATE 50 UG/ML
INJECTION, SOLUTION INTRAMUSCULAR; INTRAVENOUS PRN
Status: DISCONTINUED | OUTPATIENT
Start: 2020-06-16 | End: 2020-06-16 | Stop reason: HOSPADM

## 2020-06-16 RX ORDER — DIPHENHYDRAMINE HYDROCHLORIDE 50 MG/ML
INJECTION INTRAMUSCULAR; INTRAVENOUS PRN
Status: DISCONTINUED | OUTPATIENT
Start: 2020-06-16 | End: 2020-06-16 | Stop reason: HOSPADM

## 2020-06-16 RX ORDER — ONDANSETRON 2 MG/ML
4 INJECTION INTRAMUSCULAR; INTRAVENOUS EVERY 6 HOURS PRN
Status: DISCONTINUED | OUTPATIENT
Start: 2020-06-16 | End: 2020-06-16 | Stop reason: HOSPADM

## 2020-06-16 RX ORDER — FLUMAZENIL 0.1 MG/ML
0.2 INJECTION, SOLUTION INTRAVENOUS
Status: DISCONTINUED | OUTPATIENT
Start: 2020-06-16 | End: 2020-06-16 | Stop reason: HOSPADM

## 2020-06-16 RX ORDER — ONDANSETRON 4 MG/1
4 TABLET, ORALLY DISINTEGRATING ORAL EVERY 6 HOURS PRN
Status: DISCONTINUED | OUTPATIENT
Start: 2020-06-16 | End: 2020-06-16 | Stop reason: HOSPADM

## 2020-06-16 RX ORDER — LIDOCAINE 40 MG/G
CREAM TOPICAL
Status: DISCONTINUED | OUTPATIENT
Start: 2020-06-16 | End: 2020-06-16 | Stop reason: HOSPADM

## 2020-06-16 RX ORDER — DIPHENHYDRAMINE HYDROCHLORIDE 50 MG/ML
25 INJECTION INTRAMUSCULAR; INTRAVENOUS EVERY 4 HOURS PRN
Status: DISCONTINUED | OUTPATIENT
Start: 2020-06-16 | End: 2020-06-16 | Stop reason: HOSPADM

## 2020-06-16 RX ORDER — ONDANSETRON 2 MG/ML
4 INJECTION INTRAMUSCULAR; INTRAVENOUS
Status: DISCONTINUED | OUTPATIENT
Start: 2020-06-16 | End: 2020-06-16 | Stop reason: HOSPADM

## 2020-06-16 RX ORDER — DIPHENHYDRAMINE HCL 25 MG
25 CAPSULE ORAL EVERY 4 HOURS PRN
Status: DISCONTINUED | OUTPATIENT
Start: 2020-06-16 | End: 2020-06-16 | Stop reason: HOSPADM

## 2020-06-16 RX ORDER — NALOXONE HYDROCHLORIDE 0.4 MG/ML
.1-.4 INJECTION, SOLUTION INTRAMUSCULAR; INTRAVENOUS; SUBCUTANEOUS
Status: DISCONTINUED | OUTPATIENT
Start: 2020-06-16 | End: 2020-06-16 | Stop reason: HOSPADM

## 2020-06-16 ASSESSMENT — MIFFLIN-ST. JEOR: SCORE: 1162.36

## 2020-06-16 NOTE — H&P
Pre-Endoscopy History and Physical     Preethi Mays MRN# [id number]   YOB: 1960 Age: 60 year old     Date of Procedure: 06/16/20  Primary care provider: Octaviano Hathaway  Type of Endoscopy: Colonoscopy  Reason for Procedure: screening   Type of Anesthesia Anticipated: Moderate Sedation    HPI:    Preethi is a 60 year old female who will be undergoing the above procedure.      A history and physical has been performed. The patient's medications and allergies have been reviewed. The risks and benefits of the procedure and the sedation options and risks were discussed with the patient.  All questions were answered and informed consent was obtained.      She denies a personal or family history of anesthesia complications or bleeding disorders.     Allergies   Allergen Reactions     Keflex [Cephalexin] Hives     Penicillins      Sulfa Drugs         No current facility-administered medications on file prior to encounter.   busPIRone (BUSPAR) 7.5 MG tablet, Take 1 tablet (7.5 mg) by mouth 2 times daily  cyclobenzaprine (FLEXERIL) 5 MG tablet, Take 5 mg by mouth daily as needed for muscle spasms  doxycycline (PERIOSTAT) 20 MG tablet, Take 20 mg by mouth 2 times daily  fish oil-omega-3 fatty acids 1000 MG capsule, Take 1 g by mouth daily  Melatonin 2.5 MG CAPS, Take 1 tablet by mouth nightly as needed  Rizatriptan Benzoate (MAXALT PO), Take 10 mg by mouth as needed for migraine  Vitamin D, Cholecalciferol, 1000 units CAPS, Take 2 tablets by mouth daily  aspirin 325 MG tablet, Take 1 tablet (325 mg) by mouth daily  HYDROcodone-acetaminophen (NORCO) 5-325 MG per tablet, Take 1-2 tablets by mouth every 4 hours as needed for moderate to severe pain  HYDROcodone-acetaminophen (NORCO) 5-325 MG per tablet, Take 1-2 tablets by mouth every 4 hours as needed for moderate to severe pain  metroNIDAZOLE (NORITATE) 1 % cream, Apply topically daily  order for DME, Equipment being ordered: Walker Wheels (), Walker  "() and Walker Platform ()  Treatment Diagnosis: Impaired gait  senna-docusate (SENOKOT-S;PERICOLACE) 8.6-50 MG per tablet, Take 1 tablet by mouth 2 times daily as needed for constipation  sennosides (SENOKOT) 8.6 MG tablet, Take 2 tablets by mouth 2 times daily  traMADol (ULTRAM) 50 MG tablet, Take 1 tablet (50 mg) by mouth every 6 hours as needed for moderate pain or severe pain        Patient Active Problem List   Diagnosis     Rosacea     Migraine without aura     Closed fracture of malleolus of right ankle     Fracture of wrist, left, closed, initial encounter     Wrist fracture        Past Medical History:   Diagnosis Date     Blepharitis      Depression      PTSD (post-traumatic stress disorder)      Rosacea      TMJ (dislocation of temporomandibular joint)         Past Surgical History:   Procedure Laterality Date     BREAST SURGERY      R) lumpectomy inclucing lymph nodes     COLONOSCOPY       GI SURGERY      hemerrhoid surgery x2     OPEN REDUCTION INTERNAL FIXATION WRIST Left 10/15/2018    Procedure: OPEN REDUCTION INTERNAL FIXATION LEFT DISTAL RADIUS;  Surgeon: Hesham Whelan MD;  Location:  OR       Social History     Tobacco Use     Smoking status: Never Smoker     Smokeless tobacco: Never Used   Substance Use Topics     Alcohol use: Yes       Family History   Problem Relation Age of Onset     Coronary Artery Disease Mother      Breast Cancer Mother      Coronary Artery Disease Father        REVIEW OF SYSTEMS:     5 point ROS negative except as noted above in HPI, including Gen., Resp., CV, GI &  system review.      PHYSICAL EXAM:   BP 97/65   Pulse 67   Resp 8   Ht 1.626 m (5' 4\")   Wt 61.2 kg (135 lb)   SpO2 100%   BMI 23.17 kg/m   Estimated body mass index is 23.17 kg/m  as calculated from the following:    Height as of this encounter: 1.626 m (5' 4\").    Weight as of this encounter: 61.2 kg (135 lb).   GENERAL APPEARANCE: healthy and alert  MENTAL STATUS: alert  AIRWAY " EXAM: Mallampatti Class II (visualization of the soft palate, fauces, and uvula)  RESP: lungs clear to auscultation - no rales, rhonchi or wheezes  CV: regular rates and rhythm      IMPRESSION   ASA Class 3 - Severe systemic disease, but not incapacitating        PLAN:     Plan for colonoscopy. We discussed the risks, benefits and alternatives and the patient wished to proceed.    The above has been forwarded to the consulting provider.      Natalya Juarez MD  [facility]  Phone: [phone]  Fax: [phone]  June 16, 2020

## 2020-06-16 NOTE — OP NOTE
See Provation Note In Chart    Natalya Juarez MD  [facility]  Office Phone # [phone]

## 2020-07-30 ENCOUNTER — VIRTUAL VISIT (OUTPATIENT)
Dept: PSYCHIATRY | Facility: CLINIC | Age: 60
End: 2020-07-30
Attending: NURSE PRACTITIONER
Payer: COMMERCIAL

## 2020-07-30 DIAGNOSIS — F43.10 PTSD (POST-TRAUMATIC STRESS DISORDER): ICD-10-CM

## 2020-07-30 RX ORDER — BUSPIRONE HYDROCHLORIDE 7.5 MG/1
7.5 TABLET ORAL 2 TIMES DAILY
Qty: 60 TABLET | Refills: 5 | Status: SHIPPED | OUTPATIENT
Start: 2020-07-30 | End: 2020-11-11

## 2020-07-30 ASSESSMENT — PAIN SCALES - GENERAL: PAINLEVEL: NO PAIN (0)
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
## 2020-07-30 NOTE — PATIENT INSTRUCTIONS
Thank you for coming to the PSYCHIATRY CLINIC.    Lab Testing:  If you had lab testing today and your results are reassuring or normal they will be mailed to you or sent through [facility] within 7 days. If the lab tests need quick action we will call you with the results. The phone number we will call with results is # [phone] (home) . If this is not the best number please call our clinic and change the number.    Medication Refills:  If you need any refills please call your pharmacy and they will contact us. Our fax number for refills is [phone]. Please allow three business for refill processing. If you need to  your refill at a new pharmacy, please contact the new pharmacy directly. The new pharmacy will help you get your medications transferred.     Scheduling:  If you have any concerns about today's visit or wish to schedule another appointment please call our office during normal business hours [phone] (8-5:00 M-F)    Contact Us:  Please call [phone] during business hours (8-5:00 M-F).  If after clinic hours, or on the weekend, please call  [phone].    Financial Assistance [phone]  [facility] Billing [phone]  [facility] Billing Office, [facility]: [phone]  [facility] Billing [phone]  Medical Records [phone]      MENTAL HEALTH CRISIS NUMBERS:  For a medical emergency please call  911 or go to the nearest ER.     [facility]:   [facility] -[phone]   Crisis Residence [facility] -[phone]   Walk-In Counseling [facility] -[phone]   COPE 24/7 [place] Mobile Team -[phone] (adults)/[phone] (child)  CHILD: [facility] needs assessment team - [phone]      [facility]:   [facility] - [phone]   Walk-in counseling [facility] - [phone]   Walk-in counseling [facility] - [phone]   Crisis Residence [facility] - [phone]  Urgent  [facility]-[phone] mobile unit/ 24/7 crisis line    National Crisis Numbers:   National Suicide Prevention Lifeline: 3-657-913-TALK (955-866-2262)  Poison Control Center - 4-756-792-6164  Jobyal/resources for a list of additional resources (SOS)  Trans Lifeline a hotline for transgender people 5-372-871-7177  The Anastacio Project a hotline for LGBT youth 1-925.767.5415  Crisis Text Line: For any crisis 24/7   To: 060161  see www.crisistextline.org  - IF MAKING A CALL FEELS TOO HARD, send a text!         Again thank you for choosing PSYCHIATRY CLINIC and please let us know how we can best partner with you to improve you and your family's health.    You may be receiving a survey regarding this appointment. We would love to have your feedback, both positive and negative. The survey is done by an external company, so your answers are anonymous.

## 2020-11-11 DIAGNOSIS — F43.10 PTSD (POST-TRAUMATIC STRESS DISORDER): ICD-10-CM

## 2020-11-11 RX ORDER — BUSPIRONE HYDROCHLORIDE 10 MG/1
10 TABLET ORAL 2 TIMES DAILY
Qty: 60 TABLET | Refills: 1 | Status: SHIPPED | OUTPATIENT
Start: 2020-11-11 | End: 2021-01-08

## 2021-01-08 DIAGNOSIS — F43.10 PTSD (POST-TRAUMATIC STRESS DISORDER): ICD-10-CM

## 2021-01-08 RX ORDER — BUSPIRONE HYDROCHLORIDE 10 MG/1
10 TABLET ORAL 2 TIMES DAILY
Qty: 60 TABLET | Refills: 0 | Status: SHIPPED | OUTPATIENT
Start: 2021-01-08 | End: 2021-01-22

## 2021-01-08 NOTE — TELEPHONE ENCOUNTER
Last Seen 07/30/2020    RTC 6 months    Cancel 0  No-Show 0    Next Appt 01/22/2021 at 0900    Incoming Refill From [facility] via Fax    Medication Requested Buspirone 10 mg Tablets    Directions Take 1 tablet (10 mg) by mouth 2 times daily    Qty 60    Last Refill 12/13/2020 Qty 60 with no refills       Medication Refill Approved Per Refill Protocol

## 2021-01-15 ENCOUNTER — HEALTH MAINTENANCE LETTER (OUTPATIENT)
Age: 61
End: 2021-01-15

## 2021-01-22 ENCOUNTER — TELEPHONE (OUTPATIENT)
Dept: PSYCHIATRY | Facility: CLINIC | Age: 61
End: 2021-01-22

## 2021-01-22 ENCOUNTER — VIRTUAL VISIT (OUTPATIENT)
Dept: PSYCHIATRY | Facility: CLINIC | Age: 61
End: 2021-01-22
Attending: NURSE PRACTITIONER
Payer: COMMERCIAL

## 2021-01-22 DIAGNOSIS — F43.10 PTSD (POST-TRAUMATIC STRESS DISORDER): ICD-10-CM

## 2021-01-22 PROCEDURE — 99214 OFFICE O/P EST MOD 30 MIN: CPT | Mod: 95 | Performed by: NURSE PRACTITIONER

## 2021-01-22 RX ORDER — BUSPIRONE HYDROCHLORIDE 10 MG/1
10 TABLET ORAL 2 TIMES DAILY
Qty: 60 TABLET | Refills: 2 | Status: SHIPPED | OUTPATIENT
Start: 2021-01-22 | End: 2021-04-19

## 2021-01-22 NOTE — TELEPHONE ENCOUNTER
On January 22, 2021, at 8:17 AM, writer called patient at [phone] to confirm Virtual Visit. Writer unable to make contact with patient. Writer left detailed voice message for call back. [phone] left as call back number. Leyla Cortes, [facility]
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
## 2021-03-27 ENCOUNTER — APPOINTMENT (OUTPATIENT)
Dept: CT IMAGING | Facility: CLINIC | Age: 61
End: 2021-03-27
Attending: EMERGENCY MEDICINE
Payer: COMMERCIAL

## 2021-03-27 ENCOUNTER — HOSPITAL ENCOUNTER (EMERGENCY)
Facility: CLINIC | Age: 61
Discharge: HOME | End: 2021-03-27
Attending: EMERGENCY MEDICINE | Admitting: EMERGENCY MEDICINE
Payer: COMMERCIAL

## 2021-03-27 VITALS
BODY MASS INDEX: 23.05 KG/M2 | TEMPERATURE: 97.8 F | DIASTOLIC BLOOD PRESSURE: 79 MMHG | OXYGEN SATURATION: 100 % | HEIGHT: 64 IN | WEIGHT: 135 LBS | HEART RATE: 111 BPM | RESPIRATION RATE: 22 BRPM | SYSTOLIC BLOOD PRESSURE: 108 MMHG

## 2021-03-27 DIAGNOSIS — R06.02 SHORTNESS OF BREATH: ICD-10-CM

## 2021-03-27 LAB
ANION GAP SERPL CALCULATED.3IONS-SCNC: 7 MMOL/L (ref 3–14)
BASOPHILS # BLD AUTO: 0 10E9/L (ref 0–0.2)
BASOPHILS NFR BLD AUTO: 0.3 %
BUN SERPL-MCNC: 17 MG/DL (ref 7–30)
CALCIUM SERPL-MCNC: 9 MG/DL (ref 8.5–10.1)
CHLORIDE SERPL-SCNC: 108 MMOL/L (ref 94–109)
CO2 SERPL-SCNC: 23 MMOL/L (ref 20–32)
CREAT SERPL-MCNC: 0.85 MG/DL (ref 0.52–1.04)
D DIMER PPP FEU-MCNC: 7.6 UG/ML FEU (ref 0–0.5)
DIFFERENTIAL METHOD BLD: ABNORMAL
EOSINOPHIL # BLD AUTO: 0 10E9/L (ref 0–0.7)
EOSINOPHIL NFR BLD AUTO: 0.2 %
ERYTHROCYTE [DISTWIDTH] IN BLOOD BY AUTOMATED COUNT: 13 % (ref 10–15)
FLUAV RNA RESP QL NAA+PROBE: NEGATIVE
FLUBV RNA RESP QL NAA+PROBE: NEGATIVE
GFR SERPL CREATININE-BSD FRML MDRD: 73 ML/MIN/{1.73_M2}
GLUCOSE SERPL-MCNC: 116 MG/DL (ref 70–99)
HCT VFR BLD AUTO: 38.1 % (ref 35–47)
HGB BLD-MCNC: 12.8 G/DL (ref 11.7–15.7)
IMM GRANULOCYTES # BLD: 0 10E9/L (ref 0–0.4)
IMM GRANULOCYTES NFR BLD: 0.2 %
INTERPRETATION ECG - MUSE: NORMAL
LABORATORY COMMENT REPORT: NORMAL
LYMPHOCYTES # BLD AUTO: 0.7 10E9/L (ref 0.8–5.3)
LYMPHOCYTES NFR BLD AUTO: 6.3 %
MCH RBC QN AUTO: 29 PG (ref 26.5–33)
MCHC RBC AUTO-ENTMCNC: 33.6 G/DL (ref 31.5–36.5)
MCV RBC AUTO: 86 FL (ref 78–100)
MONOCYTES # BLD AUTO: 0.2 10E9/L (ref 0–1.3)
MONOCYTES NFR BLD AUTO: 2.1 %
NEUTROPHILS # BLD AUTO: 10 10E9/L (ref 1.6–8.3)
NEUTROPHILS NFR BLD AUTO: 90.9 %
NRBC # BLD AUTO: 0 10*3/UL
NRBC BLD AUTO-RTO: 0 /100
PLATELET # BLD AUTO: 194 10E9/L (ref 150–450)
POTASSIUM SERPL-SCNC: 3.7 MMOL/L (ref 3.4–5.3)
RBC # BLD AUTO: 4.41 10E12/L (ref 3.8–5.2)
RSV RNA SPEC QL NAA+PROBE: NEGATIVE
SARS-COV-2 RNA RESP QL NAA+PROBE: NEGATIVE
SODIUM SERPL-SCNC: 138 MMOL/L (ref 133–144)
SPECIMEN SOURCE: NORMAL
TROPONIN I SERPL-MCNC: <0.015 UG/L (ref 0–0.04)
WBC # BLD AUTO: 11 10E9/L (ref 4–11)

## 2021-03-27 PROCEDURE — 80048 BASIC METABOLIC PNL TOTAL CA: CPT | Performed by: EMERGENCY MEDICINE

## 2021-03-27 PROCEDURE — 250N000009 HC RX 250: Performed by: EMERGENCY MEDICINE

## 2021-03-27 PROCEDURE — C9803 HOPD COVID-19 SPEC COLLECT: HCPCS

## 2021-03-27 PROCEDURE — 85379 FIBRIN DEGRADATION QUANT: CPT | Performed by: EMERGENCY MEDICINE

## 2021-03-27 PROCEDURE — 94640 AIRWAY INHALATION TREATMENT: CPT

## 2021-03-27 PROCEDURE — 99285 EMERGENCY DEPT VISIT HI MDM: CPT | Mod: 25

## 2021-03-27 PROCEDURE — 93005 ELECTROCARDIOGRAM TRACING: CPT

## 2021-03-27 PROCEDURE — 71275 CT ANGIOGRAPHY CHEST: CPT

## 2021-03-27 PROCEDURE — 84484 ASSAY OF TROPONIN QUANT: CPT | Performed by: EMERGENCY MEDICINE

## 2021-03-27 PROCEDURE — 96374 THER/PROPH/DIAG INJ IV PUSH: CPT | Mod: 59

## 2021-03-27 PROCEDURE — 87636 SARSCOV2 & INF A&B AMP PRB: CPT | Performed by: EMERGENCY MEDICINE

## 2021-03-27 PROCEDURE — 85025 COMPLETE CBC W/AUTO DIFF WBC: CPT | Performed by: EMERGENCY MEDICINE

## 2021-03-27 PROCEDURE — 250N000011 HC RX IP 250 OP 636: Performed by: EMERGENCY MEDICINE

## 2021-03-27 RX ORDER — METHYLPREDNISOLONE SODIUM SUCCINATE 125 MG/2ML
125 INJECTION, POWDER, LYOPHILIZED, FOR SOLUTION INTRAMUSCULAR; INTRAVENOUS ONCE
Status: COMPLETED | OUTPATIENT
Start: 2021-03-27 | End: 2021-03-27

## 2021-03-27 RX ORDER — PREDNISONE 20 MG/1
TABLET ORAL
Qty: 8 TABLET | Refills: 0 | Status: SHIPPED | OUTPATIENT
Start: 2021-03-28 | End: 2021-11-05

## 2021-03-27 RX ORDER — IPRATROPIUM BROMIDE AND ALBUTEROL SULFATE 2.5; .5 MG/3ML; MG/3ML
3 SOLUTION RESPIRATORY (INHALATION)
Status: COMPLETED | OUTPATIENT
Start: 2021-03-27 | End: 2021-03-27

## 2021-03-27 RX ORDER — IOPAMIDOL 755 MG/ML
57 INJECTION, SOLUTION INTRAVASCULAR ONCE
Status: COMPLETED | OUTPATIENT
Start: 2021-03-27 | End: 2021-03-27

## 2021-03-27 RX ORDER — ALBUTEROL SULFATE 90 UG/1
2 AEROSOL, METERED RESPIRATORY (INHALATION) EVERY 6 HOURS PRN
Qty: 8 G | Refills: 0 | Status: SHIPPED | OUTPATIENT
Start: 2021-03-27

## 2021-03-27 RX ADMIN — METHYLPREDNISOLONE SODIUM SUCCINATE 125 MG: 125 INJECTION, POWDER, FOR SOLUTION INTRAMUSCULAR; INTRAVENOUS at 13:25

## 2021-03-27 RX ADMIN — IPRATROPIUM BROMIDE AND ALBUTEROL SULFATE 3 ML: .5; 3 SOLUTION RESPIRATORY (INHALATION) at 13:38

## 2021-03-27 RX ADMIN — IPRATROPIUM BROMIDE AND ALBUTEROL SULFATE 3 ML: .5; 3 SOLUTION RESPIRATORY (INHALATION) at 13:27

## 2021-03-27 RX ADMIN — SODIUM CHLORIDE 60 ML: 9 INJECTION, SOLUTION INTRAVENOUS at 15:44

## 2021-03-27 RX ADMIN — IPRATROPIUM BROMIDE AND ALBUTEROL SULFATE 3 ML: .5; 3 SOLUTION RESPIRATORY (INHALATION) at 13:39

## 2021-03-27 RX ADMIN — IOPAMIDOL 57 ML: 755 INJECTION, SOLUTION INTRAVENOUS at 15:44

## 2021-03-27 ASSESSMENT — MIFFLIN-ST. JEOR: SCORE: 1162.36

## 2021-03-27 ASSESSMENT — ENCOUNTER SYMPTOMS
DIZZINESS: 1
SHORTNESS OF BREATH: 1
FEVER: 0
ARTHRALGIAS: 1
VOMITING: 0
CHILLS: 1
PALPITATIONS: 0
FATIGUE: 1
CHEST TIGHTNESS: 1
NAUSEA: 0
COUGH: 1

## 2021-03-27 NOTE — ED TRIAGE NOTES
PT called 911 from home - received 2nd covid shot yesterday - c/o overall body aches and tiredness- today sudden onset of shortness of breath . Pt has hx of anxiety and panic attacks.  in the room .

## 2021-03-27 NOTE — ED NOTES
"[facility]  ED Nurse Handoff Report    ED Chief complaint: Shortness of Breath and Panic Attack      ED Diagnosis:   Final diagnoses:   None       Code Status: to be discussed with hopspitalist     Allergies:   Allergies   Allergen Reactions     Keflex [Cephalexin] Hives     Penicillins      Sulfa Drugs        Patient Story: pt received 2nd  covid vaccination yesterday -  Stated she had \" all the side effects. \" IE dizziness,body aches, fatigue, short of breath . History of panic attacks .   Focused Assessment:  Alert anxious, pt was hyperventilating on arrival but was able to calm her down and help her to breath slower and she stated she felt much better.    Treatments and/or interventions provided: Patient's response to treatments and/or interventions: imaging, nebs, steroid.    To be done/followed up on inpatient unit:  monitor     Does this patient have any cognitive concerns?: Short term memory loss and none     Activity level - Baseline/Home:  Independent  Activity Level - Current:   Independent    Patient's Preferred language: English   Needed?: No    Isolation: None  Infection: Not Applicable  Patient tested for COVID 19 prior to admission: YES  Bariatric?: No    Vital Signs:   Vitals:    03/27/21 1238 03/27/21 1300   BP: 120/57 (!) 153/96   Pulse: 99 92   Resp: 22    Temp: 97.8  F (36.6  C)    TempSrc: Oral    SpO2: 99% 99%   Weight: 61.2 kg (135 lb)    Height: 1.626 m (5' 4\")        Cardiac Rhythm:     Was the PSS-3 completed:   Yes  What interventions are required if any?               Family Comments:  bedside and updated.   OBS brochure/video discussed/provided to patient/family: N/A              Name of person given brochure if not patient: na              Relationship to patient: na    For the majority of the shift this patient's behavior was Green.   Behavioral interventions performed were calm approach. .    ED NURSE PHONE NUMBER: [phone]       "

## 2021-03-27 NOTE — ED PROVIDER NOTES
"  History   Chief Complaint:  Shortness of Breath and Panic Attack     HPI   Preethi Mays is a 60 year old female with history of asthma following menopause who presents with shortness of breath.  Yesterday, the patient received a second Covid shot.  During the nighttime, she developed generalized body aches and chills.  Today had sudden onset of shortness of breath.  She states that she is having trouble \"regulating\" her breathing, and has been feels that it is similar to a prior episode when she had aspiration.  She describes a tightness in her chest, and the sensation is worse when she tries talking. She has not tried albuterol inhaler at home, she has not had her prescription filled for that.  She denies any fever, cough, leg swelling, or chest pain.    Review of Systems   Constitutional: Positive for chills and fatigue. Negative for fever.   Respiratory: Positive for cough, chest tightness and shortness of breath.    Cardiovascular: Negative for chest pain, palpitations and leg swelling.   Gastrointestinal: Negative for nausea and vomiting.   Musculoskeletal: Positive for arthralgias.   Skin: Negative for rash.   Neurological: Positive for dizziness. Negative for syncope.   All other systems reviewed and are negative.    Allergies:  Keflex  Penicillins   Sulfa    Medications:  Aspirin   Buspar  Felxeril   Periostat  Norco  Maxalt   Senokot-Pericolace  Ultram   Cholecalciferol   Nolvadex    Past Medical History:    Blepharitis  Depression   PTSD  Rosacea  TMJ  Osteopenia   Basal cell carcinoma     Past Surgical History:    Lumpectomy  Colonoscopy  Hemorrhoid removal x 2  ORIF     Family History:    CAD  Breast cancer  CAD    Social History:  Lives with   Non smoker.  No illicit drug use.    Physical Exam     Patient Vitals for the past 24 hrs:   BP Temp Temp src Pulse Resp SpO2 Height Weight   03/27/21 1300 (!) 153/96 -- -- 92 -- 99 % -- --   03/27/21 1238 120/57 97.8  F (36.6  C) Oral 99 22 99 % " "1.626 m (5' 4\") 61.2 kg (135 lb)       Physical Exam    Gen: Alert, anxious.    Eye:   Pupils are equal, round, and reactive.     Sclera non-injected.    ENT:   Moist mucus membranes.     Normal tongue.    Oropharynx without lesions.    Cardiac:     Normal rate and regular rhythm.    No murmurs, gallops, or rubs.    Pulmonary:     Clear to auscultation bilaterally.    No wheezes, rales, or rhonchi.     Increased work of breathing, with intermittent gasping breaths.    Musculoskeletal:     Normal movement of all extremities.    Extremities:    No edema.  No calf pain.    Skin:   Warm and dry.    Neurologic:    Non-focal exam without asymmetric weakness or numbness.    Normal tone    Psychiatric:     Normal affect with appropriate interaction with examiner.    Emergency Department Course   ECG  ECG taken at 1435, ECG read at 1445  Normal sinus rhythm. Normal ECG.   Rate 97 bpm. RI interval 202 ms. QRS duration 74 ms. QT/QTc 344/436 ms. P-R-T axes 63 8 31.     Imaging:    CT Chest Pulmonary Embolism w contrast:     CT Chest Pulmonary Embolism w Contrast   Preliminary Result   IMPRESSION:   1.  No evidence for pulmonary embolism.   2.  No acute abnormality in the chest. No cause for shortness of   breath is identified.          Laboratory:     CBC: WBC 11.0, HGB 12.8,     BMP: Glucose 116(H), o/w WNL (Creatinine: 0.85)    Troponin (Collected 1301): <0.015     D Dimer - 7.6(H)    Asymptomatic COVID19 Virus PCR by nasopharyngeal swab pending     Emergency Department Course:    Reviewed:  I reviewed nursing notes, vitals, past medical history and care everywhere    Assessments:  1237 I obtained history and examined the patient as noted above.   1427 I rechecked the patient and explained findings.   1600 patient ambulated around the room, oxygen saturation after ambulating was 98%.  She did have some gait instability which is chronic related to chronic vertigo.    Interventions:  1325 Solu-Medrol 125 mg " IV  1327 Ipratropium Duoneb 3 mL x 3      Disposition:  The patient was discharged to home.       Impression & Plan     Medical Decision Making:  Preethi Mays is a 60 year old female with history of asthma following menopause who presents with shortness of breath.  Here in the ED, she presented with increased work of breathing, irregular respirations, and complaints of chest tightness.  She feels much better following the above interventions.  Oxygen saturations have remained normal.  The patient symptoms do not appear to be related to heart failure or pneumonia.  COVID-19 testing is pending, but CT of the chest does not demonstrate any findings consistent with COVID-19 groundglass opacities.  She also does not have pulmonary embolism.  Lung exam is not classic for asthma exacerbation.  Nevertheless, given improvement with nebs and steroids, I plan to continue steroids and as needed inhaler at home.  COVID-19 is less likely given that she received her second dose of the vaccine yesterday however still a possibility.  She will return to the ED for increased shortness of breath, fever, change in cough, or for any other concerns.    Covid-19  Preethi Mays was evaluated during a global COVID-19 pandemic, which necessitated consideration that the patient might be at risk for infection with the SARS-CoV-2 virus that causes COVID-19.   Applicable protocols for evaluation were followed during the patient's care.   COVID-19 was considered as part of the patient's evaluation. The plan for testing is:  a test was obtained during this visit.     Diagnosis:    ICD-10-CM    1. Shortness of breath  R06.02 Symptomatic Influenza A/B & SARS-CoV2 (COVID-19) Virus PCR Multiplex       Discharge Medications:  Discharge Medication List as of 3/27/2021  4:46 PM      START taking these medications    Details   albuterol (PROAIR HFA/PROVENTIL HFA/VENTOLIN HFA) 108 (90 Base) MCG/ACT inhaler Inhale 2 puffs into the lungs every 6  hours as needed for shortness of breath / dyspnea or wheezing, Disp-8 g, R-0, E-Prescribe      predniSONE (DELTASONE) 20 MG tablet Take two tablets (= 40mg) each day for 4 (four) days, Disp-8 tablet, R-0, E-Prescribe             Scribe Disclosure:  I, Christopher Sanfordkirk, am serving as a scribe at 12:37 PM on 3/27/2021 to document services personally performed by Jen Morillo MD, based on my observations and the provider's statements to me.            Jen Morillo MD  03/28/21 2021

## 2021-03-27 NOTE — ED NOTES
Bed: ED19  Expected date:   Expected time:   Means of arrival:   Comments:  518  60 F diff breathing started 30 min ago/stable/2nd covid vaccine yesterday  1228

## 2021-04-19 ENCOUNTER — VIRTUAL VISIT (OUTPATIENT)
Dept: PSYCHIATRY | Facility: CLINIC | Age: 61
End: 2021-04-19
Attending: NURSE PRACTITIONER
Payer: COMMERCIAL

## 2021-04-19 DIAGNOSIS — F43.10 PTSD (POST-TRAUMATIC STRESS DISORDER): ICD-10-CM

## 2021-04-19 PROCEDURE — 99214 OFFICE O/P EST MOD 30 MIN: CPT | Mod: 95 | Performed by: NURSE PRACTITIONER

## 2021-04-19 RX ORDER — BUSPIRONE HYDROCHLORIDE 10 MG/1
10 TABLET ORAL 2 TIMES DAILY
Qty: 60 TABLET | Refills: 3 | Status: SHIPPED | OUTPATIENT
Start: 2021-04-19 | End: 2021-08-04

## 2021-04-19 ASSESSMENT — PAIN SCALES - GENERAL: PAINLEVEL: NO PAIN (0)

## 2021-04-19 NOTE — PATIENT INSTRUCTIONS
**For crisis resources, please see the information at the end of this document**     Patient Education      Thank you for coming to the [facility] MENTAL HEALTH & ADDI[facility] [place] [facility].    Lab Testing:  If you had lab testing today and your results are reassuring or normal they will be mailed to you or sent through [employer] within 7 days. If the lab tests need quick action we will call you with the results. The phone number we will call with results is # [phone] (home) . If this is not the best number please call our clinic and change the number.    Medication Refills:  If you need any refills please call your pharmacy and they will contact us. Our fax number for refills is [phone]. Please allow three business for refill processing. If you need to  your refill at a new pharmacy, please contact the new pharmacy directly. The new pharmacy will help you get your medications transferred.     Scheduling:  If you have any concerns about today's visit or wish to schedule another appointment please call our office during normal business hours [phone] (8-5:00 M-F)    Contact Us:  Please call [phone] during business hours (8-5:00 M-F).  If after clinic hours, or on the weekend, please call  [phone].    Financial Assistance [phone]  [facility] Billing [phone]  Central Billing Office, [facility]: [phone]  [facility] Billing [phone]  Medical Records [phone]  [facility] Patient Bill of Rights [email or web address]       MENTAL HEALTH CRISIS NUMBERS:  For a medical emergency please call  911 or go to the nearest ER.     [facility]:   [facility] -[phone]   Crisis Residence [facility] -[phone]   Walk-In Counseling Center [facility] -[phone]   COPE 24/7 [place] Mobile Team -[phone] (adults)/[phone] (child)  CHILD: [facility] needs assessment  team - [phone]      [facility]:   [facility] - [phone]   Walk-in counseling [facility] - [phone]   Walk-in counseling [facility] - [phone]   Crisis Residence [facility] - [phone]  Urgent Care Adult Mental [facility]-[phone] mobile unit/ 24/7 crisis line    National Crisis Numbers:   National Suicide Prevention Lifeline: 9-341-298-TALK (887-313-7330)  Poison Control Center - 3-243-004-6043  [employer]/resources for a list of additional resources (SOS)  Trans Lifeline a hotline for transgender people 1-389.440.8429  The [employer] Project a hotline for LGBT youth 5-502-493-0383  Crisis Text Line: For any crisis 24/7   To: 186369  see www.crisistextline.org  - IF MAKING A CALL FEELS TOO HARD, send a text!         Again thank you for choosing [facility] M[facility] HEALTH [facility] and please let us know how we can best partner with you to improve you and your family's health.    You may be receiving a survey regarding this appointment. We would love to have your feedback, both positive and negative. The survey is done by an external company, so your answers are anonymous.
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
## 2021-06-23 ENCOUNTER — TRANSFERRED RECORDS (OUTPATIENT)
Dept: HEALTH INFORMATION MANAGEMENT | Facility: CLINIC | Age: 61
End: 2021-06-23

## 2021-06-25 ASSESSMENT — SLEEP AND FATIGUE QUESTIONNAIRES
HOW LIKELY ARE YOU TO NOD OFF OR FALL ASLEEP WHILE LYING DOWN TO REST IN THE AFTERNOON WHEN CIRCUMSTANCES PERMIT: HIGH CHANCE OF DOZING
HOW LIKELY ARE YOU TO NOD OFF OR FALL ASLEEP WHILE SITTING AND READING: SLIGHT CHANCE OF DOZING
HOW LIKELY ARE YOU TO NOD OFF OR FALL ASLEEP WHILE WATCHING TV: SLIGHT CHANCE OF DOZING
HOW LIKELY ARE YOU TO NOD OFF OR FALL ASLEEP IN A CAR, WHILE STOPPED FOR A FEW MINUTES IN TRAFFIC: WOULD NEVER DOZE
HOW LIKELY ARE YOU TO NOD OFF OR FALL ASLEEP WHILE SITTING QUIETLY AFTER LUNCH WITHOUT ALCOHOL: SLIGHT CHANCE OF DOZING
HOW LIKELY ARE YOU TO NOD OFF OR FALL ASLEEP WHEN YOU ARE A PASSENGER IN A CAR FOR AN HOUR WITHOUT A BREAK: SLIGHT CHANCE OF DOZING
HOW LIKELY ARE YOU TO NOD OFF OR FALL ASLEEP WHILE SITTING AND TALKING TO SOMEONE: WOULD NEVER DOZE
HOW LIKELY ARE YOU TO NOD OFF OR FALL ASLEEP WHILE SITTING INACTIVE IN A PUBLIC PLACE: WOULD NEVER DOZE

## 2021-06-25 ASSESSMENT — ENCOUNTER SYMPTOMS
DEPRESSION: 0
COUGH: 0
DYSPNEA ON EXERTION: 1
SHORTNESS OF BREATH: 0
NERVOUS/ANXIOUS: 1
WHEEZING: 0
SPUTUM PRODUCTION: 0
COUGH DISTURBING SLEEP: 0
PANIC: 0
INSOMNIA: 1
HEMOPTYSIS: 0
POSTURAL DYSPNEA: 0
DECREASED CONCENTRATION: 0
SNORES LOUDLY: 0

## 2021-06-28 ENCOUNTER — VIRTUAL VISIT (OUTPATIENT)
Dept: SLEEP MEDICINE | Facility: CLINIC | Age: 61
End: 2021-06-28
Payer: COMMERCIAL

## 2021-06-28 DIAGNOSIS — Z72.821 INADEQUATE SLEEP HYGIENE: ICD-10-CM

## 2021-06-28 DIAGNOSIS — F51.04 PSYCHOPHYSIOLOGICAL INSOMNIA: Primary | ICD-10-CM

## 2021-06-28 PROBLEM — Z17.0 MALIGNANT NEOPLASM OF OVERLAPPING SITES OF RIGHT BREAST IN FEMALE, ESTROGEN RECEPTOR POSITIVE (H): Status: ACTIVE | Noted: 2019-06-19

## 2021-06-28 PROBLEM — F43.10 POSTTRAUMATIC STRESS DISORDER: Status: ACTIVE | Noted: 2021-06-28

## 2021-06-28 PROBLEM — S62.109A WRIST FRACTURE: Status: RESOLVED | Noted: 2018-10-13 | Resolved: 2021-06-28

## 2021-06-28 PROBLEM — H81.10 BPPV (BENIGN PAROXYSMAL POSITIONAL VERTIGO): Status: ACTIVE | Noted: 2021-06-28

## 2021-06-28 PROBLEM — S82.891A CLOSED FRACTURE OF MALLEOLUS OF RIGHT ANKLE: Status: RESOLVED | Noted: 2018-10-13 | Resolved: 2021-06-28

## 2021-06-28 PROBLEM — G43.009 MIGRAINE WITHOUT AURA AND WITHOUT STATUS MIGRAINOSUS, NOT INTRACTABLE: Status: ACTIVE | Noted: 2017-01-25

## 2021-06-28 PROBLEM — S03.00XA TMJ (DISLOCATION OF TEMPOROMANDIBULAR JOINT): Status: ACTIVE | Noted: 2021-06-28

## 2021-06-28 PROBLEM — M85.89 OSTEOPENIA OF MULTIPLE SITES: Status: ACTIVE | Noted: 2018-11-01

## 2021-06-28 PROBLEM — C44.91 BCC (BASAL CELL CARCINOMA): Status: ACTIVE | Noted: 2021-06-28

## 2021-06-28 PROBLEM — G47.00 INSOMNIA: Status: ACTIVE | Noted: 2021-06-28

## 2021-06-28 PROBLEM — L71.9 ROSACEA: Status: ACTIVE | Noted: 2018-10-13

## 2021-06-28 PROBLEM — C50.811 MALIGNANT NEOPLASM OF OVERLAPPING SITES OF RIGHT BREAST IN FEMALE, ESTROGEN RECEPTOR POSITIVE (H): Status: ACTIVE | Noted: 2019-06-19

## 2021-06-28 PROBLEM — S62.102A FRACTURE OF WRIST, LEFT, CLOSED, INITIAL ENCOUNTER: Status: RESOLVED | Noted: 2018-10-13 | Resolved: 2021-06-28

## 2021-06-28 PROCEDURE — 99204 OFFICE O/P NEW MOD 45 MIN: CPT | Mod: 95 | Performed by: PSYCHIATRY & NEUROLOGY

## 2021-06-28 RX ORDER — KETOCONAZOLE 20 MG/G
CREAM TOPICAL
COMMUNITY
Start: 2020-12-18

## 2021-06-28 RX ORDER — FOLIC ACID, PYRIDOXINE HYDROCHLORIDE, CYANOCOBALAMIN, OMEGA-3 FATTY ACIDS, DOCONEXENT, ICOSAPENT AND .BETA.-SITOSTEROL 200; 500; 350; 1; 35; 500; 12.5 MG/1; UG/1; MG/1; MG/1; MG/1; MG/1; MG/1
CAPSULE, COATED ORAL
COMMUNITY
Start: 2021-03-01

## 2021-06-28 RX ORDER — TAMOXIFEN CITRATE 10 MG/1
TABLET ORAL
COMMUNITY
Start: 2021-06-06 | End: 2021-12-14

## 2021-06-28 RX ORDER — FLUOROMETHOLONE 0.1 %
SUSPENSION, DROPS(FINAL DOSAGE FORM)(ML) OPHTHALMIC (EYE)
COMMUNITY
Start: 2021-05-02

## 2021-06-28 NOTE — PROGRESS NOTES
Whit is a 61 year old who is being evaluated via a billable video visit.      How would you like to obtain your AVS? MyChart  If the video visit is dropped, the invitation should be resent by: mobile  Will anyone else be joining your video visit? No    Video Start Time: 1002    Video-Visit Details    Type of service:  Video Visit    Video End Time:1045    Originating Location (pt. Location): Home    Distant Location (provider location):  [facility]     Platform used for Video Visit: [facility]    Sleep Staff Addendum Video    I have seen and evaluated the patient today with the sleep fellow and agree with the documentation.  I supervised the fellow during the visit today.      In addition to direct face-to-face time I also personally spent additionally time in chart work preparation and also after the visit in documentation, placing orders and coordination of care.      Total time (face-to-face time + chart work time) spent in the care of the patient today was 50 minutes.     Cal Leyva MD

## 2021-06-28 NOTE — PROGRESS NOTES
"  [facility]  Referral for: sleep maintenance insomnia  Patient Name: Preethi Mays  MRN: [id number]  : 1960  Date of Clinic Visit: 2021  Primary Care Provider: Octaviano Hathaway  Referring Provider: No ref. provider found  Visit type: Video via AmWell; Visit start: 10AM; Visit end: 10:45AM  -------------------------------------------------------------------------------------------------------------------------------  CHIEF COMPLAINT: \"I don't sleep well, and haven't slept without a lot of assistance\"    HISTORY OF PRESENT ILLNESS:3  Preethi Mays is a 61 year old female w/ PMH of migraine without aura, basal cell carcinoma, breast cancer, PTSD, anxiety,  presenting for evaluation of sleep maintenance insomnia. \"I've done everything sleep hygiene-wise that I can do on my own and I'm out of tools.\"    Sleep scores  ESS: 7  ALLISON: 21  STOP-BAN (snores, age)    Sleep-Wake schedule  Bedtime: 10:30-10:45PM  Sleep onset: within minutes, rarely takes longer as with major life changes  Nocturnal awakenings: She has kept a sleep log for the month of , and in that month she had 6 days without nocturnal awakenings, usually has 1 prolonged awakening that is difficult to fall back asleep   -if it's not too early in the AM she'll take 1/3 of a Unisom so she can get a little bit of sleep, but if it's too late in the AM she won't be able to fall back asleep. During this time she will stay in bed trying to return to sleep. She will try not to look at the clock. Sometimes her mind is too active to fall back asleep.   -this has been occurring since her PTSD onset about 10 years ago, with some fluctuating patterns   -worse with Tamoxifen which she's been on for 1 year 8 months, before this could fall back asleep in 1-2 hours but now it takes longer and sometimes isn't able to fall back asleep at all   -1 good night (can fall back asleep easily) to every 4 bad nights (hours or can't fall " "asleep again)  Wake time: 6:30AM and she uses an alarm  Sleep inertia: Minimal unless she takes the Unisom later in the sleep period  Naps: No, unless she feels \"like I can't cope with the day anymore\" or can't think clearly  Preferred sleep position: Lateral, variable  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, online full-time  Shift work?: morning classes M-Th 9:15-12:45 and evening classes M+W 6-8:45PM    Sleep-related breathing  Snoring: Yes, mildly  Apneas: No  Waking up snoring/choking/gasping: No  Morning HAs: No unless it's a migraine  Dry mouth: Yes, also due to Tamoxifen and Unisom  Mouth breather?: No    Sleep-related behaviors  Restless legs: No  Active sleeper?: Fairly active  Dream enactment: No  Sleep walking: No  Nightmares/Night terrors: No  Sleep paralysis: No  Confusional arousals: No    Sleep hygiene  Sleep partners:  + no pets  Caffeine: doesn't drink past 12PM  EtOH: doesn't drink most nights, if she does she won't drink past 7PM  Sleep-aids: Unisom when she can't fall back asleep, Melatonin 2.5mg nightly  Screen use: wears blue light blockers when watching TV in the evening; has a TV in the bedroom but reports not using it    Family history  Pertinent positives: Daughter has sleep apnea secondary to eating disorder, Niece has hypersomnia  Pertinent negatives: Insomnia, RLS    ASSESSMENT AND PLAN:  Preethi Mays is a 61 year old female w/ PMH of migraine without aura, basal cell carcinoma, breast cancer, PTSD, anxiety,  presenting for evaluation of sleep maintenance insomnia.    # Sleep maintenance insomnia  # Inadequate sleep hygiene    Her insomnia has been present for around a decade, unclear trigger but perhaps related to traumatic event. It's unclear what is causing her awakenings, no obvious cause like respiratory or behavioral events. Low suspicion for sleep apnea given only mild snoring, STOP-BANG is 2 suggesting low risk. Despite a dx of PTSD, she doesn't endorse nightmares " that awaken her. She does have some good sleep hygiene with avoiding caffeine and bright lights, but she does have poor stimulus control that might be contributing to her insomnia. She is also experiencing some side-effects from Tamoxifen, so if we can't get better control over her sleep consolidation through behavioral means alone then we should consider adding an agent like Doxepin to her regimen until she stops taking the Tamoxifen (probably on it for another 3 years or so at this point).   -ok to continue Melatonin 2.5mg nightly and Unisom PRN, goal is to reduce Unisom use as sleep maintenance gets easier   -CBT-I referral   -briefly counseled and provided educational materials on ways to improve sleep hygiene including stimulus control and sleep restriction   -follow-up 3 months   -consider Doxepin if no improvement after CBT-I   -never drive if tired or sleepy    The plan was seen and evaluated with Dr. Leyva.    Helio Moya MD  Sleep Medicine Fellow  [facility]    Sleep Staff Addendum Video    I have seen and evaluated the patient today with the sleep fellow and agree with the documentation.  I supervised the fellow during the visit today.      In addition to direct face-to-face time I also personally spent additionally time in chart work preparation and also after the visit in documentation, placing orders and coordination of care.      Total time (face-to-face time + chart work time) spent in the care of the patient today was 50 minutes.     Cal Leyva MD      -------------------------------------------------------------------------------------------------------------------------------  PHYSICAL EXAMINATION:  Vitals: no vitals were taken for this virtual visit  There is no height or weight on file to calculate BMI.  General: NAD  Neuro: alert; language is fluent with intact comprehension, no facial asymmetry or ptosis, normal speech, no abnormal movements  Psych:  cooperative, appropriate mood and affect    INVESTIGATIONS:  Prior PSG: None    Ferritin = no data  CO2 = 23-25    ECHO: no data    REVIEW OF SYSTEMS: 12-point RoS negative except as per HPI.  -------------------------------------------------------------------------------------------------------------------------------  Allergies   Allergen Reactions     Keflex [Cephalexin] Hives     Penicillins      Sulfa Drugs      Current Outpatient Medications   Medication Sig Dispense Refill     [id number] 3-Phytosterols (BP VIT 3) 1 MG CAPS One capsule BID       fluorometholone (FML LIQUIFILM) 0.1 % ophthalmic suspension        ketoconazole (NIZORAL) 2 % external cream APPLY EXTERNALLY TO FEET TWICE DAILY       albuterol (PROAIR HFA/PROVENTIL HFA/VENTOLIN HFA) 108 (90 Base) MCG/ACT inhaler Inhale 2 puffs into the lungs every 6 hours as needed for shortness of breath / dyspnea or wheezing 8 g 0     aspirin 325 MG tablet Take 1 tablet (325 mg) by mouth daily 42 tablet 0     busPIRone (BUSPAR) 10 MG tablet Take 1 tablet (10 mg) by mouth 2 times daily 60 tablet 3     cyclobenzaprine (FLEXERIL) 5 MG tablet Take 5 mg by mouth daily as needed for muscle spasms       doxycycline (PERIOSTAT) 20 MG tablet Take 20 mg by mouth 2 times daily       fish oil-omega-3 fatty acids 1000 MG capsule Take 1 g by mouth daily       Melatonin 2.5 MG CAPS Take 1 tablet by mouth nightly as needed       metroNIDAZOLE (NORITATE) 1 % cream Apply topically daily       order for DME Equipment being ordered: Walker Wheels (), Walker () and Walker Platform ()  Treatment Diagnosis: Impaired gait 1 each 0     predniSONE (DELTASONE) 20 MG tablet Take two tablets (= 40mg) each day for 4 (four) days 8 tablet 0     Rizatriptan Benzoate (MAXALT PO) Take 10 mg by mouth as needed for migraine       senna-docusate (SENOKOT-S;PERICOLACE) 8.6-50 MG per tablet Take 1 tablet by mouth 2 times daily as needed for constipation 100 tablet 0     sennosides  (SENOKOT) 8.6 MG tablet Take 2 tablets by mouth 2 times daily 120 tablet 1     tamoxifen (NOLVADEX) 10 MG tablet TAKE 1 TABLET BY MOUTH DAILY. INCREASE AS TOLERATED OR INSTRUCTED BY DOCTOR BLOOM       traMADol (ULTRAM) 50 MG tablet Take 1 tablet (50 mg) by mouth every 6 hours as needed for moderate pain or severe pain 20 tablet 0     Vitamin D, Cholecalciferol, 1000 units CAPS Take 2 tablets by mouth daily 100 capsule 3     Past Medical History:   Diagnosis Date     Blepharitis      Depression      PTSD (post-traumatic stress disorder)      Rosacea      TMJ (dislocation of temporomandibular joint)      Past Surgical History:   Procedure Laterality Date     BREAST SURGERY      R) lumpectomy inclucing lymph nodes     COLONOSCOPY       COLONOSCOPY N/A 6/16/2020    Procedure: COLONOSCOPY;  Surgeon: Natalya Juarez MD;  Location:  GI     GI SURGERY      hemerrhoid surgery x2     OPEN REDUCTION INTERNAL FIXATION WRIST Left 10/15/2018    Procedure: OPEN REDUCTION INTERNAL FIXATION LEFT DISTAL RADIUS;  Surgeon: Hesham Whelan MD;  Location:  OR     Family History   Problem Relation Age of Onset     Coronary Artery Disease Mother      Breast Cancer Mother      Coronary Artery Disease Father      This note was written with the assistance of the Dragon voice-dictation technology software. The final document, although reviewed, may contain errors. For corrections, please contact the office.  -------------------------------------------------------------------------------------------------------------------------------

## 2021-06-28 NOTE — PATIENT INSTRUCTIONS
"SLEEP HYGIENE  - do not use electronic devices with bright screens (phones, TVs, computers) within 1 hour of bedtime; if possible, use a \"night mode\" on your devices  - do not exercise within 6 hours before bedtime  - create a good sleep environment: do not eat or watch tv in bed, make your bedroom as dark and quiet as possible  - keep bed partners consistent and as few as possible; try to avoid children and pets sleeping in the bed with you  - do not to eat a large meal before bed; a light snack is ok  - no caffeine within 6 hours of bedtime (coffee, tea, soda, energy drinks, chocolate, some pain relievers)  - no more than 3 caffeinated beverages per day  - no alcohol within 4-6 hours of bedtime  - no nicotine before bedtime    Resources:  https://aasm.org/resources/pdf/products/izzytoshaleyepbetter_web.pdf  "

## 2021-08-04 ENCOUNTER — TELEPHONE (OUTPATIENT)
Dept: PSYCHIATRY | Facility: CLINIC | Age: 61
End: 2021-08-04

## 2021-08-04 ENCOUNTER — VIRTUAL VISIT (OUTPATIENT)
Dept: PSYCHIATRY | Facility: CLINIC | Age: 61
End: 2021-08-04
Attending: NURSE PRACTITIONER
Payer: COMMERCIAL

## 2021-08-04 DIAGNOSIS — F43.10 PTSD (POST-TRAUMATIC STRESS DISORDER): ICD-10-CM

## 2021-08-04 PROCEDURE — 99214 OFFICE O/P EST MOD 30 MIN: CPT | Mod: 95 | Performed by: NURSE PRACTITIONER

## 2021-08-04 RX ORDER — BUSPIRONE HYDROCHLORIDE 10 MG/1
10 TABLET ORAL 2 TIMES DAILY
Qty: 60 TABLET | Refills: 2 | Status: SHIPPED | OUTPATIENT
Start: 2021-08-04 | End: 2021-11-05
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
## 2021-08-04 NOTE — TELEPHONE ENCOUNTER
On August 4, 2021, at 12:43 PM, writer called patient at [phone] to confirm Virtual Visit. Writer unable to make contact with patient. Writer left detailed voice message for call back. [phone] left as call back number. Leyla Cortes, [facility]

## 2021-08-13 ENCOUNTER — MYC MEDICAL ADVICE (OUTPATIENT)
Dept: SLEEP MEDICINE | Facility: CLINIC | Age: 61
End: 2021-08-13

## 2021-08-13 NOTE — TELEPHONE ENCOUNTER
Called and spoke to pateint regarding her very concerning Roxannet message.     Emphasized the critial need to search out emergency mental health care when in a crises.  She indicates she understands and has family members she can count on.     Discussed principles of CBT-I and apologized for the delay in seeing Dr Iyer.     I still believe this is the best therapy for her and want her to see Dr Iyer but also advised she consider an online CBT I program in the meanwhile.     She will give it a try.     Discussed the critically important issue of never driving if tired or sleepy and she indicated she understood.

## 2021-08-17 VITALS — HEIGHT: 64 IN | BODY MASS INDEX: 23.17 KG/M2

## 2021-08-17 RX ORDER — BECLOMETHASONE DIPROPIONATE HFA 40 UG/1
1 AEROSOL, METERED RESPIRATORY (INHALATION) 2 TIMES DAILY
COMMUNITY
Start: 2021-07-28 | End: 2021-11-05

## 2021-08-17 RX ORDER — MULTIPLE VITAMINS W/ MINERALS TAB 9MG-400MCG
1 TAB ORAL DAILY
COMMUNITY

## 2021-08-17 NOTE — PROGRESS NOTES
Preethi Mays is a 61 year old who is being evaluated via a billable video visit.      How would you like to obtain your AVS? MyChart  If the video visit is dropped, the invitation should be resent by: Send to e-mail at: [email or web address]  Will anyone else be joining your video visit? No    Does patient have any form of state insurance?No   Do you have wifi? Yes  Do you have a smart phone/device?Yes  Can you download an adal on your phone comfortably with out assistance including You Tube? Yes            Ravindra White MA    Video Start Time: 9:42 AM  Video-Visit Details    Type of service:  Video Visit    Video End Time:9:48 AM    Originating Location (pt. Location): Home    Distant Location (provider location):  @[email or web address]@     Platform used for Video Visit: [facility]     SLEEP MEDICINE CONSULTATION  Sleep Psychology    Name: Preethi Myas MRN# [id number]   Age: 61 year old YOB: 1960     Date of Consultation: Aug 19, 2021  Consultation is requested by: No referring provider defined for this encounter.  Primary care provider: Octaviano Hathaway    Reason for Sleep Consultation     Preethi Mays is a 61 year old female seen today for a behavioral sleep medicine consultation because of insomnia    Assessment and Plan     Sleep Diagnoses/Recommendations:    Chronic insomnia    Preethi Mays was seen for behavioral medicine consultation and consideration for cognitive behavioral therapy for insomnia.  Insomnia is multi factored, associated with psychophysiologic features, history of PTSD and inadequate stimulus control.  Patient has high degree of insight and seems motivated for continued behavioral sleep training.  The major intervention will be to compress sleep schedule to 7-hour sleep window of 11 PM-6 AM.  She will continue her efforts at stimulus control.  Second major target for behavioral treatment is maladaptive worry and beliefs about sleep, particularly fear of  "loss of control of sleep.  We introduced a cognitive restructuring module to help patient begin to identify and modify these thoughts with the goal of reducing the overall level of arousal.    Summary Counseling:      Preethi was provided information about the pathophysiology of insomnia, psychophysiological factors contributing to the onset and maintenance of insomnia and how co-occurring medical conditions and intrinsic sleep disorders can affect sleep.  Treatment options were discussed including component of cognitive-behavioral therapy for insomnia as applicable to patient specific sleep concerns and symptoms. The benefits and potential early side effects of treatment including increased daytime sleepiness were discussed.     Patient was advised to consult with their prescribing provider around use of or changes to prescription sleep medication.  Patient was counseled on the importance of avoiding driving if drowsy.    See patient instructions for initial treatment recommendations and behavioral sleep plan.    Follow-up: 6 weeks     History of Present Sleep Complaint     Preethi Mays is a 61 year old year old female with a relevant medical history of  Breast cancer, PTSD who presents with a 10 year history of insomnia associated with PTSD and trauma-related symptoms.  Sleep worsened two years ago with diagnosis of breast cancer and treatment with Temoxefin.    Patient was seen by Dr. Raymon Leyva for sleep medicine consultation and was considered to be at low pretest risk for LALITHA.  She is not reporting RLS symptoms.    She reports she takes over the counter Unisom.  Usually takes 25 mg.a sees it as a safety behavior. \"It makes me feel in control.\"      Patient works as a teacher teaching English to immigrants.  She also is a .    She is uncertain that the bedroom is associated with triggering trauma symptoms.    Patient does report sleep specific worry. She reports significant sleep " "specific effort.  She fears losing control of sleep and the perceived consequences of insomnia.    She reports she began using a sleep tracking adal which triggered anxiety about insomnia.    Patient reports a history of tinnitus triggered when her son was in a bus accident.    She reports increased frustration when having difficulty returning back to sleep.  She indicates she would \"be stubborn\" and stay in bed and make herself fall back to sleep.  More recently she has been getting out of bed and reading or knitting.    Sleep/Wake Pattern:    Bedtime: 10:30-10:45PM  Sleep onset: within minutes, rarely takes longer as with major life changes  Nocturnal awakenings: She has kept a sleep log for the month of June, and in that month she had 6 days without nocturnal awakenings, usually has 1 prolonged awakening that is difficult to fall back asleep               -if it's not too early in the AM she'll take 1/3 of a Unisom so she can get a little bit of sleep, but if it's too late in the AM she won't be able to fall back asleep. During this time she will stay in bed trying to return to sleep. She will try not to look at the clock. Sometimes her mind is too active to fall back asleep.               -this has been occurring since her PTSD onset about 10 years ago, with some fluctuating patterns               -worse with Tamoxifen which she's been on for 1 year 8 months, before this could fall back asleep in 1-2 hours but now it takes longer and sometimes isn't able to fall back asleep at all               -1 good night (can fall back asleep easily) to every 4 bad nights (hours or can't fall asleep again)  Wake time: 6:30AM and she uses an alarm  Sleep inertia: Minimal unless she takes the Unisom later in the sleep period  Naps: No, unless she feels \"like I can't cope with the day anymore\" or can't think clearly  Preferred sleep position: Lateral, variable  Employment: 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  No sleep study indicated    Screening     EPWORTH SLEEPINESS SCALE    Sitting and reading 0   Watching TV 0   Sitting, inactive in a public place (theatre or [employer] 0    As a passenger in a car 2   Lying down to rest in the afternoon when circumstance permit 3   Sitting and talking to someone 0   Sitting quietly after lunch without alcohol 0   In a car, while stopped for a few minutes in traffic 0   TOTAL SCORE (nl <11) 5     INSOMNIA SEVERITY INDEX     Difficulty falling asleep 2   Difficult staying asleep 4   Problems waking up to early 2   How SATISFIED/DISSATISFIED are you with your CURRENT sleep pattern? 4   How NOTICEABLE to others do you think your sleep pattern is in terms of your quality of life? 3   How WORRIED/DISTRESSED are you about your current sleep pattern? 3   To what extent do you consider your sleep problem to INTERFERE with your daily fuctioning(e.g. daytime fatigue, mood, ability to function at work/daily chores, concentration, mood,etc.) CURRENTLY? 4   INSOMNIA SEVERITY INDEX TOTAL SCORE 22   Absence of insomnia (0-7); sub-threshold insomnia (8-14); moderate insomnia (15-21); and severe insomnia (22-28)      STOP-Bang LALITHA Risk Score: 2/8  Low Risk:  0-2  Intermediate Risk:  3-4  High Risk:  5-8      Vitals     Ht 1.626 m (5' 4\")   BMI 23.17 kg/m       Medical History     Patient Active Problem List   Diagnosis     Rosacea     Migraine without aura and without status migrainosus, " not intractable     BCC (basal cell carcinoma)     BPPV (benign paroxysmal positional vertigo)     Psychophysiological insomnia     Malignant neoplasm of overlapping sites of right breast in female, estrogen receptor positive (H)     Elevated cholesterol     TMJ (dislocation of temporomandibular joint)     Posttraumatic stress disorder     Osteopenia of multiple sites     Inadequate sleep hygiene        Current Outpatient Medications   Medication Sig Dispense Refill     albuterol (PROAIR HFA/PROVENTIL HFA/VENTOLIN HFA) 108 (90 Base) MCG/ACT inhaler Inhale 2 puffs into the lungs every 6 hours as needed for shortness of breath / dyspnea or wheezing 8 g 0     busPIRone (BUSPAR) 10 MG tablet Take 1 tablet (10 mg) by mouth 2 times daily 60 tablet 2     cyclobenzaprine (FLEXERIL) 5 MG tablet Take 5 mg by mouth daily as needed for muscle spasms       doxycycline (PERIOSTAT) 20 MG tablet Take 20 mg by mouth 2 times daily       fish oil-omega-3 fatty acids 1000 MG capsule Take 1 g by mouth daily       fluorometholone (FML LIQUIFILM) 0.1 % ophthalmic suspension        ketoconazole (NIZORAL) 2 % external cream APPLY EXTERNALLY TO FEET TWICE DAILY       Melatonin 2.5 MG CAPS Take 1 tablet by mouth nightly as needed       metroNIDAZOLE (NORITATE) 1 % cream Apply topically daily       multivitamin w/minerals (MULTI-VITAMIN) tablet Take 1 tablet by mouth daily       QVAR REDIHALER 40 MCG/ACT inhaler Inhale 1 puff into the lungs 2 times daily       Rizatriptan Benzoate (MAXALT PO) Take 10 mg by mouth as needed for migraine       tamoxifen (NOLVADEX) 10 MG tablet TAKE 1 TABLET BY MOUTH DAILY. INCREASE AS TOLERATED OR INSTRUCTED BY DOCTOR BLOOM       Vitamin D, Cholecalciferol, 1000 units CAPS Take 2 tablets by mouth daily 100 capsule 3     MB-E0-[id number]-Omega 3-Phytosterols (BP VIT 3) 1 MG CAPS One capsule BID (Patient not taking: Reported on 8/17/2021)       predniSONE (DELTASONE) 20 MG tablet Take two tablets (= 40mg) each day for 4  (four) days (Patient not taking: Reported on 8/17/2021) 8 tablet 0       Past Surgical History:   Procedure Laterality Date     BREAST SURGERY      R) lumpectomy inclucing lymph nodes     COLONOSCOPY       COLONOSCOPY N/A 6/16/2020    Procedure: COLONOSCOPY;  Surgeon: Natalya Juarez MD;  Location:  GI     GI SURGERY      hemerrhoid surgery x2     OPEN REDUCTION INTERNAL FIXATION WRIST Left 10/15/2018    Procedure: OPEN REDUCTION INTERNAL FIXATION LEFT DISTAL RADIUS;  Surgeon: Hesham Whelan MD;  Location:  OR          Allergies   Allergen Reactions     Keflex [Cephalexin] Hives     Penicillins      Smoke.      Asthma exacerbation     Sulfa Drugs        Mental Health History     Prior Mental Health Diagnosis: PTSD    Current Mental Health Treatment: primary care medication management, psychotherapy, buspirone    Prior Chemical Dependency Treatment:  None reported    Family History     Family History   Problem Relation Age of Onset     Coronary Artery Disease Mother      Breast Cancer Mother      Coronary Artery Disease Father        Family History of Sleep Disorders: None reported    Social History     Social History     Socioeconomic History     Marital status:      Spouse name: None     Number of children: None     Years of education: None     Highest education level: None   Occupational History     None   Tobacco Use     Smoking status: Never Smoker     Smokeless tobacco: Never Used   Substance and Sexual Activity     Alcohol use: Yes     Drug use: No     Sexual activity: None   Other Topics Concern     Parent/sibling w/ CABG, MI or angioplasty before 65F 55M? Not Asked   Social History Narrative     None     Social Determinants of Health     Financial Resource Strain:      Difficulty of Paying Living Expenses:    Food Insecurity:      Worried About Running Out of Food in the Last Year:      Ran Out of Food in the Last Year:    Transportation Needs:      Lack of Transportation (Medical):       Lack of Transportation (Non-Medical):    Physical Activity:      Days of Exercise per Week:      Minutes of Exercise per Session:    Stress:      Feeling of Stress :    Social Connections:      Frequency of Communication with Friends and Family:      Frequency of Social Gatherings with Friends and Family:      Attends Denominational Services:      Active Member of Clubs or Organizations:      Attends Club or Organization Meetings:      Marital Status:    Intimate Partner Violence:      Fear of Current or Ex-Partner:      Emotionally Abused:      Physically Abused:      Sexually Abused:        Writer and teacher     Mental Status Examination     Preethi presented as oriented X3 with speech and language intact.  The patient was cooperative throughout the evaluation with no signs of hallucinations, delusions, loosening of associations or other thought disturbance.  Mood was normal Affect was congruent with mood. Insight and judgement were intact.  Memory appeared intact for recent and remote elements.  There was no report of suicidal ideation, intention or plan. Attention and concentration were within normal.      Copy:   Octaviano Hathaway               No referring provider defined for this encounter.    Cal Iyer, Gunner, LP, DBSM  Diplomate, Behavioral Sleep Medicine  [facility]    Note: This dictation was created using voice recognition software. This document may contain an error not identified before finalizing the document. If the error changes the accuracy of the document, I would appreciate it being brought to my attention.

## 2021-08-19 ENCOUNTER — VIRTUAL VISIT (OUTPATIENT)
Dept: SLEEP MEDICINE | Facility: CLINIC | Age: 61
End: 2021-08-19
Payer: COMMERCIAL

## 2021-08-19 ENCOUNTER — TRANSCRIBE ORDERS (OUTPATIENT)
Dept: OTHER | Age: 61
End: 2021-08-19

## 2021-08-19 DIAGNOSIS — C50.919 BREAST CANCER (H): Primary | ICD-10-CM

## 2021-08-19 DIAGNOSIS — F51.04 CHRONIC INSOMNIA: Primary | ICD-10-CM

## 2021-08-19 PROCEDURE — 90791 PSYCH DIAGNOSTIC EVALUATION: CPT | Mod: 95 | Performed by: PSYCHOLOGIST

## 2021-08-19 NOTE — PATIENT INSTRUCTIONS
CBT-I Module - Sleep Consolidation Training      Now that you understand a bit more about how sleep works and what causes insomnia, you are ready to move on to the core of CBT-I called Sleep Consolidation Training. It will be important that you continue to keep track of your sleep using your CBTi  Melani or your paper sleep diary.  This process involves five core strategies that will:    ? Strengthen your sleep drive and biological sleep clock  ? Strengthen the link between your bed and sleep    Core Sleep Consolidation Strategies    Much like physical activity and eating nutritious foods strengthens our body, studies show that the following core strategies are the key to achieving stronger, healthier sleep.     1. Reduce your Time In Bed    Spending extra time in bed trying to get more sleep actually can cause more sleep disruption and weaken sleep efficiency. Sleep efficiency is simply the percentage of time a person spends asleep while in bed. Normal sleep efficiency is thought to be 85% or greater.  By reducing your time in bed, you will be awake longer leading to quicker and deeper sleep. This strategy does not reduce the amount of sleep you get, just the time you are awake in bed:                                                   During the first step of sleep consolidation training you will reduce your time in bed and maintain the following Sleep Schedule Prescription. Your prescription is determined by the average nightly amount of sleep you got over the past two weeks plus 30 minutes. It also takes into account the best time for you to sleep. The least amount of time prescribed is 6 hours in bed.      Your Sleep Schedule Prescription                    Total Time in Bed:  7 hours                  Bedtime:  No earlier than 11 PM                   Wake-up Time:  Out of bed every day by 6 AM     2.  Don't go to bed until you feel sleepy (not tired or fatigued)     This helps increase your sleep drive  by keeping you awake longer.  If you go to bed when you're not sleepy, it gives your brain the wrong message and can lead to frustration.     If you feel sleepy before your prescribed bedtime, find activities that can help you stay awake until it is time for you to go to bed. Even brief naps in the evening can be very disruptive of sleep at night.     3.  Don't stay in bed unless you are sleeping      If you are unable to fall asleep or return to sleep after about 30 minutes, get out of bed. This helps train your brain that the bed is for sleep. It is harmful to your sleep when you are worried or frustrated in bed. Do not read, eat, worry, think about sleep, use mobile phones or tablets, or watch TV in bed. Do not watch the clock during the night.     Go to another room and do something relaxing. Plan things you can do when you get out of bed. Avoid use of mobile devices or computers when out of bed.    Return to bed only when you feel sleepy again.  Repeat as often as needed throughout the night.     4.  Get out of bed at the same time every day    Getting up at the same time helps set your biological clock. It is important to stick to your wake time no matter how much sleep you got the night before or how you feel in the morning.    Varying your wake can have the same effect as jet lag.  It disrupts your biological clock and makes you feel more tired and exhausted.  Trying to  catch up  on sleep on the weekends only makes things worse and sets you up for a cycle of poor sleep the following weekdays.      Make sure you set an alarm and keep it away from the bed to prevent looking at the clock during the night.      5.  Avoid daytime napping    Avoid daytime napping if possible. Napping partially fulfills your need for sleep and weakens your sleep drive at night.    However, if you find yourself sleepy (not just tired) you can take a brief 15-30 minute nap during the day if needed.  Naps within 7-8 hours of your  prescribed wake time are less likely to affect your sleep the coming night.  Sleepy people make more mistakes and may hurt themselves or others. Therefore, safety trumps all other sleep prescriptions.  Never drive or operate equipment if drowsy or sleepy.     Changing Your Sleep Schedule Prescription    After one week, you can adjust your sleep schedule prescription based on how well you are sleeping. Use the following guidelines to change your prescribed time in bed based on information from your Sleep Diary, Mobile Sleep Diary Melani or Wearable Device:          Increase Time in Bed by 15 Minutes IF:    ? you estimate you are falling asleep in less than 30 minutes AND awake less than 30 minutes during the night.      CBT-I Supplementary Module - Cognitive Training    Developing Healthy Sleep Thoughts    Insomnia is often is triggered by stressful events such as a change in employment, a separation, medical illness, or loss.  How you handle your sleep problem, mainly your thoughts and behaviors, determines in large part whether your sleeplessness is short term or develops into chronic insomnia well after the stressful event is over.     This  part of your program involves learning a set of skills to change negative and worrisome thoughts about sleep.   Insomnia is more likely to persist if you interpret the onset as a threat or loss of control.  Worry, fears and untrue beliefs about insomnia can become a vicious cycle.  Negative sleep thoughts activate the physical and emotional systems of your body.  This strengthens wakefulness and weakens your sleep system.  This in turn produces increased stress and pressure to sleep.  We call this unhelpful sleep effort.     Changing How You Think About Insomnia    We now know that our thoughts and attitudes affect our stress response.  Negative sleep thoughts can worsen your insomnia. They can lead to greater fear or anxiety about sleep, which in turn can aggravate your sleep  problem. Thinking more positively and realistically about your sleep promotes its health and healing.     Myths about Sleep    ? People need 8 hours of sleep     This is untrue.  Sleep needs vary from person to person.  Most people need between 6-8 hours to feel alert during the day.  People who sleep 7 hours live longer than those who sleep 8 hours.  Research also suggests people who sleep 5 hours live longer than those who sleep 9 hours    ? Insomnia Causes Dementia        While there is lots of research happening looking into various causes of dementia, there is      currently no conclusive evidence that insomnia causes dementia.  We do know that the rate of       Insomnia is higher in a host of health conditions that have been associated with increased      risk of certain types of dementia.  In general, people with primary insomnia perform similar      normal sleepers in tests of neurocognitive ability.    ? Insomnia is the same as sleep deprivation    Sleep deprivation is lack of sleep due to not allowing enough time for sleep.  This is typically not true for insomnia where people have enough time for sleep and even extend their sleep time trying to get more sleep.  Most people with insomnia get about the same amount of sleep as normal sleepers.  The difference is that with insomnia the sleep is fragmented and less consolidated.       You are Getting More Sleep than You Think    Research using objective sleep tests reveal that people with insomnia get an average of one hour more sleep than they think. This is due in part because the brain misperceives Stage 1 and 2 sleep as being awake.  In addition, stress and arousal while awake in bed changes the brain's perception of time awake.    Examples of Unhealthy Sleep Thoughts    Negative sleep thoughts can have a profound impact on your ability to get a good night's sleep. Below are some examples of negative thoughts associated with insomnia that may sound  familiar to you:    ? I must get 8 hours of sleep to function during the day.  ? I won't get to sleep tonight.  ? Insomnia is going to cause health problems.  ? I am dreading going to bed.  ? I woke up early again.  I know I won't get back to sleep.  ? The reason I feel terrible today is because of my insomnia.  ? I've totally lost control of my sleep.  ? I can't sleep without a sleeping pill.    Negative thoughts usually occur automatically and feel like a knee-jerk reaction.  They are often untrue or distorted, especially late in the evening as you become increasingly tired and others are asleep.      Changing Unhealthy Sleep Thoughts    Now that you understand the impact that negative thoughts can have on your sleep, you are ready to change these unhelpful thoughts.  The strategy is powerful and simple:  By recognizing and replacing your negative thoughts about sleep with more accurate, positive thoughts, you will be less anxious and frustrated about your sleep. A more realistic and positive attitude about sleep will allow you to relax and sleep more easily through the night.           There are several important steps involved in changing your unhelpful and negative thoughts about sleep:            Examples of Healthy Sleep Thoughts    ? My work will not suffer much if I have a poor night's sleep.    ? I'm probably getting more sleep than I think.    ? Other things than my sleep affect my daytime functioning.    ? Because I didn't sleep well last night, I am more likely to sleep well tonight because of increased sleep drive that leads to deeper sleep.    ? Sleep requirements vary from one person to another.    ? If I don't sleep well, most of the time the worst that can happen is that my mood might not be as bright the next day.    ? If I awaken after about 5 hours of sleep, I have gotten the core sleep I need for the day.    ? I'm more likely to fall asleep the longer I've been awake    ? I'm more likely to  fall asleep as my body temperature begins to decrease through the night.    ? My body's wakefulness system takes charge during the day to promote daytime functioning.    ? These sleep skills have worked for others, and they can work for me.    Other Recommendations for Changing Beliefs and Attitudes   About Your Sleep    ? Keep Realistic Expectations     There is a widespread belief that 8 hours of sleep is necessary to feel refreshed and function well during the day. Many believe that good sleep means never waking up at night.  Others come to expect that they should always wake up in the morning feeling full of energy.  Concerns may arise when your actual sleep falls short of these expectations. Try to avoid placing undue pressure on yourself to achieve certain sleep levels.  It only increases anxiety about sleeping.  Focus on quality sleep not quantity of sleep.    ? Revise Your Thoughts about the Causes of Insomnia      There is a natural tendency to attribute our sleep problems completely to external factors such as a chemical imbalance, pain, aging or things over which we may have little control.  Although these factors may contribute to your insomnia, research shows CBT-I is beneficial even if they are present.    ? Don't Blame Insomnia for All Daytime Impairments      Many individuals blame insomnia for every symptom or concern they experience during the day from fatigue to lack of concentration. Though poor sleep may produce some of these symptoms, it us usually untrue that all daytime impairment is attributable to insomnia.  It is more often that other factors such as stress and co-occurring medical problems contribute more to how you feel during the day.      ? Don't Catastrophize      Catastrophic thinking means making a sleep mountain out of a molehill.  Some people believe poor sleep will have catastrophic consequences to their physical health, mental health and appearance. Others see insomnia as a  complete loss of control.  These perceived consequences of insomnia often prompt people to seek medication or other treatment.  Keep in mind insomnia can be very unpleasant but for the most part is not dangerous.    ? Don't Focus on Sleep     Some people reduce their activity level because of poor sleep.  Although sleep is a necessary part of life, don't make it the focus of your thoughts and concern. Trust that if you engage in health sleep habits, your body will give you the sleep it needs.  Make sure you continue your normal activities despite your insomnia.    ? Never Try to Sleep      Of all the habits the most important one is this:  Never try to force yourself to sleep.  Doing so usually backfires and makes things worse. Instead, focus on keeping to your prescribed sleep schedule, getting up at the same time every day and using the bed only for sleep.    What are Your Three Top Negative Thoughts About Your Sleep?    Negative Thought                        Resulting Feeling                   Alternate Thought  I must get 8 hours of sleep                   Fear                      People need differing amounts of sleep and   every night                                                                         sleep time varies somewhat each night    1.__________________________________________________________________________________________    2.__________________________________________________________________________________________    3.__________________________________________________________________________________________

## 2021-09-12 ENCOUNTER — PATIENT OUTREACH (OUTPATIENT)
Dept: ONCOLOGY | Facility: CLINIC | Age: 61
End: 2021-09-12

## 2021-09-12 NOTE — PROGRESS NOTES
New Patient Oncology Nurse Navigator Note     Referring provider: Self     Referring Clinic/Organization: Patient is following Dr. Stringer from practice at [facility]     Referred to (specialty): Medical oncology    Requested provider (if applicable): Dr. Terrance Stringer     Date Referral Received: 8/19/21     Evaluation for : Breast cancer     Clinical History (per Nurse review of records provided):    The patient presented for her annual screening mammogram on 6/10/2019.  Her breast tissue was noted to be heterogeneously dense.  There is a 2 cm spiculated mass in the right breast approximately 2.5 cm from the nipple.  There is also slight nipple retraction.  No suspicious findings seen in the left breast.  Diagnostic mammogram showed persistence of a 1.4 cm irregular mass near the 3 to 4 o'clock position 2.5 cm from the nipple.  Targeted ultrasound demonstrated a 1.5 cm x 1.4 cm x 0.8 cm solid hypoechoic mass at the 3 o'clock position, 1 cm from the nipple.  Ultrasound-guided biopsy was recommended and performed.    Final Diagnosis   A) RIGHT BREAST, 3:00, 1 CM FROM NIPPLE, ULTRASOUND-GUIDED CORE BIOPSY:   1. Invasive ductal carcinoma      a. Jermyn grade: II of III; Kaila score: 7 of 9      b. Angio-lymphatic invasion: Present      c. Associated DCIS: Present      d. Subtype: Solid        e. Grade of DCIS: 2 of 3   2. Breast Ancillary Testing:         a. Hormone Receptors:             Estrogen receptor: Positive (97%, strong staining)             Progesterone receptor: Positive (84%, moderate staining)       b. HER2 by IHC: Negative (1+ by manual morphometry)      Status post right breast lumpectomy and sentinel lymph node biopsy on 7/9/2019.  Final Diagnosis   A) RIGHT BREAST, WIRE-LOCALIZED LUMPECTOMY:   1. Invasive ductal carcinoma with focal single file formations, Jermyn grade III      a. Invasive tumor measures 1.7 cm      b. DCIS, cribriform type, nuclear grade 3      c. Invasive carcinoma  is located less than 0.1 cm from the closest superior and posterior margins (for final margins see specimens D-F)      d. Lymphovascular invasion      e. Previous core biopsy site changes   2. Breast Ancillary [id number]: Performed on prior case ([id number])       a. Hormone Receptors:             Estrogen receptor: Positive (97%, strong staining)             Progesterone receptor: Positive (84%, moderate staining)       b. HER2 by IHC: Negative (1+ by manual morphometry)          HER2 by FISH: Negative            HER2/CEP17 ratio: 1.26            HER2 signals/cell: 3.61            CEP17 signals/cell: 2.84     B) RIGHT AXILLARY SENTINEL LYMPH NODE #1, EXCISION:   1.  One lymph node negative for carcinoma     C) RIGHT AXILLARY SENTINEL LYMPH NODE #2, EXCISION:   1.  One lymph node negative for carcinoma     D) RIGHT BREAST, ANTERIOR INFERIOR SKIN MARGIN, RE-EXCISION:   1. Skin and subcutis negative for carcinoma     E) RIGHT BREAST, ANTERIOR SUPERIOR SKIN MARGIN, RE-EXCISION:   1. Skin and subcutis negative for carcinoma     F) RIGHT BREAST, SUPERIOR POSTERIOR MARGIN, RE-EXCISION:   1. Proliferative fibrocystic change   2. Focus of lymphovascular invasion   3. Negative for infiltrating carcinoma and DCIS     Genetic testing negative (VUS in POLE)  Completed postlumpectomy radiation therapy.    Initiated tamoxifen 10/28/2019.    Dr. Terrance Stringer was her medical oncologist.  Treatment records not available to me as of this writing.    Records Location (Care Everywhere, Media, etc.): Care Everywhere, Media     Records Needed: [facility]

## 2021-09-13 NOTE — PROGRESS NOTES
Whit is a 61 year old who is being evaluated via a billable video visit.      How would you like to obtain your AVS? MyChart  If the video visit is dropped, the invitation should be resent by: Text to cell phone: xxx  Will anyone else be joining your video visit? No    Video Start Time: 1:09 PM  Video-Visit Details    Type of service:  Video Visit    Video End Time:1:47 PM    Originating Location (pt. Location): Home    Distant Location (provider location):  [facility] SLEEP [facility]     Platform used for Video Visit: [facility]     SLEEP MEDICINE VIRTUAL VIDEO FOLLOW-UP VISIT  Sleep Psychology    Patient Name: Preethi Mays  MRN:  [id number]  Date of Service: Sep 14, 2021       Subjective Report     Preethi Mays  returns for a telehealth video visit to discuss progress in implementing behavioral strategies for the management of insomnia.  Patient consent for initiation of video visit was obtained and documented prior to initiation of visit.     Preethi reports overall she is doing well and sleep is improving.  She was also able to navigate.travel and a time zone change.    She reports that work stress and active mind continues to affect sleep somewhat.  However marked levels of anxiety have subsided.      She reports feeling less tired during the day.     She reports she has been  reframing her negative thoughts.  Bedtime around 11 PM and up for the day by 6 AM.    Estimating 6 hours of sleep.  Discussed compression of sleep protocol of reducing time in bed if WASO and SOL greater than 1 hour.      .     Sleep Data:     Source of Sleep Estimates:  verbal self-report        EPWORTH SLEEPINESS SCALE    Sitting and reading 0   Watching TV 0   Sitting, inactive in a public place (theatre or mtg.) 0    As a passenger in a car 2   Lying down to rest in the afternoon when circumstance permit 3   Sitting and talking to someone 0   Sitting quietly after lunch without alcohol 0   In a car, while stopped  for a few minutes in traffic 0   TOTAL SCORE (nl <11) 5     INSOMNIA SEVERITY INDEX     Difficulty falling asleep 1   Difficult staying asleep 3   Problems waking up to early 2   How SATISFIED/DISSATISFIED are you with your CURRENT sleep pattern? 2   How NOTICEABLE to others do you think your sleep pattern is in terms of your quality of life? 2   How WORRIED/DISTRESSED are you about your current sleep pattern? 2   To what extent do you consider your sleep problem to INTERFERE with your daily fuctioning(e.g. daytime fatigue, mood, ability to function at work/daily chores, concentration, mood,etc.) CURRENTLY? 2   INSOMNIA SEVERITY INDEX TOTAL SCORE 14    Absence of insomnia (0-7); sub-threshold insomnia (8-14); moderate insomnia (15-21); and severe insomnia (22-28)       Interventions     Strategies and recommendations including implementation of stimulus control and cognitive restructuring were discussed today.       Vital Signs     There were no vitals taken for this visit.     Mental Status     Orientation:  X3  Mood:  normal  Affect:  Congruent with mood  Speech/Language:  Normal  Thought Process: Intact  Associations:  Normal  Thought Content: Normal  Patient does not report any suicidal ideation, intention or plan.    Diagnostic Impressions and Plan     Chronic insomnia    Plan:  continue current sleep schedule and plan and patient can reduce TIB by 15 minutes in two weeks of sleep efficiency is subpar.  Conversely increase TIB by 15 minutes if SE greater than estimated 90%    Follow-up: 6 weeks      Cal Iyer PsyD, PENELOPE, [facility]  Diplomate, Behavioral Sleep Medicine  [facility]      Note: This dictation was created using voice recognition software. This document may contain an error not identified before finalizing the document. If the error changes the accuracy of the document, I would appreciate it being brought to my attention.

## 2021-09-14 ENCOUNTER — VIRTUAL VISIT (OUTPATIENT)
Dept: SLEEP MEDICINE | Facility: CLINIC | Age: 61
End: 2021-09-14
Payer: COMMERCIAL

## 2021-09-14 DIAGNOSIS — F51.04 CHRONIC INSOMNIA: Primary | ICD-10-CM

## 2021-09-14 PROCEDURE — 90834 PSYTX W PT 45 MINUTES: CPT | Mod: 95 | Performed by: PSYCHOLOGIST

## 2021-10-11 ENCOUNTER — PATIENT OUTREACH (OUTPATIENT)
Dept: ONCOLOGY | Facility: CLINIC | Age: 61
End: 2021-10-11

## 2021-10-12 NOTE — TELEPHONE ENCOUNTER
RECORDS STATUS - BREAST    RECORDS REQUESTED FROM: [facility]   DATE REQUESTED: 10/13/21   NOTES DETAILS STATUS   OFFICE NOTE from referring provider Sent for upload 10/13 Dr. Terrance Stringer   OFFICE NOTE from medical oncologist  Dr. Terrance Stringer   OFFICE NOTE from surgeon     OFFICE NOTE from radiation oncologist     DISCHARGE SUMMARY from hospital     DISCHARGE REPORT from the ER     OPERATIVE REPORT  07/09/19: Right Breast Lumpectomy and sentinel lymph node Bx   MEDICATION LIST [facility]    CLINICAL TRIAL TREATMENTS TO DATE     LABS     REQUEST BLOCKS FOR ALL BREAST CANCER PTS     PATHOLOGY REPORTS  (Tissue diagnosis, Stage, ER/MT percentage positive and intensity of staining, HER2 IHC, FISH, and all biopsies from breast and any distant metastasis)                 CE 07/09/19: [id number]  06/13/19: [id number]   GENONOMIC TESTING     TYPE:   (Next Generation Sequencing, including Foundation One testing, and Oncotype score)     IMAGING (NEED IMAGES & REPORT)     CT SCANS     NM PACS 07/09/19- NM Inj [place] node breast    XRAY PACS 07/09/19- XR Breast Specimen   MRI PACS 06/19/19- MR Breast   MAMMO PACS 06/11/21, 05/29/20, 07/09/19   ULTRASOUND PACS 07/09/19- US Breast  06/13/19- US Biopsy lymph node   PET     BONE SCAN     BRAIN MRI       Action October 12, 2021 2:15 PM ABT   Action Taken Called and spoke to patient about getting consent form signed for [facility]. Patient states they would like CASSIDY sent via email.     Email sent to [email or web address]    4:34 PM  CASSIDY received and sent request to [facility]     Action [facility]   Action Taken 10/13/21  Spoke margot Hansen @  [employer], he will resolve Mammograms.  7:57 AM       Action October 13, 2021 11:46 AM ABT   Action Taken Records received from [facility], sent for urgent upload

## 2021-10-13 NOTE — PROGRESS NOTES
Oncology/Hematology Visit Note  Oct 15, 2021    Reason for Visit: follow up of     ASSESSMENT:  1. Stage IA invasive ductal breast cancer, status post lumpectomy and radiotherapy, and currently on tamoxifen with continued adequate tolerance.  2. Now, over 2 years out from the time of diagnosis with no evidence of recurrent disease by history or physical exam.  3. However she does have significant pulmonary symptoms.  She did have a poorly differentiated cancer but it was estrogen receptor positive and had an appetite because current score of 20 implying no benefit from chemotherapy.  She is only been treated with adjuvant endocrine treatment.  There is some risk of metastatic disease to the lungs and I think would be very reasonable to go ahead and get a CT scan.  She has a history of no hypoxia and her exam is normal.  That said I think imaging is very important here.  This will likely turn out to be a primary pulmonary issue and that she has follow-up with a pulmonologist coming up.      PLAN:  1. Continue tamoxifen 20 mg p.o. daily  2. CT of the chest with IV contrast ASAP.  I will chart check the results and get back to her i with the results.  Hopefully the pulmonologist will need this by the time he sees her.  3. Return to clinic in 3 months to be seen by an RAMON and in 6 months to be seen by me    Terrance Stringer MD      History of Present Illness:     DIAGNOSIS:  1. Stage IA invasive ductal breast cancer, diagnosed definitively at lumpectomy and sentinel lymph node biopsy 7/9/2019.  Whit had a 1.7 cm poorly differentiated primary tumor.  It was 97% positive for the estrogen receptor with strong staining, 84% positive for the progesterone receptor with moderate staining, and negative for HER-2 amplification by IHC and FISH.  2 sentinel lymph nodes were negative for involvement.  Oncotype recurrence score was ordered and this was 20.    THERAPY TO DATE:  1. Lumpectomy and sentinel lymph node biopsy  7/9/2019  2. Adjuvant radiotherapy 9/4 through 10/1/2019  3. Tamoxifen since 10/28/2019    Interval History:    Whit is back with her  Kevan    She has had a tough go of it for the last 6 months.  She has had a lot of pulmonary issues.  She brings with her a typed up excelling in spreadsheet outlining her symptoms with the treatments that she has had.  It all started over 6 months ago when she presented with pleuritic chest pain to the emergency department in the got a CT pulmonary angiogram to rule out a pulmonary embolus.  It was a negative study.  Since that time she has had significant but intermittent problems.  Most recently she has been seen by her primary care physician and placed on Singulair, advair, albuterol, and prilosec.  She has an appointment with a pulmonologist in 2 weeks.  She wonders if it is from her tamoxifen (it is not).  She just wants to feel better.    She has no new lumps or bumps.    She has no pain except for the occasional pain in the sternal area with breathing.  There are some pain with coughing as well.    Social history:  Whit grew up in [place] and graduated from high school in 1977.  She then went to [employer] where she met her  Kevan.  They have 4 children, Nadia, Martin, Skylar, and Santo.  Kevan is a dentist.  I went to medical school with his brother, Brad.  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      Review of Systems:  Patient denies any of the following except if noted above: fevers, chills, difficulty with energy, vision or hearing changes, chest pain, dyspnea, abdominal pain, nausea, vomiting, diarrhea, constipation, urinary concerns, headaches, numbness, tingling, issues with sleep or mood. He/She also denies lumps, bumps, rashes or skin lesions, bleeding or bruising issues.    Current Outpatient Medications   Medication Sig Dispense Refill     albuterol (PROAIR HFA/PROVENTIL HFA/VENTOLIN HFA) 108 (90 Base)  MCG/ACT inhaler Inhale 2 puffs into the lungs every 6 hours as needed for shortness of breath / dyspnea or wheezing 8 g 0     busPIRone (BUSPAR) 10 MG tablet Take 1 tablet (10 mg) by mouth 2 times daily 60 tablet 2     cyclobenzaprine (FLEXERIL) 5 MG tablet Take 5 mg by mouth daily as needed for muscle spasms       doxycycline (PERIOSTAT) 20 MG tablet Take 20 mg by mouth 2 times daily       VL-A2-I66-Omega 3-Phytosterols (BP VIT 3) 1 MG CAPS One capsule BID (Patient not taking: Reported on 8/17/2021)       fish oil-omega-3 fatty acids 1000 MG capsule Take 1 g by mouth daily       fluorometholone (FML LIQUIFILM) 0.1 % ophthalmic suspension        ketoconazole (NIZORAL) 2 % external cream APPLY EXTERNALLY TO FEET TWICE DAILY       Melatonin 2.5 MG CAPS Take 1 tablet by mouth nightly as needed       metroNIDAZOLE (NORITATE) 1 % cream Apply topically daily       multivitamin w/minerals (MULTI-VITAMIN) tablet Take 1 tablet by mouth daily       predniSONE (DELTASONE) 20 MG tablet Take two tablets (= 40mg) each day for 4 (four) days (Patient not taking: Reported on 8/17/2021) 8 tablet 0     QVAR REDIHALER 40 MCG/ACT inhaler Inhale 1 puff into the lungs 2 times daily       Rizatriptan Benzoate (MAXALT PO) Take 10 mg by mouth as needed for migraine       tamoxifen (NOLVADEX) 10 MG tablet TAKE 1 TABLET BY MOUTH DAILY. INCREASE AS TOLERATED OR INSTRUCTED BY DOCTOR BLOOM       Vitamin D, Cholecalciferol, 1000 units CAPS Take 2 tablets by mouth daily 100 capsule 3       Physical Examination:  General: The patient is a pleasant female in no acute distress.  She does appear healthy.  She does cough frequently throughout the visit.  There were no vitals taken for this visit.  Wt Readings from Last 10 Encounters:   03/27/21 61.2 kg (135 lb)   06/16/20 61.2 kg (135 lb)   10/15/18 67.1 kg (148 lb)   10/13/18 68.9 kg (151 lb 14.4 oz)     Head: No concerning lesions  OP: Covered by a mask  Neck: Supple, FROM  CV: S1 S2 RRR  Lungs: No  dullness to percussion, CTA bilaterally.  There is no wheezes, crackles, or other concerning findings on this lung exam.  Abdomen: Bowel sounds present, soft, nontender with no palpable hepatosplenomegaly or masses.   Extremities: No lower extremity edema noted bilaterally.   Lymphatic: No cervical or supraclavicular adenopathyNeuro: Cranial nerves II through XII are grossly intact.  Skin: No rashes, petechiae, or bruising noted on exposed skin.  Her skin is warm.  Neuro: CN 2-12 grossly intact.    Laboratory Data:  Most Recent 3 CBC's:  Recent Labs   Lab Test 03/27/21  1301 10/14/18  0609   WBC 11.0 8.6   HGB 12.8 11.1*   MCV 86 87    318    Most Recent 3 BMP's:  Recent Labs   Lab Test 03/27/21  1301 10/14/18  0609    139   POTASSIUM 3.7 4.1   CHLORIDE 108 107   CO2 23 25   BUN 17 18   CR 0.85 0.74   ANIONGAP 7 7   RACHEL 9.0 8.1*   * 91    Most Recent 2 LFT's:No lab results found. Most Recent TSH and T4:No lab results found.  I reviewed the above labs today.    Imaging:      I reviewed the above imaging today.      Terrance Stringer MD, Msc  [facility]  [address] [place] [address] [phone]    30 minutes spent on the date of the encounter doing chart review, review of outside records, review of test results, interpretation of tests and documentation

## 2021-10-15 ENCOUNTER — ONCOLOGY VISIT (OUTPATIENT)
Dept: ONCOLOGY | Facility: CLINIC | Age: 61
End: 2021-10-15
Attending: INTERNAL MEDICINE
Payer: COMMERCIAL

## 2021-10-15 ENCOUNTER — PRE VISIT (OUTPATIENT)
Dept: ONCOLOGY | Facility: CLINIC | Age: 61
End: 2021-10-15

## 2021-10-15 VITALS
HEIGHT: 63 IN | OXYGEN SATURATION: 98 % | SYSTOLIC BLOOD PRESSURE: 131 MMHG | WEIGHT: 144.4 LBS | HEART RATE: 91 BPM | DIASTOLIC BLOOD PRESSURE: 87 MMHG | TEMPERATURE: 98.2 F | BODY MASS INDEX: 25.59 KG/M2

## 2021-10-15 DIAGNOSIS — R06.02 SHORTNESS OF BREATH: Primary | ICD-10-CM

## 2021-10-15 PROCEDURE — 999N000109 HC STATISTIC OP CR VISIT

## 2021-10-15 PROCEDURE — 99214 OFFICE O/P EST MOD 30 MIN: CPT | Performed by: INTERNAL MEDICINE

## 2021-10-15 RX ORDER — PREDNISOLONE 15 MG/5 ML
1 SOLUTION, ORAL ORAL DAILY
COMMUNITY
End: 2021-11-05

## 2021-10-15 RX ORDER — MONTELUKAST SODIUM 10 MG/1
10 TABLET ORAL
COMMUNITY
Start: 2021-10-13 | End: 2022-10-07

## 2021-10-15 ASSESSMENT — MIFFLIN-ST. JEOR: SCORE: 1190.24

## 2021-10-15 NOTE — LETTER
10/15/2021         RE: Preethi Mays  [address] [facility] [address]        Dear Colleague,    Thank you for referring your patient, Preethi Mays, to the [facility]. Please see a copy of my visit note below.    Oncology/Hematology Visit Note  Oct 15, 2021    Reason for Visit: follow up of     ASSESSMENT:  1. Stage IA invasive ductal breast cancer, status post lumpectomy and radiotherapy, and currently on tamoxifen with continued adequate tolerance.  2. Now, over 2 years out from the time of diagnosis with no evidence of recurrent disease by history or physical exam.  3. However she does have significant pulmonary symptoms.  She did have a poorly differentiated cancer but it was estrogen receptor positive and had an appetite because current score of 20 implying no benefit from chemotherapy.  She is only been treated with adjuvant endocrine treatment.  There is some risk of metastatic disease to the lungs and I think would be very reasonable to go ahead and get a CT scan.  She has a history of no hypoxia and her exam is normal.  That said I think imaging is very important here.  This will likely turn out to be a primary pulmonary issue and that she has follow-up with a pulmonologist coming up.      PLAN:  1. Continue tamoxifen 20 mg p.o. daily  2. CT of the chest with IV contrast ASAP.  I will chart check the results and get back to her i with the results.  Hopefully the pulmonologist will need this by the time he sees her.  3. Return to clinic in 3 months to be seen by an RAMON and in 6 months to be seen by me    Terrance Stringer MD      History of Present Illness:     DIAGNOSIS:  1. Stage IA invasive ductal breast cancer, diagnosed definitively at lumpectomy and sentinel lymph node biopsy 7/9/2019.  Whit had a 1.7 cm poorly differentiated primary tumor.  It was 97% positive for the estrogen receptor with strong staining, 84% positive for the progesterone  receptor with moderate staining, and negative for HER-2 amplification by IHC and FISH.  2 sentinel lymph nodes were negative for involvement.  Oncotype recurrence score was ordered and this was 20.    THERAPY TO DATE:  1. Lumpectomy and sentinel lymph node biopsy 7/9/2019  2. Adjuvant radiotherapy 9/4 through 10/1/2019  3. Tamoxifen since 10/28/2019    Interval History:    Whit is back with her  Kevan    She has had a tough go of it for the last 6 months.  She has had a lot of pulmonary issues.  She brings with her a typed up excelling in spreadsheet outlining her symptoms with the treatments that she has had.  It all started over 6 months ago when she presented with pleuritic chest pain to the emergency department in the got a CT pulmonary angiogram to rule out a pulmonary embolus.  It was a negative study.  Since that time she has had significant but intermittent problems.  Most recently she has been seen by her primary care physician and placed on Singulair, advair, albuterol, and prilosec.  She has an appointment with a pulmonologist in 2 weeks.  She wonders if it is from her tamoxifen (it is not).  She just wants to feel better.    She has no new lumps or bumps.    She has no pain except for the occasional pain in the sternal area with breathing.  There are some pain with coughing as well.    Social history:  Whit grew up in [place] and graduated from high school in 1977.  She then went to [employer] where she met her  Kevan.  They have 4 children, Nadia, Martin, Skylar, and Santo.  Kevan is a dentist.  I went to medical school with his brother, Brad.  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      Review of Systems:  Patient denies any of the following except if noted above: fevers, chills, difficulty with energy, vision or hearing changes, chest pain, dyspnea, abdominal pain, nausea, vomiting, diarrhea, constipation, urinary concerns, headaches,  numbness, tingling, issues with sleep or mood. He/She also denies lumps, bumps, rashes or skin lesions, bleeding or bruising issues.    Current Outpatient Medications   Medication Sig Dispense Refill     albuterol (PROAIR HFA/PROVENTIL HFA/VENTOLIN HFA) 108 (90 Base) MCG/ACT inhaler Inhale 2 puffs into the lungs every 6 hours as needed for shortness of breath / dyspnea or wheezing 8 g 0     busPIRone (BUSPAR) 10 MG tablet Take 1 tablet (10 mg) by mouth 2 times daily 60 tablet 2     cyclobenzaprine (FLEXERIL) 5 MG tablet Take 5 mg by mouth daily as needed for muscle spasms       doxycycline (PERIOSTAT) 20 MG tablet Take 20 mg by mouth 2 times daily       HA-E0-Z49-Omega 3-Phytosterols (BP VIT 3) 1 MG CAPS One capsule BID (Patient not taking: Reported on 8/17/2021)       fish oil-omega-3 fatty acids 1000 MG capsule Take 1 g by mouth daily       fluorometholone (FML LIQUIFILM) 0.1 % ophthalmic suspension        ketoconazole (NIZORAL) 2 % external cream APPLY EXTERNALLY TO FEET TWICE DAILY       Melatonin 2.5 MG CAPS Take 1 tablet by mouth nightly as needed       metroNIDAZOLE (NORITATE) 1 % cream Apply topically daily       multivitamin w/minerals (MULTI-VITAMIN) tablet Take 1 tablet by mouth daily       predniSONE (DELTASONE) 20 MG tablet Take two tablets (= 40mg) each day for 4 (four) days (Patient not taking: Reported on 8/17/2021) 8 tablet 0     QVAR REDIHALER 40 MCG/ACT inhaler Inhale 1 puff into the lungs 2 times daily       Rizatriptan Benzoate (MAXALT PO) Take 10 mg by mouth as needed for migraine       tamoxifen (NOLVADEX) 10 MG tablet TAKE 1 TABLET BY MOUTH DAILY. INCREASE AS TOLERATED OR INSTRUCTED BY DOCTOR BLOOM       Vitamin D, Cholecalciferol, 1000 units CAPS Take 2 tablets by mouth daily 100 capsule 3       Physical Examination:  General: The patient is a pleasant female in no acute distress.  She does appear healthy.  She does cough frequently throughout the visit.  There were no vitals taken for this  visit.  Wt Readings from Last 10 Encounters:   03/27/21 61.2 kg (135 lb)   06/16/20 61.2 kg (135 lb)   10/15/18 67.1 kg (148 lb)   10/13/18 68.9 kg (151 lb 14.4 oz)     Head: No concerning lesions  OP: Covered by a mask  Neck: Supple, FROM  CV: S1 S2 RRR  Lungs: No dullness to percussion, CTA bilaterally.  There is no wheezes, crackles, or other concerning findings on this lung exam.  Abdomen: Bowel sounds present, soft, nontender with no palpable hepatosplenomegaly or masses.   Extremities: No lower extremity edema noted bilaterally.   Lymphatic: No cervical or supraclavicular adenopathyNeuro: Cranial nerves II through XII are grossly intact.  Skin: No rashes, petechiae, or bruising noted on exposed skin.  Her skin is warm.  Neuro: CN 2-12 grossly intact.    Laboratory Data:  Most Recent 3 CBC's:  Recent Labs   Lab Test 03/27/21  1301 10/14/18  0609   WBC 11.0 8.6   HGB 12.8 11.1*   MCV 86 87    318    Most Recent 3 BMP's:  Recent Labs   Lab Test 03/27/21  1301 10/14/18  0609    139   POTASSIUM 3.7 4.1   CHLORIDE 108 107   CO2 23 25   BUN 17 18   CR 0.85 0.74   ANIONGAP 7 7   RACHEL 9.0 8.1*   * 91    Most Recent 2 LFT's:No lab results found. Most Recent TSH and T4:No lab results found.  I reviewed the above labs today.    Imaging:      I reviewed the above imaging today.      Terrance Stringer MD, Msc  [facility]  [address] [place] [id number] [phone]    30 minutes spent on the date of the encounter doing chart review, review of outside records, review of test results, interpretation of tests and documentation       Again, thank you for allowing me to participate in the care of your patient.        Sincerely,        Terrance Stringer MD

## 2021-10-24 ENCOUNTER — HEALTH MAINTENANCE LETTER (OUTPATIENT)
Age: 61
End: 2021-10-24

## 2021-10-25 ENCOUNTER — HOSPITAL ENCOUNTER (OUTPATIENT)
Dept: CT IMAGING | Facility: CLINIC | Age: 61
Discharge: HOME | End: 2021-10-25
Attending: INTERNAL MEDICINE | Admitting: INTERNAL MEDICINE
Payer: COMMERCIAL

## 2021-10-25 DIAGNOSIS — R06.02 SHORTNESS OF BREATH: ICD-10-CM

## 2021-10-25 PROCEDURE — 250N000009 HC RX 250: Performed by: INTERNAL MEDICINE

## 2021-10-25 PROCEDURE — 71275 CT ANGIOGRAPHY CHEST: CPT

## 2021-10-25 PROCEDURE — 250N000011 HC RX IP 250 OP 636: Performed by: INTERNAL MEDICINE

## 2021-10-25 RX ORDER — IOPAMIDOL 755 MG/ML
58 INJECTION, SOLUTION INTRAVASCULAR ONCE
Status: COMPLETED | OUTPATIENT
Start: 2021-10-25 | End: 2021-10-25

## 2021-10-25 RX ADMIN — IOPAMIDOL 58 ML: 755 INJECTION, SOLUTION INTRAVENOUS at 07:03

## 2021-10-25 RX ADMIN — SODIUM CHLORIDE 85 ML: 9 INJECTION, SOLUTION INTRAVENOUS at 07:03

## 2021-11-04 ASSESSMENT — SLEEP AND FATIGUE QUESTIONNAIRES
HOW LIKELY ARE YOU TO NOD OFF OR FALL ASLEEP WHEN YOU ARE A PASSENGER IN A CAR FOR AN HOUR WITHOUT A BREAK: MODERATE CHANCE OF DOZING
HOW LIKELY ARE YOU TO NOD OFF OR FALL ASLEEP WHILE SITTING INACTIVE IN A PUBLIC PLACE: WOULD NEVER DOZE
HOW LIKELY ARE YOU TO NOD OFF OR FALL ASLEEP IN A CAR, WHILE STOPPED FOR A FEW MINUTES IN TRAFFIC: WOULD NEVER DOZE
HOW LIKELY ARE YOU TO NOD OFF OR FALL ASLEEP WHILE WATCHING TV: SLIGHT CHANCE OF DOZING
HOW LIKELY ARE YOU TO NOD OFF OR FALL ASLEEP WHILE SITTING AND TALKING TO SOMEONE: WOULD NEVER DOZE
HOW LIKELY ARE YOU TO NOD OFF OR FALL ASLEEP WHILE SITTING AND READING: SLIGHT CHANCE OF DOZING
HOW LIKELY ARE YOU TO NOD OFF OR FALL ASLEEP WHILE SITTING QUIETLY AFTER LUNCH WITHOUT ALCOHOL: WOULD NEVER DOZE
HOW LIKELY ARE YOU TO NOD OFF OR FALL ASLEEP WHILE LYING DOWN TO REST IN THE AFTERNOON WHEN CIRCUMSTANCES PERMIT: HIGH CHANCE OF DOZING

## 2021-11-05 ENCOUNTER — VIRTUAL VISIT (OUTPATIENT)
Dept: SLEEP MEDICINE | Facility: CLINIC | Age: 61
End: 2021-11-05
Payer: COMMERCIAL

## 2021-11-05 ENCOUNTER — VIRTUAL VISIT (OUTPATIENT)
Dept: PSYCHIATRY | Facility: CLINIC | Age: 61
End: 2021-11-05
Attending: NURSE PRACTITIONER
Payer: COMMERCIAL

## 2021-11-05 VITALS — BODY MASS INDEX: 24.59 KG/M2 | WEIGHT: 144 LBS | HEIGHT: 64 IN

## 2021-11-05 DIAGNOSIS — F43.10 PTSD (POST-TRAUMATIC STRESS DISORDER): ICD-10-CM

## 2021-11-05 DIAGNOSIS — F51.04 CHRONIC INSOMNIA: Primary | ICD-10-CM

## 2021-11-05 PROCEDURE — 99214 OFFICE O/P EST MOD 30 MIN: CPT | Mod: 95 | Performed by: NURSE PRACTITIONER

## 2021-11-05 PROCEDURE — 90832 PSYTX W PT 30 MINUTES: CPT | Mod: 95 | Performed by: PSYCHOLOGIST

## 2021-11-05 RX ORDER — BUSPIRONE HYDROCHLORIDE 10 MG/1
10 TABLET ORAL 2 TIMES DAILY
Qty: 180 TABLET | Refills: 0 | Status: SHIPPED | OUTPATIENT
Start: 2021-11-05 | End: 2022-01-28

## 2021-11-05 ASSESSMENT — PAIN SCALES - GENERAL: PAINLEVEL: NO PAIN (0)

## 2021-11-05 ASSESSMENT — MIFFLIN-ST. JEOR: SCORE: 1198.18
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
## 2021-11-05 NOTE — PROGRESS NOTES
"Whit is a 61 year old who is being evaluated via a billable video visit.      How would you like to obtain your AVS? MyChart  If the video visit is dropped, the invitation should be resent by: Text to cell phone: [phone]  Will anyone else be joining your video visit? No     Will patient be in the state of [place] at time of video visit: YES     Tiffany LOVETT CMA, SLEEP MEDICINE, 11/5/2021 10:27 AM      Video Start Time: 1:04 PM  Video-Visit Details    Type of service:  Video Visit    Video End Time:1:21 PM    Originating Location (pt. Location): Home    Distant Location (provider location):  [facility]     Platform used for Video Visit: [facility]     SLEEP [facility] VIRTUAL VIDEO FOLLOW-UP VISIT  Sleep Psychology    Patient Name: Preethi Mays  MRN:  [id number]  Date of Service: Nov 5, 2021       Subjective Report     Preethi Mays  returns for a telehealth video visit to discuss progress in implementing behavioral strategies for the management of insomnia.  Patient consent for initiation of video visit was obtained and documented prior to initiation of visit.     Preethi reports she is doing \"fine\" with respect to her sleep.  There has been episodes of steroid use to address respiratory issues.    She relays she is  \"Developing a good system\" of behavior for maintaining helpful sleep habits.    Reinforced stimulus control and strategies that help maintain helpful sleep habits and manage recurrent bouts of insomnia     .     Sleep Data:     Source of Sleep Estimates:  verbal self-report    Average Time in Bed: 7.5 hours  Average Total Sleep Time: 6.5 hrs  Sleep Efficiency: Greater than 85%    EPWORTH SLEEPINESS SCALE    Sitting and reading 1   Watching TV 1   Sitting, inactive in a public place (theatre or mtg.) 0    As a passenger in a car 2   Lying down to rest in the afternoon when circumstance permit 3   Sitting and talking to someone 0   Sitting quietly after lunch without " "alcohol 0   In a car, while stopped for a few minutes in traffic 0   TOTAL SCORE (nl <11) 7     INSOMNIA SEVERITY INDEX     Difficulty falling asleep 0   Difficult staying asleep 2   Problems waking up to early 0   How SATISFIED/DISSATISFIED are you with your CURRENT sleep pattern? 1   How NOTICEABLE to others do you think your sleep pattern is in terms of your quality of life? 0   How WORRIED/DISTRESSED are you about your current sleep pattern? 0   To what extent do you consider your sleep problem to INTERFERE with your daily fuctioning(e.g. daytime fatigue, mood, ability to function at work/daily chores, concentration, mood,etc.) CURRENTLY? 0   INSOMNIA SEVERITY INDEX TOTAL SCORE 3    Absence of insomnia (0-7); sub-threshold insomnia (8-14); moderate insomnia (15-21); and severe insomnia (22-28)       Interventions     Strategies and recommendations including implementation and maintenance of of stimulus control were discussed today.       Vital Signs     Ht 1.626 m (5' 4\")   Wt 65.3 kg (144 lb)   BMI 24.72 kg/m       Mental Status     Orientation:  X3  Mood:  normal  Affect:  Congruent with mood  Speech/Language:  Normal  Thought Process: Intact  Associations:  Normal  Thought Content: Normal  Patient does not report any suicidal ideation, intention or plan.    Diagnostic Impressions and Plan     Chronic insomnia    Patient has been able to maintain improvements in sleep quality over the last several months.  She has also gained insight into how the medication such as prednisone and interrupt her affect sleep.  She has been able to respond to these changes without generating unhelpful response or anxiety.    Plan:  Continue current sleep schedule and plan    Follow-up: as needed if symptoms recur      Cal Iyer PsyD, PENELOPE, DBS  Diplomate, Behavioral Sleep Medicine  [facility]      Note: This dictation was created using voice recognition software. This document may contain an " error not identified before finalizing the document. If the error changes the accuracy of the document, I would appreciate it being brought to my attention.

## 2021-11-05 NOTE — PATIENT INSTRUCTIONS
**For crisis resources, please see the information at the end of this document**     Patient Education      Thank you for coming to the [facility] [place] [facility].    Lab Testing:  If you had lab testing today and your results are reassuring or normal they will be mailed to you or sent through [employer] within 7 days. If the lab tests need quick action we will call you with the results. The phone number we will call with results is # [phone] (home) . If this is not the best number please call our clinic and change the number.    Medication Refills:  If you need any refills please call your pharmacy and they will contact us. Our fax number for refills is [phone]. Please allow three business for refill processing. If you need to  your refill at a new pharmacy, please contact the new pharmacy directly. The new pharmacy will help you get your medications transferred.     Scheduling:  If you have any concerns about today's visit or wish to schedule another appointment please call our office during normal business hours [phone] (8-5:00 M-F)    Contact Us:  Please call [phone] during business hours (8-5:00 M-F).  If after clinic hours, or on the weekend, please call  [phone].    Financial Assistance [phone]  [employer] Billing [phone]  Central Billing Office, [facility]: [phone]  [facility] Billing [phone]  Medical Records [phone]  [facility] Patient Bill of Rights [email or web address]       MENTAL HEALTH CRISIS NUMBERS:  For a medical emergency please call  911 or go to the nearest ER.     [facility]:   [facility] -[phone]   Crisis Residence [facility] -[phone]   Walk-In Counseling Center [facility] -[phone]   COPE 24/7 [place] Mobile Team -[phone] (adults)/[phone] (child)  CHILD: [facility] needs assessment  team - [phone]      [facility]:   [facility] - [phone]   Walk-in counseling [facility] - [phone]   Walk-in counseling [facility] - [phone]   Crisis Residence [facility] Res[facility] - [phone]  Urgent Care Adult Mental [facility]-[phone] mobile unit/ 24/7 crisis line    National Crisis Numbers:   National Suicide Prevention Lifeline: 5-422-212-TALK (603-906-8100)  Poison Control Center - 9-391-070-6988  [employer]/resources for a list of additional resources (SOS)  Trans Lifeline a hotline for transgender people 1-551.436.3412  The Anastacio Project a hotline for LGBT youth 0-582-966-2087  Crisis Text Line: For any crisis 24/7   To: 463649  see www.crisistextline.org  - IF MAKING A CALL FEELS TOO HARD, send a text!         Again thank you for choosing [facility] MENTAL HE[facility] & ADDICTION [facility] and please let us know how we can best partner with you to improve you and your family's health.    You may be receiving a survey regarding this appointment. We would love to have your feedback, both positive and negative. The survey is done by an external company, so your answers are anonymous.

## 2021-11-05 NOTE — PATIENT INSTRUCTIONS
Your BMI is Body mass index is 24.72 kg/m .  Weight management is a personal decision.  If you are interested in exploring weight loss strategies, the following discussion covers the approaches that may be successful. Body mass index (BMI) is one way to tell whether you are at a healthy weight, overweight, or obese. It measures your weight in relation to your height.  A BMI of 18.5 to 24.9 is in the healthy range. A person with a BMI of 25 to 29.9 is considered overweight, and someone with a BMI of 30 or greater is considered obese. More than two-thirds of American adults are considered overweight or obese.  Being overweight or obese increases the risk for further weight gain. Excess weight may lead to heart disease and diabetes.  Creating and following plans for healthy eating and physical activity may help you improve your health.  Weight control is part of healthy lifestyle and includes exercise, emotional health, and healthy eating habits. Careful eating habits lifelong are the mainstay of weight control. Though there are significant health benefits from weight loss, long-term weight loss with diet alone may be very difficult to achieve- studies show long-term success with dietary management in less than 10% of people. Attaining a healthy weight may be especially difficult to achieve in those with severe obesity. In some cases, medications, devices and surgical management might be considered.  What can you do?  If you are overweight or obese and are interested in methods for weight loss, you should discuss this with your provider.     Consider reducing daily calorie intake by 500 calories.     Keep a food journal.     Avoiding skipping meals, consider cutting portions instead.    Diet combined with exercise helps maintain muscle while optimizing fat loss. Strength training is particularly important for building and maintaining muscle mass. Exercise helps reduce stress, increase energy, and improves fitness.  Increasing exercise without diet control, however, may not burn enough calories to loose weight.       Start walking three days a week 10-20 minutes at a time    Work towards walking thirty minutes five days a week     Eventually, increase the speed of your walking for 1-2 minutes at time    In addition, we recommend that you review healthy lifestyles and methods for weight loss available through the National Institutes of Health patient information sites:  http://win.niddk.nih.gov/publications/index.htm    And look into health and wellness programs that may be available through your health insurance provider, employer, local community center, or italia club.

## 2021-12-14 DIAGNOSIS — Z17.0 MALIGNANT NEOPLASM OF OVERLAPPING SITES OF RIGHT BREAST IN FEMALE, ESTROGEN RECEPTOR POSITIVE (H): Primary | ICD-10-CM

## 2021-12-14 DIAGNOSIS — C50.811 MALIGNANT NEOPLASM OF OVERLAPPING SITES OF RIGHT BREAST IN FEMALE, ESTROGEN RECEPTOR POSITIVE (H): Primary | ICD-10-CM

## 2021-12-14 NOTE — TELEPHONE ENCOUNTER
[facility] Triage    Incoming Call Tamoxifen Refill    Last prescribing provider: Siomara    Last clinic visit date: 10/15/2021    Any missed appointments or no-shows since last clinic visit?: No    Recommendations for requested medication (if none, N/A): NA    Next clinic visit date: 1/2022    Any other pertinent information (if none, N/A): Has 5 left  Copied from Dr. Stringer's note of 10/15/21  THERAPY TO DATE:  1. Lumpectomy and sentinel lymph node biopsy 7/9/2019  2. Adjuvant radiotherapy 9/4 through 10/1/2019  3. Tamoxifen since 10/28/2019    Routing to Dr. Stringer and Corrie Chen

## 2021-12-15 RX ORDER — TAMOXIFEN CITRATE 10 MG/1
TABLET ORAL
Qty: 90 TABLET | Refills: 3 | Status: SHIPPED | OUTPATIENT
Start: 2021-12-15 | End: 2022-11-30

## 2022-01-13 NOTE — PROGRESS NOTES
Oncology/Hematology Visit Note  Jan 14, 2022    Reason for Visit: follow up of breast cancer     ASSESSMENT:  1. Stage IA invasive ductal breast cancer, status post lumpectomy and radiotherapy, and currently on tamoxifen 10 mg daily.       PLAN:  1.   No s/s concerning for recurrence today. Follow-up in 3 months with Dr. Stringer. Annual mammogram in June.   2.   Currently taking tamoxifen 10 mg p.o. daily. Discussed increasing dose to 20 mg since initial AEs have stabilized. She may do this but wants to discuss at next visit. We also discussed aromatase inhibitors. She has a family history of osteoporosis. She had a DEXA in the past with osteopenia, however she has been weight lifting since then. She will see PCP in March. Encouraged her to obtain repeat DEXA scan. This will help guide discussion with Dr. Stringer in April.      Lali Aguilar PA-C       History of Present Illness:     DIAGNOSIS:  1. Stage IA invasive ductal right breast cancer, diagnosed definitively at lumpectomy and sentinel lymph node biopsy 7/9/2019.  Whit had a 1.7 cm poorly differentiated primary tumor.  It was 97% positive for the estrogen receptor with strong staining, 84% positive for the progesterone receptor with moderate staining, and negative for HER-2 amplification by IHC and FISH.  2 sentinel lymph nodes were negative for involvement.  Oncotype recurrence score was ordered and this was 20.    THERAPY TO DATE:  1. Lumpectomy and sentinel lymph node biopsy 7/9/2019  2. Adjuvant radiotherapy 9/4 through 10/1/2019  3. Tamoxifen since 10/28/2019    Interval History: Whit is feeling well. Her BULL has calmed down. She saw pulmonary but was unable to obtain a clear diagnosis. She has continued to use inhalers and this has helped. Tried to stop and BULL was worse. No acute URI symptoms or fevers/chills. She remains on tamoxifen 10 mg daily. She asks about trying to increase to 20 mg. When she first started tamoxifen she had worsening of arthritis  pain in R hand as well as migraines. She has frequent hot flashes as well. Feels like all these symptoms have stabilized now. She has steroid injections into joint every 6 months.     No breast concerns, lumps/bumps, new or different persistent pain, change in energy or appetite. Bowels are slower on tamoxifen. No anorexia or abdominal pain.     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     Current Outpatient Medications   Medication Sig Dispense Refill     albuterol (PROAIR HFA/PROVENTIL HFA/VENTOLIN HFA) 108 (90 Base) MCG/ACT inhaler Inhale 2 puffs into the lungs every 6 hours as needed for shortness of breath / dyspnea or wheezing 8 g 0     busPIRone (BUSPAR) 10 MG tablet Take 1 tablet (10 mg) by mouth 2 times daily 180 tablet 0     cyclobenzaprine (FLEXERIL) 5 MG tablet Take 5 mg by mouth daily as needed for muscle spasms       doxycycline (PERIOSTAT) 20 MG tablet Take 20 mg by mouth 2 times daily       [id number]-Omega 3-Phytosterols (BP VIT 3) 1 MG CAPS One capsule BID       fluorometholone (FML LIQUIFILM) 0.1 % ophthalmic suspension        ketoconazole (NIZORAL) 2 % external cream APPLY EXTERNALLY TO FEET TWICE DAILY       Melatonin 2.5 MG CAPS Take 1 tablet by mouth nightly as needed       metroNIDAZOLE (NORITATE) 1 % cream Apply topically daily       montelukast (SINGULAIR) 10 MG tablet Take 10 mg by mouth       multivitamin w/minerals (MULTI-VITAMIN) tablet Take 1 tablet by mouth daily       Rizatriptan Benzoate (MAXALT PO) Take 10 mg by mouth as needed for migraine       tamoxifen (NOLVADEX) 10 MG tablet TAKE 1 TABLET BY MOUTH DAILY. INCREASE AS TOLERATED OR INSTRUCTED BY DOCTOR LATRICE 90 tablet 3     Vitamin D, Cholecalciferol, 1000 units CAPS Take 2 tablets by mouth daily 100 capsule 3     WIXELA INHUB 100-50 MCG/DOSE inhaler INHALE 1 PUFF BY MOUTH TWICE DAILY         PHYSICAL EXAM:  /77 (BP Location: Right arm, Patient Position: Sitting, Cuff Size: Adult  Regular)   Pulse 76   Temp 98.3  F (36.8  C) (Oral)   Resp 16   Wt 66.5 kg (146 lb 8 oz)   SpO2 100%   BMI 25.15 kg/m    Wt Readings from Last 4 Encounters:   01/14/22 66.5 kg (146 lb 8 oz)   11/05/21 65.3 kg (144 lb)   10/15/21 65.5 kg (144 lb 6.4 oz)   03/27/21 61.2 kg (135 lb)   General: Alert, oriented, pleasant, NAD  HEENT: Normocephalic, atraumatic, no icterus  Neck: No cervical or supraclavicular LAD.  Axillary: No LAD  Breast: L breast benign. R breast without palpable nodularity or mass.   Lungs: CTA bilaterally, normal work of breathing  Cardiac: RRR, S1, S2, no murmurs  Abdomen: Soft, nontender, nondistended. Normoactive bowel sounds. No hepatosplenomegaly, masses  Neuro: CNII-XII grossly intact  Extremities: No pedal edema        Laboratory/Imaging Data: Nothing new

## 2022-01-14 ENCOUNTER — ONCOLOGY VISIT (OUTPATIENT)
Dept: ONCOLOGY | Facility: CLINIC | Age: 62
End: 2022-01-14
Attending: INTERNAL MEDICINE
Payer: COMMERCIAL

## 2022-01-14 ENCOUNTER — APPOINTMENT (OUTPATIENT)
Dept: LAB | Facility: CLINIC | Age: 62
End: 2022-01-14
Attending: INTERNAL MEDICINE
Payer: COMMERCIAL

## 2022-01-14 VITALS
SYSTOLIC BLOOD PRESSURE: 116 MMHG | TEMPERATURE: 98.3 F | RESPIRATION RATE: 16 BRPM | OXYGEN SATURATION: 100 % | WEIGHT: 146.5 LBS | BODY MASS INDEX: 25.15 KG/M2 | HEART RATE: 76 BPM | DIASTOLIC BLOOD PRESSURE: 77 MMHG

## 2022-01-14 DIAGNOSIS — Z17.0 MALIGNANT NEOPLASM OF OVERLAPPING SITES OF RIGHT BREAST IN FEMALE, ESTROGEN RECEPTOR POSITIVE (H): Primary | ICD-10-CM

## 2022-01-14 DIAGNOSIS — C50.811 MALIGNANT NEOPLASM OF OVERLAPPING SITES OF RIGHT BREAST IN FEMALE, ESTROGEN RECEPTOR POSITIVE (H): Primary | ICD-10-CM

## 2022-01-14 PROCEDURE — 99214 OFFICE O/P EST MOD 30 MIN: CPT | Performed by: PHYSICIAN ASSISTANT

## 2022-01-14 PROCEDURE — G0463 HOSPITAL OUTPT CLINIC VISIT: HCPCS

## 2022-01-14 ASSESSMENT — PAIN SCALES - GENERAL: PAINLEVEL: NO PAIN (0)

## 2022-01-14 NOTE — NURSING NOTE
"Oncology Rooming Note    January 14, 2022 1:30 PM   Preethi Mays is a 61 year old female who presents for:    Chief Complaint   Patient presents with     Lab Only     VS taken.      Oncology Clinic Visit     breast cancer     Initial Vitals: /77 (BP Location: Right arm, Patient Position: Sitting, Cuff Size: Adult Regular)   Pulse 76   Temp 98.3  F (36.8  C) (Oral)   Resp 16   Wt 66.5 kg (146 lb 8 oz)   SpO2 100%   BMI 25.15 kg/m   Estimated body mass index is 25.15 kg/m  as calculated from the following:    Height as of 11/5/21: 1.626 m (5' 4\").    Weight as of this encounter: 66.5 kg (146 lb 8 oz). Body surface area is 1.73 meters squared.  No Pain (0) Comment: Data Unavailable   No LMP recorded. Patient is postmenopausal.  Allergies reviewed: Yes  Medications reviewed: Yes    Medications: Medication refills not needed today.  Pharmacy name entered into [facility]:    [id number] - [place], [place] - [address] [place] [address] [id number] - [place], [place] - [address] [facility] [address]    Clinical concerns: none       Rosa Chery CMA            "

## 2022-01-14 NOTE — LETTER
1/14/2022         RE: Preethi Mays  [address] [facility] [address]      Oncology/Hematology Visit Note  Jan 14, 2022    Reason for Visit: follow up of breast cancer     ASSESSMENT:  1. Stage IA invasive ductal breast cancer, status post lumpectomy and radiotherapy, and currently on tamoxifen 10 mg daily.       PLAN:  1.   No s/s concerning for recurrence today. Follow-up in 3 months with Dr. Stringer. Annual mammogram in June.   2.   Currently taking tamoxifen 10 mg p.o. daily. Discussed increasing dose to 20 mg since initial AEs have stabilized. She may do this but wants to discuss at next visit. We also discussed aromatase inhibitors. She has a family history of osteoporosis. She had a DEXA in the past with osteopenia, however she has been weight lifting since then. She will see PCP in March. Encouraged her to obtain repeat DEXA scan. This will help guide discussion with Dr. Stringer in April.      Lali Aguilar PA-C       History of Present Illness:     DIAGNOSIS:  1. Stage IA invasive ductal right breast cancer, diagnosed definitively at lumpectomy and sentinel lymph node biopsy 7/9/2019.  Whit had a 1.7 cm poorly differentiated primary tumor.  It was 97% positive for the estrogen receptor with strong staining, 84% positive for the progesterone receptor with moderate staining, and negative for HER-2 amplification by IHC and FISH.  2 sentinel lymph nodes were negative for involvement.  Oncotype recurrence score was ordered and this was 20.    THERAPY TO DATE:  1. Lumpectomy and sentinel lymph node biopsy 7/9/2019  2. Adjuvant radiotherapy 9/4 through 10/1/2019  3. Tamoxifen since 10/28/2019    Interval History: Whit is feeling well. Her BULL has calmed down. She saw pulmonary but was unable to obtain a clear diagnosis. She has continued to use inhalers and this has helped. Tried to stop and BULL was worse. No acute URI symptoms or fevers/chills. She remains on tamoxifen 10 mg daily. She asks about  trying to increase to 20 mg. When she first started tamoxifen she had worsening of arthritis pain in R hand as well as migraines. She has frequent hot flashes as well. Feels like all these symptoms have stabilized now. She has steroid injections into joint every 6 months.     No breast concerns, lumps/bumps, new or different persistent pain, change in energy or appetite. Bowels are slower on tamoxifen. No anorexia or abdominal pain.     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     Current Outpatient Medications   Medication Sig Dispense Refill     albuterol (PROAIR HFA/PROVENTIL HFA/VENTOLIN HFA) 108 (90 Base) MCG/ACT inhaler Inhale 2 puffs into the lungs every 6 hours as needed for shortness of breath / dyspnea or wheezing 8 g 0     busPIRone (BUSPAR) 10 MG tablet Take 1 tablet (10 mg) by mouth 2 times daily 180 tablet 0     cyclobenzaprine (FLEXERIL) 5 MG tablet Take 5 mg by mouth daily as needed for muscle spasms       doxycycline (PERIOSTAT) 20 MG tablet Take 20 mg by mouth 2 times daily       [id number]-Omega 3-Phytosterols (BP VIT 3) 1 MG CAPS One capsule BID       fluorometholone (FML LIQUIFILM) 0.1 % ophthalmic suspension        ketoconazole (NIZORAL) 2 % external cream APPLY EXTERNALLY TO FEET TWICE DAILY       Melatonin 2.5 MG CAPS Take 1 tablet by mouth nightly as needed       metroNIDAZOLE (NORITATE) 1 % cream Apply topically daily       montelukast (SINGULAIR) 10 MG tablet Take 10 mg by mouth       multivitamin w/minerals (MULTI-VITAMIN) tablet Take 1 tablet by mouth daily       Rizatriptan Benzoate (MAXALT PO) Take 10 mg by mouth as needed for migraine       tamoxifen (NOLVADEX) 10 MG tablet TAKE 1 TABLET BY MOUTH DAILY. INCREASE AS TOLERATED OR INSTRUCTED BY DOCTOR LATRICE 90 tablet 3     Vitamin D, Cholecalciferol, 1000 units CAPS Take 2 tablets by mouth daily 100 capsule 3     WIXELA INHUB 100-50 MCG/DOSE inhaler INHALE 1 PUFF BY MOUTH TWICE DAILY          PHYSICAL EXAM:  /77 (BP Location: Right arm, Patient Position: Sitting, Cuff Size: Adult Regular)   Pulse 76   Temp 98.3  F (36.8  C) (Oral)   Resp 16   Wt 66.5 kg (146 lb 8 oz)   SpO2 100%   BMI 25.15 kg/m    Wt Readings from Last 4 Encounters:   01/14/22 66.5 kg (146 lb 8 oz)   11/05/21 65.3 kg (144 lb)   10/15/21 65.5 kg (144 lb 6.4 oz)   03/27/21 61.2 kg (135 lb)   General: Alert, oriented, pleasant, NAD  HEENT: Normocephalic, atraumatic, no icterus  Neck: No cervical or supraclavicular LAD.  Axillary: No LAD  Breast: L breast benign. R breast without palpable nodularity or mass.   Lungs: CTA bilaterally, normal work of breathing  Cardiac: RRR, S1, S2, no murmurs  Abdomen: Soft, nontender, nondistended. Normoactive bowel sounds. No hepatosplenomegaly, masses  Neuro: CNII-XII grossly intact  Extremities: No pedal edema        Laboratory/Imaging Data: Nothing new               Lali Aguilar PA-C

## 2022-01-14 NOTE — NURSING NOTE
Chief Complaint   Patient presents with     Lab Only     VS taken.      Vital signs taken. No labs ordered. Discussed with pt and she is ok with speaking with provider and getting labs drawn in clinic if needed.    Cherrie Yang RN

## 2022-01-28 ENCOUNTER — VIRTUAL VISIT (OUTPATIENT)
Dept: PSYCHIATRY | Facility: CLINIC | Age: 62
End: 2022-01-28
Attending: NURSE PRACTITIONER
Payer: COMMERCIAL

## 2022-01-28 DIAGNOSIS — F43.10 PTSD (POST-TRAUMATIC STRESS DISORDER): ICD-10-CM

## 2022-01-28 PROCEDURE — 99213 OFFICE O/P EST LOW 20 MIN: CPT | Mod: 95 | Performed by: NURSE PRACTITIONER

## 2022-01-28 RX ORDER — BUSPIRONE HYDROCHLORIDE 10 MG/1
10 TABLET ORAL 2 TIMES DAILY
Qty: 180 TABLET | Refills: 0 | Status: SHIPPED | OUTPATIENT
Start: 2022-01-28 | End: 2022-04-22

## 2022-01-28 ASSESSMENT — PAIN SCALES - GENERAL: PAINLEVEL: NO PAIN (0)
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
## 2022-01-28 NOTE — PATIENT INSTRUCTIONS
**For crisis resources, please see the information at the end of this document**   Patient Education    Thank you for coming to the [facility] MENTAL HEALTH & ADDICTION [place] CLIN[facility].    Lab Testing:  If you had lab testing today and your results are reassuring or normal they will be mailed to you or sent through MobileMD within 7 days. If the lab tests need quick action we will call you with the results. The phone number we will call with results is # [phone] (home) . If this is not the best number please call our clinic and change the number.    Medication Refills:  If you need any refills please call your pharmacy and they will contact us. Our fax number for refills is [phone]. Please allow three business for refill processing. If you need to  your refill at a new pharmacy, please contact the new pharmacy directly. The new pharmacy will help you get your medications transferred.     Scheduling:  If you have any concerns about today's visit or wish to schedule another appointment please call our office during normal business hours [phone] (8-5:00 M-F)    Contact Us:  Please call [phone] during business hours (8-5:00 M-F).  If after clinic hours, or on the weekend, please call  [phone].    Financial Assistance [phone]  [facility] Billing [phone]  Central Billing Office, [facility]: [phone]  [facility] Billing [phone]  Medical Records [phone]  [facility] Patient Bill of Rights [email or web address]       MENTAL HEALTH CRISIS NUMBERS:  For a medical emergency please call  911 or go to the nearest ER.     [facility]:   [facility] -[phone]   Crisis Residence [facility]idence -[phone]   Walk-In Counseling Center [facility] -[phone]   COPE 24/7 [facility] Mobile Team -[phone] (adults)/[phone] (child)  CHILD: [facility] needs assessment team -  [phone]      [facility]:   [facility] - [phone]   Walk-in counseling [facility] - [phone]   Walk-in counseling [facility] - [phone]   Crisis Residence [facility] - [phone]  Urgent Care Adult Mental Zmvdtk-[phone] mobile unit/ 24/7 crisis line    National Crisis Numbers:   National Suicide Prevention Lifeline: 5-591-347-TALK (591-018-7586)  Poison Control Center - 3-198-079-5563  Skeed/resources for a list of additional resources (SOS)  Trans Lifeline a hotline for transgender people 5-674-816-7157  The Anastacio Project a hotline for LGBT youth 7-696-846-3503  Crisis Text Line: For any crisis 24/7   To: 256439  see www.crisistextline.org  - IF MAKING A CALL FEELS TOO HARD, send a text!         Again thank you for choosing [facility] MENTAL HEALTH & ADDICTION [facility] and please let us know how we can best partner with you to improve you and your family's health.    You may be receiving a survey regarding this appointment. We would love to have your feedback, both positive and negative. The survey is done by an external company, so your answers are anonymous.

## 2022-02-13 ENCOUNTER — HEALTH MAINTENANCE LETTER (OUTPATIENT)
Age: 62
End: 2022-02-13

## 2022-04-03 NOTE — PROGRESS NOTES
[facility] Medical Oncology Note                Apr 4, 2022      Outpatient Progress Note      Assessment:     1. Stage IA invasive ductal breast cancer, status post lumpectomy and radiotherapy, and currently on tamoxifen with continued adequate tolerance. She has some hot flashes and muscle issues, but weighing the risks and benefits, she wants to stay on the drug. Going to an AI doesn't make sense for Se, since she has osteopenia and joint issues already.  2. Now, over 2 years out from the time of diagnosis with no evidence of recurrent disease by history or physical exam.  3. Other psychosocial stressors discussed at length.      Plan:       1. Continue tamoxifen 20 mg p.o. daily  2. Return to clinic in 6 months as we continue in follow-up mode      Terrance Stringer MD, MSc  Associate Professor of Medicine  [employer]  [facility]  [address] [place] [address]  [phone]    __________________________________________________________________    Diagnosis     DIAGNOSIS:  1. Stage IA invasive ductal breast cancer, diagnosed definitively at lumpectomy and sentinel lymph node biopsy 7/9/2019.  Whit had a 1.7 cm poorly differentiated primary tumor.  It was 97% positive for the estrogen receptor with strong staining, 84% positive for the progesterone receptor with moderate staining, and negative for HER-2 amplification by IHC and FISH.  2 sentinel lymph nodes were negative for involvement.  Oncotype recurrence score was ordered and this was 20.  2. When seen in October 2021, Whit had significant pulmonary symptoms, for which we ordered at CT that was completely negative.      History of Present Illness/Therapy to Date:     1. Lumpectomy and sentinel lymph node biopsy 7/9/2019  2. Adjuvant radiotherapy 9/4 through 10/1/2019  3. Tamoxifen since 10/28/2019      Interval history:     Whit is back    She is doing relatively well.    She has started some weight  lifting and ended up having an issue in her right wrist.  They may be doing surgery.    Weighing the risks and benefits of tamoxifen, she wants to stay on it.  She does have some charley horses at night.  However she just got a bone density test that showed osteopenia and does not want to risk worsening arthralgias she would switch to an aromatase inhibitor.    Both of her parents are alive at the age of 90.  They live in [place].  Her sister is dealing with a lot of the issues there.    She is going to retire from her job.  It is getting to be too much.    Multiple other psychosocial stressors were discussed at length.      On ROS in addition to the above  Endorses:    Denies  fevers, chills, NS, HA, dysphagia/odynophagia, change in weight, change in appetite, cough, SOB, CP, n/v, abd pain, constipation/diarrhea, hematochezia, dysuria, hematuria, swelling, rashes, lymphadenopathy      Past Medical History:   I have reviewed this patient's past medical history   Past Medical History:   Diagnosis Date     Blepharitis      Depression      PTSD (post-traumatic stress disorder)      Rosacea      TMJ (dislocation of temporomandibular joint)           Past Surgical History:    I have reviewed this patient's past surgical history       Social History:   Tobacco, ETOH, and rec drugs reviewed and as noted below with the following exceptions:  Whit grew up in [place] and graduated from high school in 1977.  She then went to [employer] where she met her  Kevan.  They have 4 children, Nadia, Martin, Skylar, and Santo.  Kevan is a dentist.  I went to medical school with his brother, Brad.  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          Family History:     Family History   Problem Relation Age of Onset     Coronary Artery Disease Mother      Breast Cancer Mother      Coronary Artery Disease Father             Medications:     Current Outpatient Medications    Medication Sig Dispense Refill     albuterol (PROAIR HFA/PROVENTIL HFA/VENTOLIN HFA) 108 (90 Base) MCG/ACT inhaler Inhale 2 puffs into the lungs every 6 hours as needed for shortness of breath / dyspnea or wheezing 8 g 0     busPIRone (BUSPAR) 10 MG tablet Take 1 tablet (10 mg) by mouth 2 times daily 180 tablet 0     cyclobenzaprine (FLEXERIL) 5 MG tablet Take 5 mg by mouth daily as needed for muscle spasms       doxycycline (PERIOSTAT) 20 MG tablet Take 20 mg by mouth 2 times daily       [id number]-Omega 3-Phytosterols (BP VIT 3) 1 MG CAPS One capsule BID       fluorometholone (FML LIQUIFILM) 0.1 % ophthalmic suspension        ketoconazole (NIZORAL) 2 % external cream APPLY EXTERNALLY TO FEET TWICE DAILY       Melatonin 2.5 MG CAPS Take 1 tablet by mouth nightly as needed       metroNIDAZOLE (NORITATE) 1 % cream Apply topically daily       montelukast (SINGULAIR) 10 MG tablet Take 10 mg by mouth       multivitamin w/minerals (MULTI-VITAMIN) tablet Take 1 tablet by mouth daily       Rizatriptan Benzoate (MAXALT PO) Take 10 mg by mouth as needed for migraine       tamoxifen (NOLVADEX) 10 MG tablet TAKE 1 TABLET BY MOUTH DAILY. INCREASE AS TOLERATED OR INSTRUCTED BY DOCTOR LATRICE 90 tablet 3     Vitamin D, Cholecalciferol, 1000 units CAPS Take 2 tablets by mouth daily 100 capsule 3     WIXELA INHUB 100-50 MCG/DOSE inhaler INHALE 1 PUFF BY MOUTH TWICE DAILY                Physical Exam:   There were no vitals taken for this visit.    ECOG PS: 0  Constitutional: WDWN female in NAD, pleasant and appropriate  HEENT:  NC/AT, no icterus, OP clear, MMM  Skin: No jaundice nor ecchymoses  Lungs: CTAB, no w/r/r, nonlabored breathing  Cardiovascular: RRR, S1, S2, no m/r/g  Abdomen: +BS, soft, nontender, nondistended, no organomegaly nor masses  MSK/Extremities: Warm, well perfused. No edema  LN: no cervical, supraclavicular, axillary, nor inguinal lymphadenopathy  Neurologic: alert, answering questions appropriately, moving all  extremities spontaneously. CN 2-12 grossly intact.  Psych: appropriate affect  Breast exam: The right breast is s/p lumpectomy and RT. The nipple is flatter and there is more firmness to the parechyma than the contralateral side. There is some prominence of the breast tissue in the upper half.  There really is just minimal hyperpigmentation and skin thickening.  The right axilla is negative.  The left breast has significant fibrocystic change, and is smaller than the contralateral side.  It is a heterogeneous exam but there is no dominant mass.  The left axilla is negative.  Data:     Recent Labs   Lab Test 03/27/21  1301 10/14/18  0609   WBC 11.0 8.6   NEUTROPHIL 90.9  --    HGB 12.8 11.1*    318     Recent Labs   Lab Test 03/27/21  1301 10/14/18  0609    139   POTASSIUM 3.7 4.1   CHLORIDE 108 107   CO2 23 25   ANIONGAP 7 7   BUN 17 18   CR 0.85 0.74   RACHEL 9.0 8.1*     No results for input(s): MAG, PHOS, LDH, URIC in the last 14586 hours.  No results for input(s): BILITOTAL, ALKPHOS, ALT, AST, ALBUMIN, LDH in the last 14979 hours.    No results found for this or any previous visit (from the past 24 hour(s)).    Other data           Labs, imaging and treatment plan reviewed with patient. All questions answered.        25 minutes spent on the date of the encounter doing chart review, review of outside records, review of test results, interpretation of tests, patient visit and documentation

## 2022-04-04 ENCOUNTER — ONCOLOGY VISIT (OUTPATIENT)
Dept: ONCOLOGY | Facility: CLINIC | Age: 62
End: 2022-04-04
Attending: PHYSICIAN ASSISTANT
Payer: COMMERCIAL

## 2022-04-04 VITALS
TEMPERATURE: 97.7 F | DIASTOLIC BLOOD PRESSURE: 71 MMHG | OXYGEN SATURATION: 100 % | HEART RATE: 65 BPM | HEIGHT: 63 IN | WEIGHT: 143.6 LBS | SYSTOLIC BLOOD PRESSURE: 135 MMHG | BODY MASS INDEX: 25.45 KG/M2

## 2022-04-04 DIAGNOSIS — Z17.0 MALIGNANT NEOPLASM OF OVERLAPPING SITES OF RIGHT BREAST IN FEMALE, ESTROGEN RECEPTOR POSITIVE (H): Primary | ICD-10-CM

## 2022-04-04 DIAGNOSIS — C50.811 MALIGNANT NEOPLASM OF OVERLAPPING SITES OF RIGHT BREAST IN FEMALE, ESTROGEN RECEPTOR POSITIVE (H): Primary | ICD-10-CM

## 2022-04-04 PROCEDURE — 99213 OFFICE O/P EST LOW 20 MIN: CPT | Performed by: INTERNAL MEDICINE

## 2022-04-04 PROCEDURE — G0463 HOSPITAL OUTPT CLINIC VISIT: HCPCS

## 2022-04-04 ASSESSMENT — PAIN SCALES - GENERAL: PAINLEVEL: MILD PAIN (3)

## 2022-04-04 NOTE — NURSING NOTE
"Oncology Rooming Note    April 4, 2022 10:50 AM   Preethi Mays is a 61 year old female who presents for:    Chief Complaint   Patient presents with     Oncology Clinic Visit     Breast Cancer      Initial Vitals: /71 (BP Location: Left arm, Patient Position: Sitting, Cuff Size: Adult Regular)   Pulse 65   Temp 97.7  F (36.5  C) (Oral)   Ht 1.61 m (5' 3.39\")   Wt 65.1 kg (143 lb 9.6 oz)   SpO2 100%   BMI 25.13 kg/m   Estimated body mass index is 25.13 kg/m  as calculated from the following:    Height as of this encounter: 1.61 m (5' 3.39\").    Weight as of this encounter: 65.1 kg (143 lb 9.6 oz). Body surface area is 1.71 meters squared.  Mild Pain (3) Comment: Data Unavailable   No LMP recorded. Patient is postmenopausal.  Allergies reviewed: Yes  Medications reviewed: Yes    Medications: Medication refills not needed today.  Pharmacy name entered into [facility]:    [id number] - [place], [place] - [address] [place] [address] [id number] - [place], [place] - [address] AT [facility] [address]    Clinical concerns: None     Kenji Youssef              "

## 2022-04-04 NOTE — LETTER
4/4/2022     RE: Preethi Mays  [address] [facility] [address]    Dear Colleague,    Thank you for referring your patient, Preethi Mays, to the [facility]. Please see a copy of my visit note below.        [facility] Medical Oncology Note                Apr 4, 2022      Outpatient Progress Note      Assessment:     1. Stage IA invasive ductal breast cancer, status post lumpectomy and radiotherapy, and currently on tamoxifen with continued adequate tolerance. She has some hot flashes and muscle issues, but weighing the risks and benefits, she wants to stay on the drug. Going to an AI doesn't make sense for Se, since she has osteopenia and joint issues already.  2. Now, over 2 years out from the time of diagnosis with no evidence of recurrent disease by history or physical exam.  3. Other psychosocial stressors discussed at length.      Plan:       1. Continue tamoxifen 20 mg p.o. daily  2. Return to clinic in 6 months as we continue in follow-up mode      Terrance Stringer MD, MSc  Associate Professor of Medicine  [facility]  [facility]  [address] [place] [address]  [phone]    __________________________________________________________________    Diagnosis     DIAGNOSIS:  1. Stage IA invasive ductal breast cancer, diagnosed definitively at lumpectomy and sentinel lymph node biopsy 7/9/2019.  Whit had a 1.7 cm poorly differentiated primary tumor.  It was 97% positive for the estrogen receptor with strong staining, 84% positive for the progesterone receptor with moderate staining, and negative for HER-2 amplification by IHC and FISH.  2 sentinel lymph nodes were negative for involvement.  Oncotype recurrence score was ordered and this was 20.  2. When seen in October 2021, Whit had significant pulmonary symptoms, for which we ordered at CT that was completely negative.      History of Present Illness/Therapy  to Date:     1. Lumpectomy and sentinel lymph node biopsy 7/9/2019  2. Adjuvant radiotherapy 9/4 through 10/1/2019  3. Tamoxifen since 10/28/2019      Interval history:     Whit is back    She is doing relatively well.    She has started some weight lifting and ended up having an issue in her right wrist.  They may be doing surgery.    Weighing the risks and benefits of tamoxifen, she wants to stay on it.  She does have some charley horses at night.  However she just got a bone density test that showed osteopenia and does not want to risk worsening arthralgias she would switch to an aromatase inhibitor.    Both of her parents are alive at the age of 90.  They live in [place].  Her sister is dealing with a lot of the issues there.    She is going to retire from her job.  It is getting to be too much.    Multiple other psychosocial stressors were discussed at length.      On ROS in addition to the above  Endorses:    Denies  fevers, chills, NS, HA, dysphagia/odynophagia, change in weight, change in appetite, cough, SOB, CP, n/v, abd pain, constipation/diarrhea, hematochezia, dysuria, hematuria, swelling, rashes, lymphadenopathy      Past Medical History:   I have reviewed this patient's past medical history   Past Medical History:   Diagnosis Date     Blepharitis      Depression      PTSD (post-traumatic stress disorder)      Rosacea      TMJ (dislocation of temporomandibular joint)           Past Surgical History:    I have reviewed this patient's past surgical history       Social History:   Tobacco, ETOH, and rec drugs reviewed and as noted below with the following exceptions:  Whit grew up in [place] and graduated from high school in 1977.  She then went to [employer] where she met her  Kevan.  They have 4 children, Nadia, Martin, Skylar, and Santo.  Kevan is a dentist.  I went to medical school with his brother, Brad.  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          Family History:     Family History   Problem Relation Age of Onset     Coronary Artery Disease Mother      Breast Cancer Mother      Coronary Artery Disease Father             Medications:     Current Outpatient Medications   Medication Sig Dispense Refill     albuterol (PROAIR HFA/PROVENTIL HFA/VENTOLIN HFA) 108 (90 Base) MCG/ACT inhaler Inhale 2 puffs into the lungs every 6 hours as needed for shortness of breath / dyspnea or wheezing 8 g 0     busPIRone (BUSPAR) 10 MG tablet Take 1 tablet (10 mg) by mouth 2 times daily 180 tablet 0     cyclobenzaprine (FLEXERIL) 5 MG tablet Take 5 mg by mouth daily as needed for muscle spasms       doxycycline (PERIOSTAT) 20 MG tablet Take 20 mg by mouth 2 times daily       [id number]-Omega 3-Phytosterols (BP VIT 3) 1 MG CAPS One capsule BID       fluorometholone (FML LIQUIFILM) 0.1 % ophthalmic suspension        ketoconazole (NIZORAL) 2 % external cream APPLY EXTERNALLY TO FEET TWICE DAILY       Melatonin 2.5 MG CAPS Take 1 tablet by mouth nightly as needed       metroNIDAZOLE (NORITATE) 1 % cream Apply topically daily       montelukast (SINGULAIR) 10 MG tablet Take 10 mg by mouth       multivitamin w/minerals (MULTI-VITAMIN) tablet Take 1 tablet by mouth daily       Rizatriptan Benzoate (MAXALT PO) Take 10 mg by mouth as needed for migraine       tamoxifen (NOLVADEX) 10 MG tablet TAKE 1 TABLET BY MOUTH DAILY. INCREASE AS TOLERATED OR INSTRUCTED BY DOCTOR LATRICE 90 tablet 3     Vitamin D, Cholecalciferol, 1000 units CAPS Take 2 tablets by mouth daily 100 capsule 3     WIXELA INHUB 100-50 MCG/DOSE inhaler INHALE 1 PUFF BY MOUTH TWICE DAILY                Physical Exam:   There were no vitals taken for this visit.    ECOG PS: 0  Constitutional: WDWN female in NAD, pleasant and appropriate  HEENT:  NC/AT, no icterus, OP clear, MMM  Skin: No jaundice nor ecchymoses  Lungs: CTAB, no w/r/r, nonlabored breathing  Cardiovascular: RRR, S1, S2, no m/r/g  Abdomen: +BS, soft,  nontender, nondistended, no organomegaly nor masses  MSK/Extremities: Warm, well perfused. No edema  LN: no cervical, supraclavicular, axillary, nor inguinal lymphadenopathy  Neurologic: alert, answering questions appropriately, moving all extremities spontaneously. CN 2-12 grossly intact.  Psych: appropriate affect  Breast exam: The right breast is s/p lumpectomy and RT. The nipple is flatter and there is more firmness to the parechyma than the contralateral side. There is some prominence of the breast tissue in the upper half.  There really is just minimal hyperpigmentation and skin thickening.  The right axilla is negative.  The left breast has significant fibrocystic change, and is smaller than the contralateral side.  It is a heterogeneous exam but there is no dominant mass.  The left axilla is negative.  Data:     Recent Labs   Lab Test 03/27/21  1301 10/14/18  0609   WBC 11.0 8.6   NEUTROPHIL 90.9  --    HGB 12.8 11.1*    318     Recent Labs   Lab Test 03/27/21  1301 10/14/18  0609    139   POTASSIUM 3.7 4.1   CHLORIDE 108 107   CO2 23 25   ANIONGAP 7 7   BUN 17 18   CR 0.85 0.74   RACHEL 9.0 8.1*     No results for input(s): MAG, PHOS, LDH, URIC in the last 60812 hours.  No results for input(s): BILITOTAL, ALKPHOS, ALT, AST, ALBUMIN, LDH in the last 33636 hours.    No results found for this or any previous visit (from the past 24 hour(s)).    Other data       Labs, imaging and treatment plan reviewed with patient. All questions answered.    25 minutes spent on the date of the encounter doing chart review, review of outside records, review of test results, interpretation of tests, patient visit and documentation     Again, thank you for allowing me to participate in the care of your patient.      Sincerely,      Terrance Stringer MD

## 2022-04-15 ENCOUNTER — IMMUNIZATION (OUTPATIENT)
Dept: NURSING | Facility: CLINIC | Age: 62
End: 2022-04-15
Payer: COMMERCIAL

## 2022-04-15 PROCEDURE — 0064A COVID-19,PF,MODERNA (18+ YRS BOOSTER .25ML): CPT

## 2022-04-15 PROCEDURE — 91306 COVID-19,PF,MODERNA (18+ YRS BOOSTER .25ML): CPT

## 2022-04-22 ENCOUNTER — VIRTUAL VISIT (OUTPATIENT)
Dept: PSYCHIATRY | Facility: CLINIC | Age: 62
End: 2022-04-22
Attending: NURSE PRACTITIONER
Payer: COMMERCIAL

## 2022-04-22 DIAGNOSIS — F43.10 PTSD (POST-TRAUMATIC STRESS DISORDER): ICD-10-CM

## 2022-04-22 PROCEDURE — 99214 OFFICE O/P EST MOD 30 MIN: CPT | Mod: 95 | Performed by: NURSE PRACTITIONER

## 2022-04-22 RX ORDER — BUSPIRONE HYDROCHLORIDE 10 MG/1
10 TABLET ORAL 2 TIMES DAILY
Qty: 180 TABLET | Refills: 0 | Status: SHIPPED | OUTPATIENT
Start: 2022-04-22 | End: 2022-07-11

## 2022-04-22 NOTE — PATIENT INSTRUCTIONS
**For crisis resources, please see the information at the end of this document**   Patient Education    Thank you for coming to the [facility] MENTAL HEALTH & ADDICTION [place] CLINIC.    Lab Testing:  If you had lab testing today and your results are reassuring or normal they will be mailed to you or sent through ComQi within 7 days. If the lab tests need quick action we will call you with the results. The phone number we will call with results is # [phone]. If this is not the best number please call our clinic and change the number.     Medication Refills:  If you need any refills please call your pharmacy and they will contact us. Our fax number for refills is [phone]. Please allow three business days for refill processing.   If you need to change to a different pharmacy, please contact the new pharmacy directly. The new pharmacy will help you get your medications transferred.     Contact Us:  Please call [phone] during business hours (8-5:00 M-F).  If you have medication related questions after clinic hours, or on the weekend, please call [phone].    Financial Assistance [phone]  Medical Records [phone]       MENTAL HEALTH CRISIS RESOURCES:  For a emergency help, please call 911 or go to the nearest Emergency Department.     Emergency Walk-In Options:   EmPATH Unit @ [facility] ([place]): [phone] - Specialized mental health emergency area designed to be calming  [facility] ([place]): [phone]  [facility] Acute Psychiat[facility] ([place]): [phone]  [facility]): [phone]    County Crisis Information:   [place]: [phone]  [place]: [phone]  [place] ([place] - Adult: [phone]     Child: [phone]  [place] - Adult: [phone]     Child: [phone]  [place]: [phone]  List of all [facility] resources:    https://[place].gov/dhs/people-we-serve/adults/health-care/mental-health/resources/crisis-contacts.jsp    National Crisis Information:   Crisis Text Line: Text  [place]  to 276767  National Suicide Prevention Lifeline: 3-262-302-TALK (1-244.838.6298)       For online chat options, visit https://suicidepreventionlifeline.org/chat/  Poison Control Center: 3-769-428-3154  Trans Lifeline: 0-930-379-2381 - Hotline for transgender people of all ages  The Anastacio Project: 1-331-203-5617 - Hotline for LGBT youth     For Non-Emergency Support:   Fast Tracker: Mental Health & Substance Use Disorder Resources -   https://www.Patternsn.org/
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
## 2022-07-11 ENCOUNTER — VIRTUAL VISIT (OUTPATIENT)
Dept: PSYCHIATRY | Facility: CLINIC | Age: 62
End: 2022-07-11
Attending: NURSE PRACTITIONER
Payer: COMMERCIAL

## 2022-07-11 DIAGNOSIS — F43.10 PTSD (POST-TRAUMATIC STRESS DISORDER): ICD-10-CM

## 2022-07-11 PROCEDURE — 99214 OFFICE O/P EST MOD 30 MIN: CPT | Mod: 95 | Performed by: NURSE PRACTITIONER

## 2022-07-11 RX ORDER — BUSPIRONE HYDROCHLORIDE 10 MG/1
10 TABLET ORAL 2 TIMES DAILY
Qty: 180 TABLET | Refills: 0 | Status: SHIPPED | OUTPATIENT
Start: 2022-07-11 | End: 2022-10-10

## 2022-07-11 NOTE — PATIENT INSTRUCTIONS
**For crisis resources, please see the information at the end of this document**   Patient Education    Thank you for coming to the [facility] [place] [facility].     Lab Testing:  If you had lab testing today and your results are reassuring or normal they will be mailed to you or sent through Aniika within 7 days. If the lab tests need quick action we will call you with the results. The phone number we will call with results is # [phone]. If this is not the best number please call our clinic and change the number.     Medication Refills:  If you need any refills please call your pharmacy and they will contact us. Our fax number for refills is [phone].   Three business days of notice are needed for general medication refill requests.   Five business days of notice are needed for controlled substance refill requests.   If you need to change to a different pharmacy, please contact the new pharmacy directly. The new pharmacy will help you get your medications transferred.     Contact Us:  Please call [phone] during business hours (8-5:00 M-F).   If you have medication related questions after clinic hours, or on the weekend, please call [phone].     Financial Assistance [phone]   Medical Records [phone]       MENTAL HEALTH CRISIS RESOURCES:  For a emergency help, please call 911 or go to the nearest Emergency Department.     Emergency Walk-In Options:   EmPATH Unit @ [facility] ([place]): [phone] - Specialized mental health emergency area designed to be calming  [facility] ([place]): [phone]  [facility] ([place]): [phone]  [facility]): [phone]    [facility] Crisis Information:   [place]: [phone]  [place]: [phone]  [place] ([place]) - Adult: [phone]     Child: [phone]  [place] - Adult: [phone]     Child: [phone]  [place]:  [phone]  List of all [facility] resources:   https://[place].gov/dhs/people-we-serve/adults/health-care/mental-health/resources/crisis-contacts.jsp    National Crisis Information:   Crisis Text Line: Text  [place]  to 753452  National Suicide Prevention Lifeline: 4-245-396-TALK (1-525.299.6140)       For online chat options, visit https://suicidepreventionlifeline.org/chat/  Poison Control Center: 2-075-478-2816  Trans Lifeline: 2-407-080-6409 - Hotline for transgender people of all ages  The Anastacio Project: 5-618-568-4063 - Hotline for LGBT youth     For Non-Emergency Support:   Fast Tracker: Mental Health & Substance Use Disorder Resources -   https://www.Reliven.org/
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
## 2022-07-11 NOTE — NURSING NOTE
Patient denies any changes since echeck-in regarding medication and allergies and states all information entered during echeck-in remains accurate.  Hilaria SRINIVASAN

## 2022-09-10 ENCOUNTER — HOSPITAL ENCOUNTER (EMERGENCY)
Facility: CLINIC | Age: 62
Discharge: HOME | End: 2022-09-10
Attending: EMERGENCY MEDICINE | Admitting: EMERGENCY MEDICINE
Payer: COMMERCIAL

## 2022-09-10 ENCOUNTER — APPOINTMENT (OUTPATIENT)
Dept: ULTRASOUND IMAGING | Facility: CLINIC | Age: 62
End: 2022-09-10
Attending: EMERGENCY MEDICINE
Payer: COMMERCIAL

## 2022-09-10 VITALS
HEART RATE: 84 BPM | BODY MASS INDEX: 24.8 KG/M2 | SYSTOLIC BLOOD PRESSURE: 134 MMHG | RESPIRATION RATE: 16 BRPM | HEIGHT: 63 IN | DIASTOLIC BLOOD PRESSURE: 74 MMHG | TEMPERATURE: 97.2 F | WEIGHT: 140 LBS | OXYGEN SATURATION: 97 %

## 2022-09-10 DIAGNOSIS — M25.461 EFFUSION OF RIGHT KNEE: ICD-10-CM

## 2022-09-10 PROCEDURE — 93971 EXTREMITY STUDY: CPT | Mod: RT

## 2022-09-10 PROCEDURE — 99284 EMERGENCY DEPT VISIT MOD MDM: CPT | Mod: 25

## 2022-09-10 ASSESSMENT — ENCOUNTER SYMPTOMS: ARTHRALGIAS: 1

## 2022-09-10 NOTE — ED PROVIDER NOTES
"  History   Chief Complaint:  Leg Pain       HPI   Preethi Mays is a 62 year old female who presents with right calf pain and bruising that started tonight. She has ACL injury to her right leg that happened 10 day ago while playing pickle ball. She has seen [facility] and had an MRI done and plans to repair on October 27th. She has a knee immobilizer on.    Review of Systems   Musculoskeletal: Positive for arthralgias.   All other systems reviewed and are negative.    Allergies:  Keflex [Cephalexin]  Omeprazole  Penicillins  Smoke.  Sulfa Drugs    Medications:  Albuterol  Buspar  Flexeril  Periostat  Melatonin  Singulair  Maxalt  Nolvadex  Cholecalciferol  Wixela    Past Medical History:     Rosacea  Migraine   BCC  BPPV  Insomnia  TMJ  PTSD  Osteopenia  Nonrheumatic mitral valve regurgitation    Past Surgical History:    Breast surgery  Colonoscopy  Hemorrhoid surgery x2  ORIF, left wrist   [place] teeth extraction  Mohs, left lower eye    Family History:    Mother - CAD, breast cancer  Father - CAD    Social History:  The patient presents to the ED with family member.  PCP: Karlee Santa       Physical Exam     Patient Vitals for the past 24 hrs:   BP Temp Temp src Pulse Resp SpO2 Height Weight   09/10/22 0038 134/74 97.2  F (36.2  C) Temporal 84 16 97 % 1.6 m (5' 3\") 63.5 kg (140 lb)       Physical Exam  General: Sitting up in bed  Eyes:  The pupils are equal and round    Conjunctivae and sclerae are normal  ENT:    Wearing a mask  Neck:  Normal range of motion  CV:  Regular rate     Skin warm and well perfused     PT pulse 2+ on right  Resp:  Non labored breathing on room air    No tachypnea    No cough heard  MS:  Mild right knee swelling, slight decrease in ROM of right knee  Skin:  No erythema. Mild ecchymosis on right calf  Neuro:   Awake, alert.      Speech is normal and fluent.    Face is symmetric.     Moves all extremities equally    SILT on right LE  Psych: Normal affect.  Appropriate " interactions.    Emergency Department Course     Imaging:  US Lower Extremity Venous Duplex Right   Final Result   IMPRESSION:   1.  No deep venous thrombosis in the right lower extremity.      2.  Moderate to large knee joint effusion.        Report per radiology    Emergency Department Course:       Reviewed:  I reviewed nursing notes, vitals, past medical history and Care Everywhere    Assessments:  0214 I obtained history and examined the patient as noted above.     Disposition:  The patient was discharged to home.     Impression & Plan     Medical Decision Making:  The patient presents today with calf tenderness and pain of unclear etiology.  Most concerning is a possible DVT, however, Ultrasound showed no evidence of DVT and the patient is otherwise low risk for blood clots.  Vital signs are stable and I see no evidence of cellulitis, myositis, compartment syndrome, or arterial or vascular insufficiency.  Likely has swelling and ecchymosis from recent ACL injury. Has knee effusion from her ACL injury. NO evidence of septic joint on exam.    Diagnosis:    ICD-10-CM    1. Effusion of right knee  M25.461      Scribe Disclosure:  I, Shakira Bolanos, am serving as a scribe at 2:05 AM on 9/10/2022 to document services personally performed by Cathy Mishra MD based on my observations and the provider's statements to me.              Cathy Mishra MD  09/10/22 5088

## 2022-09-14 ENCOUNTER — MYC MEDICAL ADVICE (OUTPATIENT)
Dept: ONCOLOGY | Facility: CLINIC | Age: 62
End: 2022-09-14

## 2022-10-05 NOTE — PROGRESS NOTES
[facility] Medical Oncology Note       October 7, 2022  Outpatient Progress Note      Assessment:     1. Stage IA invasive ductal breast cancer, status post lumpectomy and radiotherapy, and currently on tamoxifen with continued adequate tolerance. She has some hot flashes and muscle issues, but weighing the risks and benefits, she wants to stay on the drug. Going to an AI doesn't make sense for Whit, since she has osteopenia and joint issues already. Happily, her recent bone density testing is unchanged.  2. Now, over 3 years out from the time of diagnosis with no evidence of recurrent disease by history or physical exam.  3. Other psychosocial stressors discussed at length.      Plan:       1. Continue tamoxifen 20 mg p.o. daily  2. Return to clinic in 6 months as we continue in follow-up mode      Terrance Stringer MD, MSc  Associate Professor of Medicine  [employer] [facility] [employer]  [facility]  [address] [place] [id number]  [phone]    __________________________________________________________________    Diagnosis     DIAGNOSIS:  1. Stage IA invasive ductal breast cancer, diagnosed definitively at lumpectomy and sentinel lymph node biopsy 7/9/2019.  Whit had a 1.7 cm poorly differentiated primary tumor.  It was 97% positive for the estrogen receptor with strong staining, 84% positive for the progesterone receptor with moderate staining, and negative for HER-2 amplification by IHC and FISH.  2 sentinel lymph nodes were negative for involvement.  Oncotype recurrence score was ordered and this was 20.  2. When seen in October 2021, Whit had significant pulmonary symptoms, for which we ordered at CT that was completely negative.      History of Present Illness/Therapy to Date:     1. Lumpectomy and sentinel lymph node biopsy 7/9/2019  2. Adjuvant radiotherapy 9/4 through 10/1/2019  3. Tamoxifen since 10/28/2019      Interval history:     Whit is back    She is doing  relatively well.    She recently tore her left ACL while playing pickle ball and is scheduled for repair with an allogenic ligament.    The Great Dessication continues, which she attributes to tamoxifen, though is likely due to menopausal symptoms. Weighing the risks and benefits of tamoxifen, she wants to stay on it.  She does have some charley horses at night.      Both of her parents are alive at the age of 90.  They live in [place].  Her sister is dealing with a lot of the issues there.  And, unfortunately, another sister was just diagnosed with pancreatic cancer.  She wonders if this affects her prior genetic testing and whether she needs more.    She is going to retire from her job.  It is getting to be too much.    Multiple other psychosocial stressors were discussed at length.      On ROS in addition to the above  Endorses:    Denies  fevers, chills, NS, HA, dysphagia/odynophagia, change in weight, change in appetite, cough, SOB, CP, n/v, abd pain, constipation/diarrhea, hematochezia, dysuria, hematuria, swelling, rashes, lymphadenopathy      Past Medical History:   I have reviewed this patient's past medical history   Past Medical History:   Diagnosis Date     Blepharitis      Depression      PTSD (post-traumatic stress disorder)      Rosacea      TMJ (dislocation of temporomandibular joint)           Past Surgical History:    I have reviewed this patient's past surgical history       Social History:   Tobacco, ETOH, and rec drugs reviewed and as noted below with the following exceptions:  Whit grew up in [place] and graduated from high school in 1977.  She then went to [employer] where she met her  Kevan.  They have 4 children, Nadia, Martin, Skylar, and Santo.  Kevan is a dentist.  I went to medical school with his brother, Brad.  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          Family History:     Family History   Problem Relation Age of Onset      Coronary Artery Disease Mother      Breast Cancer Mother      Coronary Artery Disease Father             Medications:     Current Outpatient Medications   Medication Sig Dispense Refill     albuterol (PROAIR HFA/PROVENTIL HFA/VENTOLIN HFA) 108 (90 Base) MCG/ACT inhaler Inhale 2 puffs into the lungs every 6 hours as needed for shortness of breath / dyspnea or wheezing 8 g 0     busPIRone (BUSPAR) 10 MG tablet Take 1 tablet (10 mg) by mouth 2 times daily 180 tablet 0     cyclobenzaprine (FLEXERIL) 5 MG tablet Take 5 mg by mouth daily as needed for muscle spasms       doxycycline (PERIOSTAT) 20 MG tablet Take 20 mg by mouth 2 times daily       [id number] 3-Phytosterols (BP VIT 3) 1 MG CAPS One capsule BID       fluorometholone (FML LIQUIFILM) 0.1 % ophthalmic suspension        ketoconazole (NIZORAL) 2 % external cream APPLY EXTERNALLY TO FEET TWICE DAILY       Melatonin 2.5 MG CAPS Take 1 tablet by mouth nightly as needed       metroNIDAZOLE (NORITATE) 1 % cream Apply topically daily       montelukast (SINGULAIR) 10 MG tablet Take 10 mg by mouth       multivitamin w/minerals (THERA-VIT-M) tablet Take 1 tablet by mouth daily       Rizatriptan Benzoate (MAXALT PO) Take 10 mg by mouth as needed for migraine       tamoxifen (NOLVADEX) 10 MG tablet TAKE 1 TABLET BY MOUTH DAILY. INCREASE AS TOLERATED OR INSTRUCTED BY DOCTOR LATRICE 90 tablet 3     Vitamin D, Cholecalciferol, 1000 units CAPS Take 2 tablets by mouth daily 100 capsule 3     WIXELA INHUB 100-50 MCG/DOSE inhaler INHALE 1 PUFF BY MOUTH TWICE DAILY                Physical Exam:   There were no vitals taken for this visit.    ECOG PS: 0  Constitutional: WDWN female in NAD, pleasant and appropriate  HEENT:  NC/AT, no icterus, OP clear, MMM  Skin: No jaundice nor ecchymoses  Lungs: CTAB, no w/r/r, nonlabored breathing  Cardiovascular: RRR, S1, S2, no m/r/g  Abdomen: +BS, soft, nontender, nondistended, no organomegaly nor masses  MSK/Extremities: Warm, well  perfused. No edema  LN: no cervical, supraclavicular, axillary, nor inguinal lymphadenopathy  Neurologic: alert, answering questions appropriately, moving all extremities spontaneously. CN 2-12 grossly intact.  Psych: appropriate affect  Breast exam: The right breast is s/p lumpectomy and RT. The nipple is flatter and there is more firmness to the parechyma than the contralateral side. There is some prominence of the breast tissue in the upper half.  There really is just minimal hyperpigmentation and skin thickening.  The right axilla is negative.  The left breast has significant fibrocystic change, and is smaller than the contralateral side.  It is a heterogeneous exam but there is no dominant mass.  The left axilla is negative.  Data:     Recent Labs   Lab Test 03/27/21  1301 10/14/18  0609   WBC 11.0 8.6   NEUTROPHIL 90.9  --    HGB 12.8 11.1*    318     Recent Labs   Lab Test 03/27/21  1301 10/14/18  0609    139   POTASSIUM 3.7 4.1   CHLORIDE 108 107   CO2 23 25   ANIONGAP 7 7   BUN 17 18   CR 0.85 0.74   RACHEL 9.0 8.1*     No results for input(s): MAG, PHOS, LDH, URIC in the last 77876 hours.  No results for input(s): BILITOTAL, ALKPHOS, ALT, AST, ALBUMIN, LDH in the last 69144 hours.    No results found for this or any previous visit (from the past 24 hour(s)).    Other data     Mammogram 7/26/2022 (reviewed)  COMPARISON FILMS: Yes   6/11/21 [facility]   5/29/20 [facility]     FINDINGS:  The breasts are heterogeneously dense, which may obscure small   masses.  No suspicious masses or microcalcifications.  Post treatment   changes within right breast    Left ankle film 9/20/22    INDICATION:  Left ankle pain.   THREE VIEWS:  There is an incomplete fracture off the lateral aspect   of the distal fibula.        Left ankle follow up film 10/4/2022  In the interval since previous films there is excellent callus   formation.         Labs, imaging and treatment plan reviewed with patient. All  questions answered.    Seen with Alphonso Castellanos MD PhD MS-2    30 minutes spent on the date of the encounter doing chart review, review of outside records, review of test results, interpretation of tests, patient visit and documentation

## 2022-10-07 ENCOUNTER — ONCOLOGY VISIT (OUTPATIENT)
Dept: ONCOLOGY | Facility: CLINIC | Age: 62
End: 2022-10-07
Attending: INTERNAL MEDICINE
Payer: COMMERCIAL

## 2022-10-07 VITALS
WEIGHT: 149.9 LBS | BODY MASS INDEX: 26.55 KG/M2 | SYSTOLIC BLOOD PRESSURE: 114 MMHG | OXYGEN SATURATION: 99 % | HEART RATE: 70 BPM | DIASTOLIC BLOOD PRESSURE: 75 MMHG | TEMPERATURE: 97.8 F

## 2022-10-07 DIAGNOSIS — R06.02 SHORTNESS OF BREATH: Primary | ICD-10-CM

## 2022-10-07 PROCEDURE — G0463 HOSPITAL OUTPT CLINIC VISIT: HCPCS

## 2022-10-07 PROCEDURE — 99214 OFFICE O/P EST MOD 30 MIN: CPT | Performed by: INTERNAL MEDICINE

## 2022-10-07 RX ORDER — CYCLOSPORINE 0.5 MG/ML
1 EMULSION OPHTHALMIC 2 TIMES DAILY
COMMUNITY

## 2022-10-07 ASSESSMENT — PAIN SCALES - GENERAL: PAINLEVEL: NO PAIN (0)

## 2022-10-07 NOTE — NURSING NOTE
"Oncology Rooming Note    October 7, 2022 10:52 AM   Preethi Mays is a 62 year old female who presents for:    Chief Complaint   Patient presents with     Oncology Clinic Visit     Malignant neoplasm of overlapping sites of right breast in female, estrogen receptor positive     Initial Vitals: /75   Pulse 70   Temp 97.8  F (36.6  C) (Oral)   Wt 68 kg (149 lb 14.4 oz)   SpO2 99%   BMI 26.55 kg/m   Estimated body mass index is 26.55 kg/m  as calculated from the following:    Height as of 9/10/22: 1.6 m (5' 3\").    Weight as of this encounter: 68 kg (149 lb 14.4 oz). Body surface area is 1.74 meters squared.  No Pain (0) Comment: Data Unavailable   No LMP recorded. Patient is postmenopausal.  Allergies reviewed: Yes  Medications reviewed: Yes    Medications: Medication refills not needed today.  Pharmacy name entered into Fitbit:    [id number] - [place], [place] - [address] [place] [address] [id number] - [place], [place] - [address] AT [facility] [address]    Clinical concerns: none       Uyen Marion"

## 2022-10-07 NOTE — LETTER
10/7/2022         RE: Preethi Mays  [address] [facility] [address]        Dear Colleague,    Thank you for referring your patient, Preethi Mays, to the [facility]. Please see a copy of my visit note below.        [facility] Medical Oncology Note       October 7, 2022  Outpatient Progress Note      Assessment:     1. Stage IA invasive ductal breast cancer, status post lumpectomy and radiotherapy, and currently on tamoxifen with continued adequate tolerance. She has some hot flashes and muscle issues, but weighing the risks and benefits, she wants to stay on the drug. Going to an AI doesn't make sense for Whit, since she has osteopenia and joint issues already. Happily, her recent bone density testing is unchanged.  2. Now, over 3 years out from the time of diagnosis with no evidence of recurrent disease by history or physical exam.  3. Other psychosocial stressors discussed at length.      Plan:       1. Continue tamoxifen 20 mg p.o. daily  2. Return to clinic in 6 months as we continue in follow-up mode      Terrance Stringer MD, MSc  Associate Professor of Medicine  [employer] [facility] [employer] [facility] [employer]  [facility]  [address] [place] [address]  [phone]    __________________________________________________________________    Diagnosis     DIAGNOSIS:  1. Stage IA invasive ductal breast cancer, diagnosed definitively at lumpectomy and sentinel lymph node biopsy 7/9/2019.  Whit had a 1.7 cm poorly differentiated primary tumor.  It was 97% positive for the estrogen receptor with strong staining, 84% positive for the progesterone receptor with moderate staining, and negative for HER-2 amplification by IHC and FISH.  2 sentinel lymph nodes were negative for involvement.  Oncotype recurrence score was ordered and this was 20.  2. When seen in October 2021, Whit had significant pulmonary symptoms, for which we ordered at CT that was  completely negative.      History of Present Illness/Therapy to Date:     1. Lumpectomy and sentinel lymph node biopsy 7/9/2019  2. Adjuvant radiotherapy 9/4 through 10/1/2019  3. Tamoxifen since 10/28/2019      Interval history:     Whit is back    She is doing relatively well.    She recently tore her left ACL while playing pickle ball and is scheduled for repair with an allogenic ligament.    The Great Dessication continues, which she attributes to tamoxifen, though is likely due to menopausal symptoms. Weighing the risks and benefits of tamoxifen, she wants to stay on it.  She does have some charley horses at night.      Both of her parents are alive at the age of 90.  They live in [place].  Her sister is dealing with a lot of the issues there.  And, unfortunately, another sister was just diagnosed with pancreatic cancer.  She wonders if this affects her prior genetic testing and whether she needs more.    She is going to retire from her job.  It is getting to be too much.    Multiple other psychosocial stressors were discussed at length.      On ROS in addition to the above  Endorses:    Denies  fevers, chills, NS, HA, dysphagia/odynophagia, change in weight, change in appetite, cough, SOB, CP, n/v, abd pain, constipation/diarrhea, hematochezia, dysuria, hematuria, swelling, rashes, lymphadenopathy      Past Medical History:   I have reviewed this patient's past medical history   Past Medical History:   Diagnosis Date     Blepharitis      Depression      PTSD (post-traumatic stress disorder)      Rosacea      TMJ (dislocation of temporomandibular joint)           Past Surgical History:    I have reviewed this patient's past surgical history       Social History:   Tobacco, ETOH, and rec drugs reviewed and as noted below with the following exceptions:  Whit grew up in [place] and graduated from high school in 1977.  She then went to [employer] where she met her  Kevan.  They have 4 children, Nadia,  Skylar Salazar, and Santo.  Kevan is a dentist.  I went to medical school with his brother, Brad.  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          Family History:     Family History   Problem Relation Age of Onset     Coronary Artery Disease Mother      Breast Cancer Mother      Coronary Artery Disease Father             Medications:     Current Outpatient Medications   Medication Sig Dispense Refill     albuterol (PROAIR HFA/PROVENTIL HFA/VENTOLIN HFA) 108 (90 Base) MCG/ACT inhaler Inhale 2 puffs into the lungs every 6 hours as needed for shortness of breath / dyspnea or wheezing 8 g 0     busPIRone (BUSPAR) 10 MG tablet Take 1 tablet (10 mg) by mouth 2 times daily 180 tablet 0     cyclobenzaprine (FLEXERIL) 5 MG tablet Take 5 mg by mouth daily as needed for muscle spasms       doxycycline (PERIOSTAT) 20 MG tablet Take 20 mg by mouth 2 times daily       [id number]-Omega 3-Phytosterols (BP VIT 3) 1 MG CAPS One capsule BID       fluorometholone (FML LIQUIFILM) 0.1 % ophthalmic suspension        ketoconazole (NIZORAL) 2 % external cream APPLY EXTERNALLY TO FEET TWICE DAILY       Melatonin 2.5 MG CAPS Take 1 tablet by mouth nightly as needed       metroNIDAZOLE (NORITATE) 1 % cream Apply topically daily       montelukast (SINGULAIR) 10 MG tablet Take 10 mg by mouth       multivitamin w/minerals (THERA-VIT-M) tablet Take 1 tablet by mouth daily       Rizatriptan Benzoate (MAXALT PO) Take 10 mg by mouth as needed for migraine       tamoxifen (NOLVADEX) 10 MG tablet TAKE 1 TABLET BY MOUTH DAILY. INCREASE AS TOLERATED OR INSTRUCTED BY DOCTOR LATRICE 90 tablet 3     Vitamin D, Cholecalciferol, 1000 units CAPS Take 2 tablets by mouth daily 100 capsule 3     WIXELA INHUB 100-50 MCG/DOSE inhaler INHALE 1 PUFF BY MOUTH TWICE DAILY                Physical Exam:   There were no vitals taken for this visit.    ECOG PS: 0  Constitutional: WDWN female in NAD, pleasant and  appropriate  HEENT:  NC/AT, no icterus, OP clear, MMM  Skin: No jaundice nor ecchymoses  Lungs: CTAB, no w/r/r, nonlabored breathing  Cardiovascular: RRR, S1, S2, no m/r/g  Abdomen: +BS, soft, nontender, nondistended, no organomegaly nor masses  MSK/Extremities: Warm, well perfused. No edema  LN: no cervical, supraclavicular, axillary, nor inguinal lymphadenopathy  Neurologic: alert, answering questions appropriately, moving all extremities spontaneously. CN 2-12 grossly intact.  Psych: appropriate affect  Breast exam: The right breast is s/p lumpectomy and RT. The nipple is flatter and there is more firmness to the parechyma than the contralateral side. There is some prominence of the breast tissue in the upper half.  There really is just minimal hyperpigmentation and skin thickening.  The right axilla is negative.  The left breast has significant fibrocystic change, and is smaller than the contralateral side.  It is a heterogeneous exam but there is no dominant mass.  The left axilla is negative.  Data:     Recent Labs   Lab Test 03/27/21  1301 10/14/18  0609   WBC 11.0 8.6   NEUTROPHIL 90.9  --    HGB 12.8 11.1*    318     Recent Labs   Lab Test 03/27/21  1301 10/14/18  0609    139   POTASSIUM 3.7 4.1   CHLORIDE 108 107   CO2 23 25   ANIONGAP 7 7   BUN 17 18   CR 0.85 0.74   RACHEL 9.0 8.1*     No results for input(s): MAG, PHOS, LDH, URIC in the last 80020 hours.  No results for input(s): BILITOTAL, ALKPHOS, ALT, AST, ALBUMIN, LDH in the last 20040 hours.    No results found for this or any previous visit (from the past 24 hour(s)).    Other data     Mammogram 7/26/2022 (reviewed)  COMPARISON FILMS: Yes   6/11/21 [facility]   5/29/20 [facility]     FINDINGS:  The breasts are heterogeneously dense, which may obscure small   masses.  No suspicious masses or microcalcifications.  Post treatment   changes within right breast    Left ankle film 9/20/22    INDICATION:  Left ankle pain.   THREE VIEWS:   There is an incomplete fracture off the lateral aspect   of the distal fibula.        Left ankle follow up film 10/4/2022  In the interval since previous films there is excellent callus   formation.         Labs, imaging and treatment plan reviewed with patient. All questions answered.    Seen with Alphonso Castellanos MD PhD MS-2      30 minutes spent on the date of the encounter doing chart review, review of outside records, review of test results, interpretation of tests, patient visit and documentation       Sincerely,    Terrance Stringer MD

## 2022-10-10 ENCOUNTER — VIRTUAL VISIT (OUTPATIENT)
Dept: PSYCHIATRY | Facility: CLINIC | Age: 62
End: 2022-10-10
Attending: NURSE PRACTITIONER
Payer: COMMERCIAL

## 2022-10-10 DIAGNOSIS — F43.10 PTSD (POST-TRAUMATIC STRESS DISORDER): ICD-10-CM

## 2022-10-10 PROCEDURE — 99214 OFFICE O/P EST MOD 30 MIN: CPT | Mod: 95 | Performed by: NURSE PRACTITIONER

## 2022-10-10 RX ORDER — BUSPIRONE HYDROCHLORIDE 10 MG/1
10 TABLET ORAL 2 TIMES DAILY
Qty: 180 TABLET | Refills: 0 | Status: SHIPPED | OUTPATIENT
Start: 2022-10-10 | End: 2023-01-09

## 2022-10-10 NOTE — PATIENT INSTRUCTIONS
**For crisis resources, please see the information at the end of this document**   Patient Education    Thank you for coming to the [facility] [place] [facility].     Lab Testing:  If you had lab testing today and your results are reassuring or normal they will be mailed to you or sent through [employer] within 7 days. If the lab tests need quick action we will call you with the results. The phone number we will call with results is # [phone]. If this is not the best number please call our clinic and change the number.     Medication Refills:  If you need any refills please call your pharmacy and they will contact us. Our fax number for refills is [phone].   Three business days of notice are needed for general medication refill requests.   Five business days of notice are needed for controlled substance refill requests.   If you need to change to a different pharmacy, please contact the new pharmacy directly. The new pharmacy will help you get your medications transferred.     Contact Us:  Please call [phone] during business hours (8-5:00 M-F).   If you have medication related questions after clinic hours, or on the weekend, please call [phone].     Financial Assistance [phone]   Medical Records [phone]       MENTAL HEALTH CRISIS RESOURCES:  For a emergency help, please call 911 or go to the nearest Emergency Department.     Emergency Walk-In Options:   EmPATH Unit @ [facility] ([place]): [phone] - Specialized mental health emergency area designed to be calming  [facility] ([place]): [phone]  [facility] ([place]): [phone]  [facility]): [phone]    [facility] Crisis Information:   [place]: [phone]  [place]: [phone]  [place] ([place]) - Adult: [phone]     Child: [phone]  [place] - Adult: [phone]     Child: [phone]  [place]:  [phone]  List of all [facility] resources:   https://[place].gov/dhs/people-we-serve/adults/health-care/mental-health/resources/crisis-contacts.jsp    National Crisis Information:   Crisis Text Line: Text  [place]  to 255406  Suicide & Crisis Lifeline: 988  National Suicide Prevention Lifeline: 6-872-720-TALK (1-175.984.4988)       For online chat options, visit https://suicidepreventionlifeline.org/chat/  Poison Control Center: 3-762-686-7281  Trans Lifeline: 9-572-473-9660 - Hotline for transgender people of all ages  The [employer] Project: 0-440-901-1208 - Hotline for LGBT youth     For Non-Emergency Support:   Fast Tracker: Mental Health & Substance Use Disorder Resources -   https://www."[employer]"ckBlockBeaconn.org/
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
## 2022-10-15 ENCOUNTER — HEALTH MAINTENANCE LETTER (OUTPATIENT)
Age: 62
End: 2022-10-15

## 2022-11-30 DIAGNOSIS — Z17.0 MALIGNANT NEOPLASM OF OVERLAPPING SITES OF RIGHT BREAST IN FEMALE, ESTROGEN RECEPTOR POSITIVE (H): ICD-10-CM

## 2022-11-30 DIAGNOSIS — C50.811 MALIGNANT NEOPLASM OF OVERLAPPING SITES OF RIGHT BREAST IN FEMALE, ESTROGEN RECEPTOR POSITIVE (H): ICD-10-CM

## 2022-11-30 RX ORDER — TAMOXIFEN CITRATE 10 MG/1
TABLET ORAL
Qty: 90 TABLET | Refills: 3 | Status: SHIPPED | OUTPATIENT
Start: 2022-11-30 | End: 2023-08-31

## 2022-11-30 NOTE — TELEPHONE ENCOUNTER
Tamoxifen refill   Last prescribing provider: Dr Stringer     Last clinic visit date: 10/7/22 DR Stringer     Recommendations for requested medication (if none, N/A): Copied from chart note 10/7/22 Dr Stringer   1. Continue tamoxifen 20 mg p.o. daily  2. Return to clinic in 6 months as we continue in follow-up mode      Any other pertinent information (if none, N/A): N/A    Refilled: Y/N, if NO, why?

## 2023-01-09 ENCOUNTER — VIRTUAL VISIT (OUTPATIENT)
Dept: PSYCHIATRY | Facility: CLINIC | Age: 63
End: 2023-01-09
Attending: NURSE PRACTITIONER
Payer: COMMERCIAL

## 2023-01-09 DIAGNOSIS — F43.10 PTSD (POST-TRAUMATIC STRESS DISORDER): ICD-10-CM

## 2023-01-09 PROCEDURE — 99213 OFFICE O/P EST LOW 20 MIN: CPT | Mod: 95 | Performed by: NURSE PRACTITIONER

## 2023-01-09 RX ORDER — BUSPIRONE HYDROCHLORIDE 10 MG/1
10 TABLET ORAL 2 TIMES DAILY
Qty: 180 TABLET | Refills: 0 | Status: SHIPPED | OUTPATIENT
Start: 2023-01-09 | End: 2023-04-10
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
## 2023-01-09 NOTE — PATIENT INSTRUCTIONS
**For crisis resources, please see the information at the end of this document**   Patient Education    Thank you for coming to the [facility] [place] [facility].     Lab Testing:  If you had lab testing today and your results are reassuring or normal they will be mailed to you or sent through [employer] within 7 days. If the lab tests need quick action we will call you with the results. The phone number we will call with results is # [phone]. If this is not the best number please call our clinic and change the number.     Medication Refills:  If you need any refills please call your pharmacy and they will contact us. Our fax number for refills is [phone].   Three business days of notice are needed for general medication refill requests.   Five business days of notice are needed for controlled substance refill requests.   If you need to change to a different pharmacy, please contact the new pharmacy directly. The new pharmacy will help you get your medications transferred.     Contact Us:  Please call [phone] during business hours (8-5:00 M-F).   If you have medication related questions after clinic hours, or on the weekend, please call [phone].     Financial Assistance [phone]   Medical Records [phone]       MENTAL HEALTH CRISIS RESOURCES:  For a emergency help, please call 911 or go to the nearest Emergency Department.     Emergency Walk-In Options:   EmPATH Unit @ [facility] ([place]): [phone] - Specialized mental health emergency area designed to be calming  [facility] ([place]): [phone]  [facility] ([place]): [phone]  [facility]): [phone]    [facility] Crisis Information:   [place]: [phone]  [place]: [phone]  [place] ([place]) - Adult: [phone]     Child: [phone]  [place] - Adult: [phone]     Child: [phone]  [place]:  [phone]  List of all [employer] resources:   https://[place].gov/dhs/people-we-serve/adults/health-care/mental-health/resources/crisis-contacts.jsp    National Crisis Information:   Crisis Text Line: Text  [place]  to 245059  Suicide & Crisis Lifeline: 988  National Suicide Prevention Lifeline: 1-904-335-TALK (1-698.627.3171)       For online chat options, visit https://suicidepreventionlifeline.org/chat/  Poison Control Center: 3-086-112-7690  Trans Lifeline: 6-825-222-1540 - Hotline for transgender people of all ages  The [employer]ast[employer] Project: 4-359-356-2531 - Hotline for LGBT youth     For Non-Emergency Support:   Fast Tracker: Mental Health & Substance Use Disorder Resources -   https://www.[employer]iz[employer]verestn.org/

## 2023-04-10 ENCOUNTER — VIRTUAL VISIT (OUTPATIENT)
Dept: PSYCHIATRY | Facility: CLINIC | Age: 63
End: 2023-04-10
Attending: NURSE PRACTITIONER
Payer: COMMERCIAL

## 2023-04-10 DIAGNOSIS — F43.10 PTSD (POST-TRAUMATIC STRESS DISORDER): ICD-10-CM

## 2023-04-10 PROCEDURE — 90833 PSYTX W PT W E/M 30 MIN: CPT | Mod: 95 | Performed by: NURSE PRACTITIONER

## 2023-04-10 PROCEDURE — 99213 OFFICE O/P EST LOW 20 MIN: CPT | Mod: 95 | Performed by: NURSE PRACTITIONER

## 2023-04-10 RX ORDER — BUSPIRONE HYDROCHLORIDE 10 MG/1
10 TABLET ORAL 2 TIMES DAILY
Qty: 180 TABLET | Refills: 0 | Status: SHIPPED | OUTPATIENT
Start: 2023-04-10 | End: 2023-07-10
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
## 2023-04-12 NOTE — PROGRESS NOTES
[facility] Medical Oncology Note       April 14, 2022  Outpatient Progress Note      Assessment:     1. Stage IA invasive ductal breast cancer, status post lumpectomy and radiotherapy, and currently on tamoxifen with continued adequate tolerance. She has some hot flashes and muscle issues, but weighing the risks and benefits, she wants to stay on the drug. Going to an AI doesn't make sense for Whit, since she has osteopenia and joint issues already. Happily, her recent bone density testing is unchanged.  2. Now, over 3 and a half  years out from the time of diagnosis with no evidence of recurrent disease by history or physical exam.  3. Other psychosocial stressors discussed at length.      Plan:       1. Continue tamoxifen 20 mg p.o. daily  2. Return to clinic in 6 months as we continue in follow-up mode  3. Next mammogram will be with Mackenzie in July 2023.      Terrance Stringer MD, MSc  Associate Professor of Medicine  [employer]  [facility]  [address] [place] [id number]  [phone]    __________________________________________________________________    Diagnosis     DIAGNOSIS:  1. Stage IA invasive ductal breast cancer, diagnosed definitively at lumpectomy and sentinel lymph node biopsy 7/9/2019.  Whit had a 1.7 cm poorly differentiated primary tumor.  It was 97% positive for the estrogen receptor with strong staining, 84% positive for the progesterone receptor with moderate staining, and negative for HER-2 amplification by IHC and FISH.  2 sentinel lymph nodes were negative for involvement.  Oncotype recurrence score was ordered and this was 20.  2. When seen in October 2021, Whit had significant pulmonary symptoms, for which we ordered at CT that was completely negative.      History of Present Illness/Therapy to Date:     1. Lumpectomy and sentinel lymph node biopsy 7/9/2019  2. Adjuvant radiotherapy 9/4 through 10/1/2019  3. Tamoxifen since  10/28/2019      Interval history:     Whit is back    She will be retiring at the end of June and is excited about that.    The tamoxifen is what it is.  She has no plans on discontinuing it.  She gets hot a lot.  She still has insomnia.    However the insomnia is getting better.  She takes some melatonin.    She is getting more into writing.  She just wrote an essay on insomnia that is being published in the [employer] of [employer].      A sister, who lives in [place], was recently diagnosed with metastatic pancreatic cancer.    Both of her parents are alive at the age of 90.  They live in [place].  Her sister is dealing with a lot of the issues there.  And, unfortunately, another sister was just diagnosed with pancreatic cancer.  She wonders if this affects her prior genetic testing and whether she needs more.    Multiple other psychosocial stressors were discussed at length.      On ROS in addition to the above  Endorses:    Denies  fevers, chills, NS, HA, dysphagia/odynophagia, change in weight, change in appetite, cough, SOB, CP, n/v, abd pain, constipation/diarrhea, hematochezia, dysuria, hematuria, swelling, rashes, lymphadenopathy      Past Medical History:   I have reviewed this patient's past medical history   Past Medical History:   Diagnosis Date     Blepharitis      Depression      PTSD (post-traumatic stress disorder)      Rosacea      TMJ (dislocation of temporomandibular joint)           Past Surgical History:    I have reviewed this patient's past surgical history       Social History:   Tobacco, ETOH, and rec drugs reviewed and as noted below with the following exceptions:  Whit grew up in [place] and graduated from high school in 1977.  She then went to [employer] where she met her  Kevan.  They have 4 children, Nadia, Martin, Skylar, and Santo.  Kevan is a dentist.  I went to medical school with his brother, Brad.  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          Family History:     Family History   Problem Relation Age of Onset     Coronary Artery Disease Mother      Breast Cancer Mother      Coronary Artery Disease Father             Medications:     Current Outpatient Medications   Medication Sig Dispense Refill     albuterol (PROAIR HFA/PROVENTIL HFA/VENTOLIN HFA) 108 (90 Base) MCG/ACT inhaler Inhale 2 puffs into the lungs every 6 hours as needed for shortness of breath / dyspnea or wheezing 8 g 0     busPIRone (BUSPAR) 10 MG tablet Take 1 tablet (10 mg) by mouth 2 times daily 180 tablet 0     cyclobenzaprine (FLEXERIL) 5 MG tablet Take 5 mg by mouth daily as needed for muscle spasms       cycloSPORINE (RESTASIS) 0.05 % ophthalmic emulsion 1 drop 2 times daily       doxycycline (PERIOSTAT) 20 MG tablet Take 20 mg by mouth 2 times daily       [id number]-Omega 3-Phytosterols (BP VIT 3) 1 MG CAPS One capsule BID       fluorometholone (FML LIQUIFILM) 0.1 % ophthalmic suspension        ketoconazole (NIZORAL) 2 % external cream APPLY EXTERNALLY TO FEET TWICE DAILY       Melatonin 2.5 MG CAPS Take 1 tablet by mouth nightly as needed       metroNIDAZOLE (NORITATE) 1 % cream Apply topically daily       multivitamin w/minerals (THERA-VIT-M) tablet Take 1 tablet by mouth daily       Rizatriptan Benzoate (MAXALT PO) Take 10 mg by mouth as needed for migraine       tamoxifen (NOLVADEX) 10 MG tablet TAKE 1 TABLET BY MOUTH DAILY. INCREASE AS TOLERATED OR INSTRUCTED BY DOCTOR LATRICE 90 tablet 3     Vitamin D, Cholecalciferol, 1000 units CAPS Take 2 tablets by mouth daily 100 capsule 3              Physical Exam:   There were no vitals taken for this visit.    ECOG PS: 0  Constitutional: WDWN female in NAD, pleasant and appropriate  HEENT:  NC/AT, no icterus, OP clear, MMM  Skin: No jaundice nor ecchymoses  Lungs: CTAB, no w/r/r, nonlabored breathing  Cardiovascular: RRR, S1, S2, no m/r/g  Abdomen: +BS, soft, nontender, nondistended, no organomegaly nor  masses  MSK/Extremities: Warm, well perfused. No edema  LN: no cervical, supraclavicular, axillary, nor inguinal lymphadenopathy  Neurologic: alert, answering questions appropriately, moving all extremities spontaneously. CN 2-12 grossly intact.  Psych: appropriate affect  Breast exam: The right breast is s/p lumpectomy and RT. The nipple is flatter and there is more firmness to the parechyma than the contralateral side. There is some prominence of the breast tissue in the upper half. The underlying pec muscle is quite firm.  There really is just minimal hyperpigmentation and skin thickening.  The right axilla is negative.  The left breast has significant fibrocystic change, and is minimally smaller than the contralateral side.  It is a heterogeneous exam but there is no dominant mass.  The left axilla is negative.  Data:     Recent Labs   Lab Test 03/27/21  1301 10/14/18  0609   WBC 11.0 8.6   NEUTROPHIL 90.9  --    HGB 12.8 11.1*    318     Recent Labs   Lab Test 03/27/21  1301 10/14/18  0609    139   POTASSIUM 3.7 4.1   CHLORIDE 108 107   CO2 23 25   ANIONGAP 7 7   BUN 17 18   CR 0.85 0.74   RACHEL 9.0 8.1*     No results for input(s): MAG, PHOS, LDH, URIC in the last 99371 hours.  No results for input(s): BILITOTAL, ALKPHOS, ALT, AST, ALBUMIN, LDH in the last 18156 hours.    No results found for this or any previous visit (from the past 24 hour(s)).    Other data     Mammogram 7/26/2022 (reviewed)  COMPARISON FILMS: Yes   6/11/21 [facility]   5/29/20 [facility]     FINDINGS:  The breasts are heterogeneously dense, which may obscure small   masses.  No suspicious masses or microcalcifications.  Post treatment   changes within right breast    Left ankle film 9/20/22    INDICATION:  Left ankle pain.   THREE VIEWS:  There is an incomplete fracture off the lateral aspect   of the distal fibula.        Left ankle follow up film 10/4/2022  In the interval since previous films there is excellent callus    formation.         Labs, imaging and treatment plan reviewed with patient. All questions answered.      30 minutes spent on the date of the encounter doing chart review, review of outside records, review of test results, interpretation of tests, patient visit and documentation

## 2023-04-14 ENCOUNTER — ONCOLOGY VISIT (OUTPATIENT)
Dept: ONCOLOGY | Facility: CLINIC | Age: 63
End: 2023-04-14
Attending: INTERNAL MEDICINE
Payer: COMMERCIAL

## 2023-04-14 VITALS
WEIGHT: 146.1 LBS | SYSTOLIC BLOOD PRESSURE: 114 MMHG | BODY MASS INDEX: 25.88 KG/M2 | HEART RATE: 70 BPM | DIASTOLIC BLOOD PRESSURE: 76 MMHG | TEMPERATURE: 97.8 F | OXYGEN SATURATION: 98 %

## 2023-04-14 DIAGNOSIS — C50.811 MALIGNANT NEOPLASM OF OVERLAPPING SITES OF RIGHT BREAST IN FEMALE, ESTROGEN RECEPTOR POSITIVE (H): Primary | ICD-10-CM

## 2023-04-14 DIAGNOSIS — Z17.0 MALIGNANT NEOPLASM OF OVERLAPPING SITES OF RIGHT BREAST IN FEMALE, ESTROGEN RECEPTOR POSITIVE (H): Primary | ICD-10-CM

## 2023-04-14 PROCEDURE — G0463 HOSPITAL OUTPT CLINIC VISIT: HCPCS | Performed by: INTERNAL MEDICINE

## 2023-04-14 PROCEDURE — 99214 OFFICE O/P EST MOD 30 MIN: CPT | Performed by: INTERNAL MEDICINE

## 2023-04-14 RX ORDER — PHENOL 1.4 %
AEROSOL, SPRAY (ML) MUCOUS MEMBRANE
COMMUNITY
Start: 2022-03-03

## 2023-04-14 RX ORDER — METRONIDAZOLE 10 MG/G
GEL TOPICAL
COMMUNITY
Start: 2022-12-18

## 2023-04-14 RX ORDER — TAMOXIFEN CITRATE 10 MG/1
20 TABLET ORAL
COMMUNITY
Start: 2022-04-25

## 2023-04-14 ASSESSMENT — PAIN SCALES - GENERAL: PAINLEVEL: NO PAIN (0)

## 2023-04-14 NOTE — NURSING NOTE
"Oncology Rooming Note    April 14, 2023 11:15 AM   Preethi Mays is a 62 year old female who presents for:    Chief Complaint   Patient presents with     Oncology Clinic Visit     Malignant neoplasm of overlapping sites of right breast     Initial Vitals: /76 (BP Location: Left arm, Patient Position: Sitting, Cuff Size: Adult Regular)   Pulse 70   Temp 97.8  F (36.6  C) (Oral)   Wt 66.3 kg (146 lb 1.6 oz)   SpO2 98%   BMI 25.88 kg/m   Estimated body mass index is 25.88 kg/m  as calculated from the following:    Height as of 9/10/22: 1.6 m (5' 3\").    Weight as of this encounter: 66.3 kg (146 lb 1.6 oz). Body surface area is 1.72 meters squared.  No Pain (0) Comment: Data Unavailable   No LMP recorded. Patient is postmenopausal.  Allergies reviewed: Yes  Medications reviewed: Yes    Medications: Medication refills not needed today.  Pharmacy name entered into [facility]:    [id number] - [place], [place] - [address] [place] [address] [id number] - [place], [place] - [address] AT [facility] [address]    Clinical concerns: Pt states that her refill dates and appt dates are slightly off from one another, wonders if we can coordinate refills to match them up.        "

## 2023-04-14 NOTE — LETTER
4/14/2023         RE: Preethi Mays  [address] [facility] [address]        Dear Colleague,    Thank you for referring your patient, Preethi Mays, to the [facility]. Please see a copy of my visit note below.        [facility] Medical Oncology Note       April 14, 2022  Outpatient Progress Note      Assessment:     Stage IA invasive ductal breast cancer, status post lumpectomy and radiotherapy, and currently on tamoxifen with continued adequate tolerance. She has some hot flashes and muscle issues, but weighing the risks and benefits, she wants to stay on the drug. Going to an AI doesn't make sense for Whit, since she has osteopenia and joint issues already. Happily, her recent bone density testing is unchanged.  Now, over 3 and a half  years out from the time of diagnosis with no evidence of recurrent disease by history or physical exam.  Other psychosocial stressors discussed at length.      Plan:       Continue tamoxifen 20 mg p.o. daily  Return to clinic in 6 months as we continue in follow-up mode  Next mammogram will be with Mackenzie in July 2023.      Terrance Stringer MD, MSc  Associate Professor of Medicine  [employer]  [facility]  [address] [place] [id number]  [phone]    __________________________________________________________________    Diagnosis     DIAGNOSIS:  Stage IA invasive ductal breast cancer, diagnosed definitively at lumpectomy and sentinel lymph node biopsy 7/9/2019.  Whit had a 1.7 cm poorly differentiated primary tumor.  It was 97% positive for the estrogen receptor with strong staining, 84% positive for the progesterone receptor with moderate staining, and negative for HER-2 amplification by IHC and FISH.  2 sentinel lymph nodes were negative for involvement.  Oncotype recurrence score was ordered and this was 20.  When seen in October 2021, Whit had significant pulmonary symptoms,  for which we ordered at CT that was completely negative.      History of Present Illness/Therapy to Date:     Lumpectomy and sentinel lymph node biopsy 7/9/2019  Adjuvant radiotherapy 9/4 through 10/1/2019  Tamoxifen since 10/28/2019      Interval history:   Whit is back  She will be retiring at the end of June and is excited about that.  The tamoxifen is what it is.  She has no plans on discontinuing it.  She gets hot a lot.  She still has insomnia.  However the insomnia is getting better.  She takes some melatonin.  She is getting more into writing.  She just wrote an essay on insomnia that is being published in the Journal of [employer].    A sister, who lives in [place], was recently diagnosed with metastatic pancreatic cancer.  Both of her parents are alive at the age of 90.  They live in [place].  Her sister is dealing with a lot of the issues there.  And, unfortunately, another sister was just diagnosed with pancreatic cancer.  She wonders if this affects her prior genetic testing and whether she needs more.  Multiple other psychosocial stressors were discussed at length.      On ROS in addition to the above  Endorses:    Denies  fevers, chills, NS, HA, dysphagia/odynophagia, change in weight, change in appetite, cough, SOB, CP, n/v, abd pain, constipation/diarrhea, hematochezia, dysuria, hematuria, swelling, rashes, lymphadenopathy      Past Medical History:   I have reviewed this patient's past medical history   Past Medical History:   Diagnosis Date    Blepharitis     Depression     PTSD (post-traumatic stress disorder)     Rosacea     TMJ (dislocation of temporomandibular joint)           Past Surgical History:    I have reviewed this patient's past surgical history       Social History:   Tobacco, ETOH, and rec drugs reviewed and as noted below with the following exceptions:  Whit grew up in [place] and graduated from high school in 1977.  She then went to [employer] where she met her  Kevan.   They have 4 children, Nadia, Martin, Skylar, and Santo.  Kevan is a dentist.  I went to medical school with his brother, Brad.  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          Family History:     Family History   Problem Relation Age of Onset    Coronary Artery Disease Mother     Breast Cancer Mother     Coronary Artery Disease Father             Medications:     Current Outpatient Medications   Medication Sig Dispense Refill    albuterol (PROAIR HFA/PROVENTIL HFA/VENTOLIN HFA) 108 (90 Base) MCG/ACT inhaler Inhale 2 puffs into the lungs every 6 hours as needed for shortness of breath / dyspnea or wheezing 8 g 0    busPIRone (BUSPAR) 10 MG tablet Take 1 tablet (10 mg) by mouth 2 times daily 180 tablet 0    cyclobenzaprine (FLEXERIL) 5 MG tablet Take 5 mg by mouth daily as needed for muscle spasms      cycloSPORINE (RESTASIS) 0.05 % ophthalmic emulsion 1 drop 2 times daily      doxycycline (PERIOSTAT) 20 MG tablet Take 20 mg by mouth 2 times daily      [id number] 3-Phytosterols (BP VIT 3) 1 MG CAPS One capsule BID      fluorometholone (FML LIQUIFILM) 0.1 % ophthalmic suspension       ketoconazole (NIZORAL) 2 % external cream APPLY EXTERNALLY TO FEET TWICE DAILY      Melatonin 2.5 MG CAPS Take 1 tablet by mouth nightly as needed      metroNIDAZOLE (NORITATE) 1 % cream Apply topically daily      multivitamin w/minerals (THERA-VIT-M) tablet Take 1 tablet by mouth daily      Rizatriptan Benzoate (MAXALT PO) Take 10 mg by mouth as needed for migraine      tamoxifen (NOLVADEX) 10 MG tablet TAKE 1 TABLET BY MOUTH DAILY. INCREASE AS TOLERATED OR INSTRUCTED BY DOCTOR LATRICE 90 tablet 3    Vitamin D, Cholecalciferol, 1000 units CAPS Take 2 tablets by mouth daily 100 capsule 3              Physical Exam:   There were no vitals taken for this visit.    ECOG PS: 0  Constitutional: WDWN female in NAD, pleasant and appropriate  HEENT:  NC/AT, no icterus, OP clear, MMM  Skin: No  jaundice nor ecchymoses  Lungs: CTAB, no w/r/r, nonlabored breathing  Cardiovascular: RRR, S1, S2, no m/r/g  Abdomen: +BS, soft, nontender, nondistended, no organomegaly nor masses  MSK/Extremities: Warm, well perfused. No edema  LN: no cervical, supraclavicular, axillary, nor inguinal lymphadenopathy  Neurologic: alert, answering questions appropriately, moving all extremities spontaneously. CN 2-12 grossly intact.  Psych: appropriate affect  Breast exam: The right breast is s/p lumpectomy and RT. The nipple is flatter and there is more firmness to the parechyma than the contralateral side. There is some prominence of the breast tissue in the upper half. The underlying pec muscle is quite firm.  There really is just minimal hyperpigmentation and skin thickening.  The right axilla is negative.  The left breast has significant fibrocystic change, and is minimally smaller than the contralateral side.  It is a heterogeneous exam but there is no dominant mass.  The left axilla is negative.  Data:     Recent Labs   Lab Test 03/27/21  1301 10/14/18  0609   WBC 11.0 8.6   NEUTROPHIL 90.9  --    HGB 12.8 11.1*    318     Recent Labs   Lab Test 03/27/21  1301 10/14/18  0609    139   POTASSIUM 3.7 4.1   CHLORIDE 108 107   CO2 23 25   ANIONGAP 7 7   BUN 17 18   CR 0.85 0.74   RACHEL 9.0 8.1*     No results for input(s): MAG, PHOS, LDH, URIC in the last 50424 hours.  No results for input(s): BILITOTAL, ALKPHOS, ALT, AST, ALBUMIN, LDH in the last 36145 hours.    No results found for this or any previous visit (from the past 24 hour(s)).    Other data     Mammogram 7/26/2022 (reviewed)  COMPARISON FILMS: Yes   6/11/21 [facility]   5/29/20 [facility]     FINDINGS:  The breasts are heterogeneously dense, which may obscure small   masses.  No suspicious masses or microcalcifications.  Post treatment   changes within right breast    Left ankle film 9/20/22    INDICATION:  Left ankle pain.   THREE VIEWS:  There is an  incomplete fracture off the lateral aspect   of the distal fibula.        Left ankle follow up film 10/4/2022  In the interval since previous films there is excellent callus   formation.         Labs, imaging and treatment plan reviewed with patient. All questions answered.      30 minutes spent on the date of the encounter doing chart review, review of outside records, review of test results, interpretation of tests, patient visit and documentation       Sincerely,        Terrance Stringer MD

## 2023-07-10 ENCOUNTER — VIRTUAL VISIT (OUTPATIENT)
Dept: PSYCHIATRY | Facility: CLINIC | Age: 63
End: 2023-07-10
Attending: NURSE PRACTITIONER
Payer: COMMERCIAL

## 2023-07-10 DIAGNOSIS — F43.10 PTSD (POST-TRAUMATIC STRESS DISORDER): ICD-10-CM

## 2023-07-10 PROCEDURE — 99213 OFFICE O/P EST LOW 20 MIN: CPT | Mod: VID | Performed by: NURSE PRACTITIONER

## 2023-07-10 RX ORDER — BUSPIRONE HYDROCHLORIDE 10 MG/1
10 TABLET ORAL 2 TIMES DAILY
Qty: 180 TABLET | Refills: 0 | Status: SHIPPED | OUTPATIENT
Start: 2023-07-10 | End: 2023-10-02
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
## 2023-07-10 NOTE — NURSING NOTE
Is the patient currently in the state of [place]? YES    Visit mode:VIDEO    If the visit is dropped, the patient can be reconnected by: VIDEO VISIT: Text to cell phone: [phone]    Will anyone else be joining the visit? NO      How would you like to obtain your AVS? MyChart    Are changes needed to the allergy or medication list? NO    Reason for visit: RECHECK

## 2023-08-31 DIAGNOSIS — C50.811 MALIGNANT NEOPLASM OF OVERLAPPING SITES OF RIGHT BREAST IN FEMALE, ESTROGEN RECEPTOR POSITIVE (H): ICD-10-CM

## 2023-08-31 DIAGNOSIS — Z17.0 MALIGNANT NEOPLASM OF OVERLAPPING SITES OF RIGHT BREAST IN FEMALE, ESTROGEN RECEPTOR POSITIVE (H): ICD-10-CM

## 2023-08-31 RX ORDER — TAMOXIFEN CITRATE 10 MG/1
TABLET ORAL
Qty: 90 TABLET | Refills: 3 | Status: SHIPPED | OUTPATIENT
Start: 2023-08-31 | End: 2024-09-06

## 2023-08-31 NOTE — TELEPHONE ENCOUNTER
Medication: Tamoxifen 10 mg tablet  Last prescribing provider: Dr. Stringer on 11/30/22    Last clinic visit date: 4/14/23 with Dr. Stringer    Any missed appointments or no-shows since last clinic visit?: no    Recommendations for requested medication (if none, N/A): NA    Next clinic visit date: 10/13/23    Any other pertinent information (if none, N/A):Pharmacy is sending in advanced refill approval request.    Pended and Routed to Provider: Dr. Terrance Stringer

## 2023-10-02 ENCOUNTER — VIRTUAL VISIT (OUTPATIENT)
Dept: PSYCHIATRY | Facility: CLINIC | Age: 63
End: 2023-10-02
Attending: NURSE PRACTITIONER
Payer: COMMERCIAL

## 2023-10-02 DIAGNOSIS — F43.10 PTSD (POST-TRAUMATIC STRESS DISORDER): ICD-10-CM

## 2023-10-02 PROCEDURE — 99213 OFFICE O/P EST LOW 20 MIN: CPT | Mod: VID | Performed by: NURSE PRACTITIONER

## 2023-10-02 RX ORDER — BUSPIRONE HYDROCHLORIDE 10 MG/1
10 TABLET ORAL 2 TIMES DAILY
Qty: 180 TABLET | Refills: 0 | Status: SHIPPED | OUTPATIENT
Start: 2023-10-02 | End: 2023-12-27
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
## 2023-10-02 NOTE — NURSING NOTE
Is the patient currently in the state of [place]? YES    Visit mode:VIDEO    If the visit is dropped, the patient can be reconnected by: VIDEO VISIT: Text to cell phone:   Telephone Information:   Mobile [phone]       Will anyone else be joining the visit? NO  (If patient encounters technical issues they should call [phone]    How would you like to obtain your AVS? MyChart    Are changes needed to the allergy or medication list? Pt stated no changes to allergies and Pt stated no med changes    Reason for visit: RANDAL HICKSF

## 2023-10-11 NOTE — PROGRESS NOTES
[facility] Medical Oncology Note       October 13, 2023  Outpatient Progress Note      Assessment:     Stage IA invasive ductal breast cancer, status post lumpectomy and radiotherapy, and currently on tamoxifen with continued adequate tolerance. She has some hot flashes and muscle issues, but weighing the risks and benefits, she wants to stay on the drug. Going to an AI doesn't make sense for Whit, since she has osteopenia and joint issues already. Happily, her recent bone density testing is unchanged.  Now, 4 years and 3 months out from the time of diagnosis with no evidence of recurrent disease by history or physical exam.  Other psychosocial stressors discussed at length.      Plan:       Continue tamoxifen 20 mg p.o. daily.  We have just 1 year to go  Return to clinic in 6 months as we continue in follow-up mode  Next mammogram will be with Mackenzie in July 2024.      Terrance Stringer MD, MSc  Associate Professor of Medicine  [employer] [employer]  [facility]  [address] [place] [id number]  [phone]    __________________________________________________________________    Diagnosis     DIAGNOSIS:  Stage IA invasive ductal breast cancer, diagnosed definitively at lumpectomy and sentinel lymph node biopsy 7/9/2019.  Whit had a 1.7 cm poorly differentiated primary tumor.  It was 97% positive for the estrogen receptor with strong staining, 84% positive for the progesterone receptor with moderate staining, and negative for HER-2 amplification by IHC and FISH.  2 sentinel lymph nodes were negative for involvement.  Oncotype recurrence score was ordered and this was 20.  When seen in October 2021, Whit had significant pulmonary symptoms, for which we ordered at CT that was completely negative.      History of Present Illness/Therapy to Date:     Lumpectomy and sentinel lymph node biopsy 7/9/2019  Adjuvant radiotherapy 9/4 through 10/1/2019  Tamoxifen since  10/28/2019      Interval history:   Whit is back  She has not had an easy goal of it lately.  Both her mother and sister  over a 1 week span a few months ago.  Her sister had metastatic pancreatic cancer.  Her mother  in her 90s of congestive heart failure.  This was discussed at length.  Whit is loving assisted.  She and her  went to [place].  They just got a new puppy named Gladys.  She is thinking about getting back into singing.  She has been doing a little writing as well.  The tamoxifen is what it is.  She has no plans on discontinuing it.  She gets hot a lot.  She no longer has insomnia.  However the insomnia is getting better.  She takes some melatonin.  Multiple other psychosocial stressors were discussed at length.  Recently saw the  at [facility] and was told her variant of unclear significance has not had any new information to change that assessment.      On ROS in addition to the above  Endorses:    Denies  fevers, chills, NS, HA, dysphagia/odynophagia, change in weight, change in appetite, cough, SOB, CP, n/v, abd pain, constipation/diarrhea, hematochezia, dysuria, hematuria, swelling, rashes, lymphadenopathy      Past Medical History:   I have reviewed this patient's past medical history   Past Medical History:   Diagnosis Date    Blepharitis     Depression     PTSD (post-traumatic stress disorder)     Rosacea     TMJ (dislocation of temporomandibular joint)           Past Surgical History:    I have reviewed this patient's past surgical history       Social History:   Tobacco, ETOH, and rec drugs reviewed and as noted below with the following exceptions:  Whit grew up in [place] and graduated from high school in .  She then went to [employer] where she met her  Kevan.  They have 4 children, Nadia, Martin, Skylar, and Santo.  Kevan is a dentist.  I went to medical school with his brother, Brad.  hWit's major in college was vocal performance.  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          Family History:     Family History   Problem Relation Age of Onset    Coronary Artery Disease Mother     Breast Cancer Mother     Coronary Artery Disease Father             Medications:     Current Outpatient Medications   Medication Sig Dispense Refill    albuterol (PROAIR HFA/PROVENTIL HFA/VENTOLIN HFA) 108 (90 Base) MCG/ACT inhaler Inhale 2 puffs into the lungs every 6 hours as needed for shortness of breath / dyspnea or wheezing 8 g 0    busPIRone (BUSPAR) 10 MG tablet Take 1 tablet (10 mg) by mouth 2 times daily 180 tablet 0    cyclobenzaprine (FLEXERIL) 5 MG tablet Take 5 mg by mouth daily as needed for muscle spasms      cycloSPORINE (RESTASIS) 0.05 % ophthalmic emulsion 1 drop 2 times daily      doxycycline (PERIOSTAT) 20 MG tablet Take 20 mg by mouth 2 times daily      [id number] 3-Phytosterols (BP VIT 3) 1 MG CAPS One capsule BID      fluorometholone (FML LIQUIFILM) 0.1 % ophthalmic suspension       ketoconazole (NIZORAL) 2 % external cream APPLY EXTERNALLY TO FEET TWICE DAILY      Melatonin 10 MG TABS tablet Take 5 mg for insomnia      Melatonin 2.5 MG CAPS Take 1 tablet by mouth nightly as needed      metroNIDAZOLE (METROGEL) 1 % external gel APPLY TOPICALLY TO FACE EVERY NIGHT AT BEDTIME      metroNIDAZOLE (NORITATE) 1 % cream Apply topically daily      multivitamin w/minerals (THERA-VIT-M) tablet Take 1 tablet by mouth daily      Rizatriptan Benzoate (MAXALT PO) Take 10 mg by mouth as needed for migraine      tamoxifen (NOLVADEX) 10 MG tablet TAKE 1 TABLET BY MOUTH DAILY. INCREASE AS TOLERATED OR INSTRUCTED BY DOCTOR LATRICE 90 tablet 3    tamoxifen (NOLVADEX) 10 MG tablet Take 20 mg by mouth      Vitamin D, Cholecalciferol, 1000 units CAPS Take 2 tablets by mouth daily 100 capsule 3              Physical Exam:   There were no vitals taken for this visit.    ECOG PS: 0  Constitutional: WDWN female in NAD, pleasant and appropriate  HEENT:  NC/AT, no  icterus, OP clear, MMM  Skin: No jaundice nor ecchymoses  Lungs: CTAB, no w/r/r, nonlabored breathing  Cardiovascular: RRR, S1, S2, no m/r/g  Abdomen: +BS, soft, nontender, nondistended, no organomegaly nor masses  MSK/Extremities: Warm, well perfused. No edema  LN: no cervical, supraclavicular, axillary, nor inguinal lymphadenopathy  Neurologic: alert, answering questions appropriately, moving all extremities spontaneously. CN 2-12 grossly intact.  Psych: appropriate affect  Breast exam: The right breast is s/p lumpectomy and RT. The nipple is flatter and there is more firmness to the parechyma than the contralateral side. There is some prominence of the breast tissue in the upper half. The underlying pec muscle is quite firm.  There really is just minimal hyperpigmentation and skin thickening.  The right axilla is negative.  The left breast has significant fibrocystic change, and is minimally smaller than the contralateral side.  It is a heterogeneous exam but there is no dominant mass.  The left axilla is negative.  Data:     Recent Labs   Lab Test 03/27/21  1301 10/14/18  0609   WBC 11.0 8.6   NEUTROPHIL 90.9  --    HGB 12.8 11.1*    318     Recent Labs   Lab Test 03/27/21  1301 10/14/18  0609    139   POTASSIUM 3.7 4.1   CHLORIDE 108 107   CO2 23 25   ANIONGAP 7 7   BUN 17 18   CR 0.85 0.74   RACHEL 9.0 8.1*     No results for input(s): MAG, PHOS, LDH, URIC in the last 84324 hours.  No results for input(s): BILITOTAL, ALKPHOS, ALT, AST, ALBUMIN, LDH in the last 91242 hours.    No results found for this or any previous visit (from the past 24 hour(s)).    Other data     Mammogram 7/27/2023  Impression      There is no radiographic evidence for malignancy.  Recommend  annual mammograms.    MAMMOGRAM ASSESSMENT:  ACR 2 Benign      Labs, imaging and treatment plan reviewed with patient. All questions answered.      30 minutes spent on the date of the encounter doing chart review, review of outside  records, review of test results, interpretation of tests, patient visit and documentation

## 2023-10-13 ENCOUNTER — ONCOLOGY VISIT (OUTPATIENT)
Dept: ONCOLOGY | Facility: CLINIC | Age: 63
End: 2023-10-13
Attending: INTERNAL MEDICINE
Payer: COMMERCIAL

## 2023-10-13 VITALS
BODY MASS INDEX: 26.04 KG/M2 | TEMPERATURE: 98.2 F | WEIGHT: 147 LBS | OXYGEN SATURATION: 99 % | HEART RATE: 65 BPM | SYSTOLIC BLOOD PRESSURE: 123 MMHG | DIASTOLIC BLOOD PRESSURE: 72 MMHG

## 2023-10-13 DIAGNOSIS — C50.811 MALIGNANT NEOPLASM OF OVERLAPPING SITES OF RIGHT BREAST IN FEMALE, ESTROGEN RECEPTOR POSITIVE (H): Primary | ICD-10-CM

## 2023-10-13 DIAGNOSIS — Z17.0 MALIGNANT NEOPLASM OF OVERLAPPING SITES OF RIGHT BREAST IN FEMALE, ESTROGEN RECEPTOR POSITIVE (H): Primary | ICD-10-CM

## 2023-10-13 PROCEDURE — 99213 OFFICE O/P EST LOW 20 MIN: CPT | Performed by: INTERNAL MEDICINE

## 2023-10-13 PROCEDURE — 99214 OFFICE O/P EST MOD 30 MIN: CPT | Performed by: INTERNAL MEDICINE

## 2023-10-13 ASSESSMENT — PAIN SCALES - GENERAL: PAINLEVEL: NO PAIN (0)

## 2023-10-13 NOTE — LETTER
10/13/2023         RE: Preethi Mays  [address] [facility] [address]        Dear Colleague,    Thank you for referring your patient, Preethi Mays, to the [facility]. Please see a copy of my visit note below.        [facility] Medical Oncology Note       October 13, 2023  Outpatient Progress Note      Assessment:     Stage IA invasive ductal breast cancer, status post lumpectomy and radiotherapy, and currently on tamoxifen with continued adequate tolerance. She has some hot flashes and muscle issues, but weighing the risks and benefits, she wants to stay on the drug. Going to an AI doesn't make sense for Whit, since she has osteopenia and joint issues already. Happily, her recent bone density testing is unchanged.  Now, 4 years and 3 months out from the time of diagnosis with no evidence of recurrent disease by history or physical exam.  Other psychosocial stressors discussed at length.      Plan:       Continue tamoxifen 20 mg p.o. daily.  We have just 1 year to go  Return to clinic in 6 months as we continue in follow-up mode  Next mammogram will be with Mackenzie in July 2024.      Terrance Stringer MD, MSc  Associate Professor of Medicine  [employer]  [facility]  [address] [place] [address]  [phone]    __________________________________________________________________    Diagnosis     DIAGNOSIS:  Stage IA invasive ductal breast cancer, diagnosed definitively at lumpectomy and sentinel lymph node biopsy 7/9/2019.  Whit had a 1.7 cm poorly differentiated primary tumor.  It was 97% positive for the estrogen receptor with strong staining, 84% positive for the progesterone receptor with moderate staining, and negative for HER-2 amplification by IHC and FISH.  2 sentinel lymph nodes were negative for involvement.  Oncotype recurrence score was ordered and this was 20.  When seen in October 2021, Whit had  significant pulmonary symptoms, for which we ordered at CT that was completely negative.      History of Present Illness/Therapy to Date:     Lumpectomy and sentinel lymph node biopsy 2019  Adjuvant radiotherapy  through 10/1/2019  Tamoxifen since 10/28/2019      Interval history:   Whit is back  She has not had an easy goal of it lately.  Both her mother and sister  over a 1 week span a few months ago.  Her sister had metastatic pancreatic cancer.  Her mother  in her 90s of congestive heart failure.  This was discussed at length.  Whit is loving [facility].  She and her  went to [place].  They just got a new puppy named Gladys.  She is thinking about getting back into singing.  She has been doing a little writing as well.  The tamoxifen is what it is.  She has no plans on discontinuing it.  She gets hot a lot.  She no longer has insomnia.  However the insomnia is getting better.  She takes some melatonin.  Multiple other psychosocial stressors were discussed at length.  Recently saw the  at [facility] and was told her variant of unclear significance has not had any new information to change that assessment.      On ROS in addition to the above  Endorses:    Denies  fevers, chills, NS, HA, dysphagia/odynophagia, change in weight, change in appetite, cough, SOB, CP, n/v, abd pain, constipation/diarrhea, hematochezia, dysuria, hematuria, swelling, rashes, lymphadenopathy      Past Medical History:   I have reviewed this patient's past medical history   Past Medical History:   Diagnosis Date    Blepharitis     Depression     PTSD (post-traumatic stress disorder)     Rosacea     TMJ (dislocation of temporomandibular joint)           Past Surgical History:    I have reviewed this patient's past surgical history       Social History:   Tobacco, ETOH, and rec drugs reviewed and as noted below with the following exceptions:  Whit grew up in [place] and graduated from high school  in 1977.  She then went to [employer] where she met her  Kevan.  They have 4 children, Nadia, Martin, Skylar, and Santo.  Kevan is a dentist.  I went to medical school with his brother, Brad.  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          Family History:     Family History   Problem Relation Age of Onset    Coronary Artery Disease Mother     Breast Cancer Mother     Coronary Artery Disease Father             Medications:     Current Outpatient Medications   Medication Sig Dispense Refill    albuterol (PROAIR HFA/PROVENTIL HFA/VENTOLIN HFA) 108 (90 Base) MCG/ACT inhaler Inhale 2 puffs into the lungs every 6 hours as needed for shortness of breath / dyspnea or wheezing 8 g 0    busPIRone (BUSPAR) 10 MG tablet Take 1 tablet (10 mg) by mouth 2 times daily 180 tablet 0    cyclobenzaprine (FLEXERIL) 5 MG tablet Take 5 mg by mouth daily as needed for muscle spasms      cycloSPORINE (RESTASIS) 0.05 % ophthalmic emulsion 1 drop 2 times daily      doxycycline (PERIOSTAT) 20 MG tablet Take 20 mg by mouth 2 times daily      [id number] 3-Phytosterols (BP VIT 3) 1 MG CAPS One capsule BID      fluorometholone (FML LIQUIFILM) 0.1 % ophthalmic suspension       ketoconazole (NIZORAL) 2 % external cream APPLY EXTERNALLY TO FEET TWICE DAILY      Melatonin 10 MG TABS tablet Take 5 mg for insomnia      Melatonin 2.5 MG CAPS Take 1 tablet by mouth nightly as needed      metroNIDAZOLE (METROGEL) 1 % external gel APPLY TOPICALLY TO FACE EVERY NIGHT AT BEDTIME      metroNIDAZOLE (NORITATE) 1 % cream Apply topically daily      multivitamin w/minerals (THERA-VIT-M) tablet Take 1 tablet by mouth daily      Rizatriptan Benzoate (MAXALT PO) Take 10 mg by mouth as needed for migraine      tamoxifen (NOLVADEX) 10 MG tablet TAKE 1 TABLET BY MOUTH DAILY. INCREASE AS TOLERATED OR INSTRUCTED BY DOCTOR LATRICE 90 tablet 3    tamoxifen (NOLVADEX) 10 MG tablet Take 20 mg by mouth       Vitamin D, Cholecalciferol, 1000 units CAPS Take 2 tablets by mouth daily 100 capsule 3              Physical Exam:   There were no vitals taken for this visit.    ECOG PS: 0  Constitutional: WDWN female in NAD, pleasant and appropriate  HEENT:  NC/AT, no icterus, OP clear, MMM  Skin: No jaundice nor ecchymoses  Lungs: CTAB, no w/r/r, nonlabored breathing  Cardiovascular: RRR, S1, S2, no m/r/g  Abdomen: +BS, soft, nontender, nondistended, no organomegaly nor masses  MSK/Extremities: Warm, well perfused. No edema  LN: no cervical, supraclavicular, axillary, nor inguinal lymphadenopathy  Neurologic: alert, answering questions appropriately, moving all extremities spontaneously. CN 2-12 grossly intact.  Psych: appropriate affect  Breast exam: The right breast is s/p lumpectomy and RT. The nipple is flatter and there is more firmness to the parechyma than the contralateral side. There is some prominence of the breast tissue in the upper half. The underlying pec muscle is quite firm.  There really is just minimal hyperpigmentation and skin thickening.  The right axilla is negative.  The left breast has significant fibrocystic change, and is minimally smaller than the contralateral side.  It is a heterogeneous exam but there is no dominant mass.  The left axilla is negative.  Data:     Recent Labs   Lab Test 03/27/21  1301 10/14/18  0609   WBC 11.0 8.6   NEUTROPHIL 90.9  --    HGB 12.8 11.1*    318     Recent Labs   Lab Test 03/27/21  1301 10/14/18  0609    139   POTASSIUM 3.7 4.1   CHLORIDE 108 107   CO2 23 25   ANIONGAP 7 7   BUN 17 18   CR 0.85 0.74   RACHEL 9.0 8.1*     No results for input(s): MAG, PHOS, LDH, URIC in the last 81793 hours.  No results for input(s): BILITOTAL, ALKPHOS, ALT, AST, ALBUMIN, LDH in the last 54852 hours.    No results found for this or any previous visit (from the past 24 hour(s)).    Other data     Mammogram 7/27/2023  Impression      There is no radiographic evidence for  malignancy.  Recommend  annual mammograms.    MAMMOGRAM ASSESSMENT:  ACR 2 Benign      Labs, imaging and treatment plan reviewed with patient. All questions answered.    30 minutes spent on the date of the encounter doing chart review, review of outside records, review of test results, interpretation of tests, patient visit and documentation

## 2023-10-13 NOTE — NURSING NOTE
"Oncology Rooming Note    October 13, 2023 11:18 AM   Preethi Mays is a 63 year old female who presents for:    Chief Complaint   Patient presents with    Oncology Clinic Visit     Breast CA     Initial Vitals: /72   Pulse 65   Temp 98.2  F (36.8  C)   Wt 66.7 kg (147 lb)   SpO2 99%   BMI 26.04 kg/m   Estimated body mass index is 26.04 kg/m  as calculated from the following:    Height as of 9/10/22: 1.6 m (5' 3\").    Weight as of this encounter: 66.7 kg (147 lb). Body surface area is 1.72 meters squared.  No Pain (0) Comment: Data Unavailable   No LMP recorded. Patient is postmenopausal.  Allergies reviewed: Yes  Medications reviewed: Yes    Medications: Medication refills not needed today.  Pharmacy [facility] entered into [facility]: [id number] [place] [address] AT [facility] [address]    Clinical concerns:        Milly Argueta              "

## 2023-10-16 ENCOUNTER — IMMUNIZATION (OUTPATIENT)
Dept: FAMILY MEDICINE | Facility: CLINIC | Age: 63
End: 2023-10-16
Payer: COMMERCIAL

## 2023-10-16 DIAGNOSIS — Z23 NEED FOR PROPHYLACTIC VACCINATION AND INOCULATION AGAINST INFLUENZA: Primary | ICD-10-CM

## 2023-10-16 PROCEDURE — 90686 IIV4 VACC NO PRSV 0.5 ML IM: CPT

## 2023-10-16 PROCEDURE — 90471 IMMUNIZATION ADMIN: CPT
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
## 2023-12-27 ENCOUNTER — VIRTUAL VISIT (OUTPATIENT)
Dept: PSYCHIATRY | Facility: CLINIC | Age: 63
End: 2023-12-27
Attending: NURSE PRACTITIONER
Payer: COMMERCIAL

## 2023-12-27 DIAGNOSIS — F43.10 PTSD (POST-TRAUMATIC STRESS DISORDER): ICD-10-CM

## 2023-12-27 PROCEDURE — 99213 OFFICE O/P EST LOW 20 MIN: CPT | Mod: VID | Performed by: NURSE PRACTITIONER

## 2023-12-27 PROCEDURE — 90833 PSYTX W PT W E/M 30 MIN: CPT | Mod: VID | Performed by: NURSE PRACTITIONER

## 2023-12-27 RX ORDER — BUSPIRONE HYDROCHLORIDE 10 MG/1
10 TABLET ORAL 2 TIMES DAILY
Qty: 180 TABLET | Refills: 0 | Status: SHIPPED | OUTPATIENT
Start: 2023-12-27 | End: 2024-02-05
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
## 2024-02-04 ASSESSMENT — ANXIETY QUESTIONNAIRES
GAD7 TOTAL SCORE: 9
1. FEELING NERVOUS, ANXIOUS, OR ON EDGE: MORE THAN HALF THE DAYS
3. WORRYING TOO MUCH ABOUT DIFFERENT THINGS: SEVERAL DAYS
GAD7 TOTAL SCORE: 9
GAD7 TOTAL SCORE: 9
5. BEING SO RESTLESS THAT IT IS HARD TO SIT STILL: NOT AT ALL
7. FEELING AFRAID AS IF SOMETHING AWFUL MIGHT HAPPEN: MORE THAN HALF THE DAYS
6. BECOMING EASILY ANNOYED OR IRRITABLE: SEVERAL DAYS
IF YOU CHECKED OFF ANY PROBLEMS ON THIS QUESTIONNAIRE, HOW DIFFICULT HAVE THESE PROBLEMS MADE IT FOR YOU TO DO YOUR WORK, TAKE CARE OF THINGS AT HOME, OR GET ALONG WITH OTHER PEOPLE: NOT DIFFICULT AT ALL
7. FEELING AFRAID AS IF SOMETHING AWFUL MIGHT HAPPEN: MORE THAN HALF THE DAYS
4. TROUBLE RELAXING: SEVERAL DAYS
8. IF YOU CHECKED OFF ANY PROBLEMS, HOW DIFFICULT HAVE THESE MADE IT FOR YOU TO DO YOUR WORK, TAKE CARE OF THINGS AT HOME, OR GET ALONG WITH OTHER PEOPLE?: NOT DIFFICULT AT ALL
2. NOT BEING ABLE TO STOP OR CONTROL WORRYING: MORE THAN HALF THE DAYS

## 2024-02-05 ENCOUNTER — VIRTUAL VISIT (OUTPATIENT)
Dept: PSYCHIATRY | Facility: CLINIC | Age: 64
End: 2024-02-05
Attending: NURSE PRACTITIONER
Payer: COMMERCIAL

## 2024-02-05 DIAGNOSIS — F43.10 PTSD (POST-TRAUMATIC STRESS DISORDER): ICD-10-CM

## 2024-02-05 PROCEDURE — 99213 OFFICE O/P EST LOW 20 MIN: CPT | Mod: 95 | Performed by: NURSE PRACTITIONER

## 2024-02-05 RX ORDER — BUSPIRONE HYDROCHLORIDE 10 MG/1
20 TABLET ORAL 2 TIMES DAILY
Qty: 360 TABLET | Refills: 0 | Status: SHIPPED | OUTPATIENT
Start: 2024-02-05 | End: 2024-05-02

## 2024-02-05 ASSESSMENT — PAIN SCALES - GENERAL: PAINLEVEL: MODERATE PAIN (5)

## 2024-02-05 NOTE — PATIENT INSTRUCTIONS
**For crisis resources, please see the information at the end of this document**   Patient Education    Thank you for coming to the [facility] [place] [facility].     Lab Testing:  If you had lab testing today and your results are reassuring or normal they will be mailed to you or sent through ITDatabase within 7 days. If the lab tests need quick action we will call you with the results. The phone number we will call with results is # [phone]. If this is not the best number please call our clinic and change the number.     Medication Refills:  If you need any refills please call your pharmacy and they will contact us. Our fax number for refills is [phone].   Three business days of notice are needed for general medication refill requests.   Five business days of notice are needed for controlled substance refill requests.   If you need to change to a different pharmacy, please contact the new pharmacy directly. The new pharmacy will help you get your medications transferred.     Contact Us:  Please call [phone] during business hours (8-5:00 M-F).   If you have medication related questions after clinic hours, or on the weekend, please call [phone].     Financial Assistance [phone]   Medical Records [phone]       MENTAL HEALTH CRISIS RESOURCES:  For a emergency help, please call 911 or go to the nearest Emergency Department.     Emergency Walk-In Options:   EmPATH Unit @ [facility] ([place]): [phone] - Specialized mental health emergency area designed to be calming  [facility] ([place]): [phone]  [facility] ([place]): [phone]  [facility]): [phone]    [facility] Crisis Information:   [place]: [phone]  [place]: [phone]  [place] ([place]) - Adult: [phone]     Child: [phone]  [place] - Adult: [phone]     Child: [phone]  [place]:  [phone]  List of all [facility] resources:   https://[place].gov/dhs/people-we-serve/adults/health-care/mental-health/resources/crisis-contacts.jsp    National Crisis Information:   Crisis Text Line: Text  [place]  to 282857  Suicide & Crisis Lifeline: 988  National Suicide Prevention Lifeline: 9-455-948-TALK (1-471.133.7837)       For online chat options, visit https://suicidepreventionlifeline.org/chat/  Poison Control Center: 7-896-798-1080  Trans Lifeline: 7-099-536-9169 - Hotline for transgender people of all ages  The [place]astacio Project: 7-408-433-2388 - Hotline for LGBT youth     For Non-Emergency Support:   Fast Tracker: Mental Health & Substance Use Disorder Resources -   https://www.[facility].org/
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
## 2024-02-05 NOTE — NURSING NOTE
Is the patient currently in the state of [place]? YES    Visit mode:VIDEO    If the visit is dropped, the patient can be reconnected by: VIDEO VISIT: Text to cell phone:   Telephone Information:   Mobile [phone]       Will anyone else be joining the visit? NO  (If patient encounters technical issues they should call [phone]    How would you like to obtain your AVS? MyChart    Are changes needed to the allergy or medication list? No  Patient denies any changes since echeck-in regarding medication and allergies and states all information entered during echeck-in remains accurate.    Reason for visit: RECHECK    Sol RUTH

## 2024-03-07 ENCOUNTER — VIRTUAL VISIT (OUTPATIENT)
Dept: PSYCHIATRY | Facility: CLINIC | Age: 64
End: 2024-03-07
Attending: NURSE PRACTITIONER
Payer: COMMERCIAL

## 2024-03-07 DIAGNOSIS — F43.10 PTSD (POST-TRAUMATIC STRESS DISORDER): Primary | ICD-10-CM

## 2024-03-07 PROCEDURE — 99213 OFFICE O/P EST LOW 20 MIN: CPT | Mod: 95 | Performed by: NURSE PRACTITIONER
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
## 2024-03-07 NOTE — NURSING NOTE
Is the patient currently in the state of [place]? YES    Visit mode:VIDEO    If the visit is dropped, the patient can be reconnected by: VIDEO VISIT: Text to cell phone:   Telephone Information:   Mobile [phone]       Will anyone else be joining the visit? NO  (If patient encounters technical issues they should call [phone]    How would you like to obtain your AVS? MyChart    Are changes needed to the allergy or medication list? Pt stated no changes to allergies and Pt stated no med changes    Reason for visit: RANDAL HICKSF

## 2024-04-09 NOTE — PROGRESS NOTES
[facility] Medical Oncology Note       April 11, 2023  Outpatient Progress Note      Assessment:     Stage IA invasive ductal breast cancer, status post lumpectomy and radiotherapy, and currently on tamoxifen with continued adequate tolerance. She has some hot flashes and muscle issues, but weighing the risks and benefits, she wants to stay on the drug. Going to an AI doesn't make sense for Whit, since she has osteopenia and joint issues already. Happily, her recent bone density testing is unchanged.  Now, approaching 5 years out from the time of diagnosis with no evidence of recurrent disease by history or physical exam.  The plan is to continue adjuvant tamoxifen for a total of five years, meaning, we will discontinue it in October of this year.  Other psychosocial stressors discussed at length.  Given the recent death of her sister from pancreatic cancer, I do think it is reasonable to see the  again.  She has not really had genetic testing since 2019.      Plan:       Continue tamoxifen 20 mg p.o. daily.  We have just 1 year to go  Return to clinic in 6 months as we continue in follow-up mode  Next mammogram will be with Mackenzie in July 2024.  Follow-up with the  at [facility] for consideration of up-to-date genetic testing.    Seen with Jami Phipps MD, PGY-4    Terrance Stringer MD, MSc  Associate Professor of Medicine  [employer]  [facility]  [address] [place] [address]  [phone]    __________________________________________________________________    Diagnosis     DIAGNOSIS:  Stage IA invasive ductal breast cancer, diagnosed definitively at lumpectomy and sentinel lymph node biopsy 7/9/2019.  Whit had a 1.7 cm poorly differentiated primary tumor.  It was 97% positive for the estrogen receptor with strong staining, 84% positive for the progesterone receptor with moderate staining, and negative for HER-2  amplification by IHC and FISH.  2 sentinel lymph nodes were negative for involvement.  Oncotype recurrence score was ordered and this was 20.  When seen in 2021, Whit had significant pulmonary symptoms, for which we ordered at CT that was completely negative.      History of Present Illness/Therapy to Date:     Lumpectomy and sentinel lymph node biopsy 2019  Adjuvant radiotherapy  through 10/1/2019  Tamoxifen since 10/28/2019      Interval history:   Whit is back  Doing pretty well these days.  Tamoxifen is barely tolerable and she is excited to get off of it in six months.  Wants to know if she needs another genetics consultation. Last was . Since then, her sister had metastatic pancreatic cancer.  Her mother  in her 90s of congestive heart failure.  This was discussed at length.  Whit is loving long-term.  They just got a new puppy named Gladys.  She is thinking about getting back into singing.  She has been doing a little writing as well.  However the insomnia is getting better.  She takes some melatonin.  Multiple other psychosocial stressors were discussed at length.        On ROS in addition to the above  Endorses:    Denies  fevers, chills, NS, HA, dysphagia/odynophagia, change in weight, change in appetite, cough, SOB, CP, n/v, abd pain, constipation/diarrhea, hematochezia, dysuria, hematuria, swelling, rashes, lymphadenopathy      Past Medical History:   I have reviewed this patient's past medical history   Past Medical History:   Diagnosis Date    Blepharitis     Depression     PTSD (post-traumatic stress disorder)     Rosacea     TMJ (dislocation of temporomandibular joint)           Past Surgical History:    I have reviewed this patient's past surgical history       Social History:   Tobacco, ETOH, and rec drugs reviewed and as noted below with the following exceptions:  Whit grew up in [place] and graduated from high school in .  She then went to [employer] where she met her   Kevan.  They have 4 children, Nadia, Martin, Skylar, and Santo.  Kevan is a dentist.  I went to medical school with his brother, Brad.  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          Family History:     Family History   Problem Relation Age of Onset    Coronary Artery Disease Mother     Breast Cancer Mother     Coronary Artery Disease Father             Medications:     Current Outpatient Medications   Medication Sig Dispense Refill    albuterol (PROAIR HFA/PROVENTIL HFA/VENTOLIN HFA) 108 (90 Base) MCG/ACT inhaler Inhale 2 puffs into the lungs every 6 hours as needed for shortness of breath / dyspnea or wheezing 8 g 0    busPIRone (BUSPAR) 10 MG tablet Take 2 tablets (20 mg) by mouth 2 times daily 360 tablet 0    cyclobenzaprine (FLEXERIL) 5 MG tablet Take 5 mg by mouth daily as needed for muscle spasms      cycloSPORINE (RESTASIS) 0.05 % ophthalmic emulsion 1 drop 2 times daily      doxycycline (PERIOSTAT) 20 MG tablet Take 20 mg by mouth 2 times daily      [id number]-Omega 3-Phytosterols (BP VIT 3) 1 MG CAPS One capsule BID      fluorometholone (FML LIQUIFILM) 0.1 % ophthalmic suspension       ketoconazole (NIZORAL) 2 % external cream APPLY EXTERNALLY TO FEET TWICE DAILY      Melatonin 10 MG TABS tablet Take 5 mg for insomnia      Melatonin 2.5 MG CAPS Take 1 tablet by mouth nightly as needed      metroNIDAZOLE (METROGEL) 1 % external gel APPLY TOPICALLY TO FACE EVERY NIGHT AT BEDTIME      metroNIDAZOLE (NORITATE) 1 % cream Apply topically daily      multivitamin w/minerals (THERA-VIT-M) tablet Take 1 tablet by mouth daily      Rizatriptan Benzoate (MAXALT PO) Take 10 mg by mouth as needed for migraine      tamoxifen (NOLVADEX) 10 MG tablet TAKE 1 TABLET BY MOUTH DAILY. INCREASE AS TOLERATED OR INSTRUCTED BY DOCTOR LATRICE Lakhani tablet 3    tamoxifen (NOLVADEX) 10 MG tablet Take 20 mg by mouth      Vitamin D, Cholecalciferol, 1000 units CAPS Take 2 tablets by mouth  daily 100 capsule 3              Physical Exam:   There were no vitals taken for this visit.    ECOG PS: 0  Constitutional: WDWN female in NAD, pleasant and appropriate  HEENT:  NC/AT, no icterus, OP clear, MMM  Skin: No jaundice nor ecchymoses  Lungs: CTAB, no w/r/r, nonlabored breathing  Cardiovascular: RRR, S1, S2, no m/r/g  Abdomen: +BS, soft, nontender, nondistended, no organomegaly nor masses  MSK/Extremities: Warm, well perfused. No edema  LN: no cervical, supraclavicular, axillary, nor inguinal lymphadenopathy  Neurologic: alert, answering questions appropriately, moving all extremities spontaneously. CN 2-12 grossly intact.  Psych: appropriate affect  Breast exam: The right breast is s/p lumpectomy and RT. The nipple is flatter and there is more firmness to the parechyma than the contralateral side. There is some prominence of the breast tissue in the upper half. The underlying pec muscle is quite firm.  There really is just minimal hyperpigmentation and skin thickening.  The right axilla is negative.  The left breast has significant fibrocystic change, and is minimally smaller than the contralateral side.  It is a heterogeneous exam but there is no dominant mass.  The left axilla is negative.  Data:     Recent Labs   Lab Test 03/27/21  1301 10/14/18  0609   WBC 11.0 8.6   NEUTROPHIL 90.9  --    HGB 12.8 11.1*    318     Recent Labs   Lab Test 03/27/21  1301 10/14/18  0609    139   POTASSIUM 3.7 4.1   CHLORIDE 108 107   CO2 23 25   ANIONGAP 7 7   BUN 17 18   CR 0.85 0.74   RACHEL 9.0 8.1*     No results for input(s): MAG, PHOS, LDH, URIC in the last 87518 hours.  No results for input(s): BILITOTAL, ALKPHOS, ALT, AST, ALBUMIN, LDH in the last 94153 hours.    No results found for this or any previous visit (from the past 24 hour(s)).    Other data     Mammogram 7/27/2023  Impression      There is no radiographic evidence for malignancy.  Recommend  annual mammograms.    MAMMOGRAM ASSESSMENT:  ACR 2  Benign      Labs, imaging and treatment plan reviewed with patient. All questions answered.      30 minutes spent on the date of the encounter doing chart review, review of outside records, review of test results, interpretation of tests, patient visit and documentation

## 2024-04-11 ENCOUNTER — ONCOLOGY VISIT (OUTPATIENT)
Dept: ONCOLOGY | Facility: CLINIC | Age: 64
End: 2024-04-11
Attending: INTERNAL MEDICINE
Payer: COMMERCIAL

## 2024-04-11 VITALS
HEART RATE: 66 BPM | WEIGHT: 147 LBS | OXYGEN SATURATION: 98 % | DIASTOLIC BLOOD PRESSURE: 71 MMHG | TEMPERATURE: 98 F | RESPIRATION RATE: 16 BRPM | BODY MASS INDEX: 26.04 KG/M2 | SYSTOLIC BLOOD PRESSURE: 113 MMHG

## 2024-04-11 DIAGNOSIS — Z17.0 MALIGNANT NEOPLASM OF OVERLAPPING SITES OF RIGHT BREAST IN FEMALE, ESTROGEN RECEPTOR POSITIVE (H): Primary | ICD-10-CM

## 2024-04-11 DIAGNOSIS — C50.811 MALIGNANT NEOPLASM OF OVERLAPPING SITES OF RIGHT BREAST IN FEMALE, ESTROGEN RECEPTOR POSITIVE (H): Primary | ICD-10-CM

## 2024-04-11 PROCEDURE — 99214 OFFICE O/P EST MOD 30 MIN: CPT | Performed by: INTERNAL MEDICINE

## 2024-04-11 PROCEDURE — 99213 OFFICE O/P EST LOW 20 MIN: CPT | Performed by: INTERNAL MEDICINE

## 2024-04-11 ASSESSMENT — PAIN SCALES - GENERAL: PAINLEVEL: NO PAIN (0)

## 2024-04-11 NOTE — NURSING NOTE
"Oncology Rooming Note    April 11, 2024 2:02 PM   Preethi Mays is a 63 year old female who presents for:    Chief Complaint   Patient presents with    Oncology Clinic Visit     Malignant neoplasm of overlapping sites of right breast in female, estrogen receptor positive     Initial Vitals: /71   Pulse 66   Temp 98  F (36.7  C) (Oral)   Resp 16   Wt 66.7 kg (147 lb)   SpO2 98%   BMI 26.04 kg/m   Estimated body mass index is 26.04 kg/m  as calculated from the following:    Height as of 9/10/22: 1.6 m (5' 3\").    Weight as of this encounter: 66.7 kg (147 lb). Body surface area is 1.72 meters squared.  No Pain (0) Comment: Data Unavailable   No LMP recorded. Patient is postmenopausal.  Allergies reviewed: Yes  Medications reviewed: Yes    Medications: Medication refills not needed today.  Pharmacy name entered into [employer]: [id number] - [facility] [address] AT [facility] [address]    Frailty Screening:   Is the patient here for a new oncology consult visit in cancer care? 2. No      Clinical concerns: None       Catrachita Mathis CMA            "

## 2024-05-02 ENCOUNTER — VIRTUAL VISIT (OUTPATIENT)
Dept: PSYCHIATRY | Facility: CLINIC | Age: 64
End: 2024-05-02
Attending: NURSE PRACTITIONER
Payer: COMMERCIAL

## 2024-05-02 DIAGNOSIS — F43.10 PTSD (POST-TRAUMATIC STRESS DISORDER): ICD-10-CM

## 2024-05-02 PROCEDURE — 99214 OFFICE O/P EST MOD 30 MIN: CPT | Mod: 95 | Performed by: NURSE PRACTITIONER

## 2024-05-02 RX ORDER — BUSPIRONE HYDROCHLORIDE 10 MG/1
20 TABLET ORAL 2 TIMES DAILY
Qty: 360 TABLET | Refills: 0 | Status: SHIPPED | OUTPATIENT
Start: 2024-05-02 | End: 2024-07-15

## 2024-05-02 ASSESSMENT — PAIN SCALES - GENERAL: PAINLEVEL: NO PAIN (1)
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
## 2024-05-02 NOTE — PATIENT INSTRUCTIONS
**For crisis resources, please see the information at the end of this document**   Patient Education    Thank you for coming to the [facility] [place] [facility].     Lab Testing:  If you had lab testing today and your results are reassuring or normal they will be mailed to you or sent through [employer] within 7 days. If the lab tests need quick action we will call you with the results. The phone number we will call with results is # [phone]. If this is not the best number please call our clinic and change the number.     Medication Refills:  If you need any refills please call your pharmacy and they will contact us. Our fax number for refills is [phone].   Three business days of notice are needed for general medication refill requests.   Five business days of notice are needed for controlled substance refill requests.   If you need to change to a different pharmacy, please contact the new pharmacy directly. The new pharmacy will help you get your medications transferred.     Contact Us:  Please call [phone] during business hours (8-5:00 M-F).   If you have medication related questions after clinic hours, or on the weekend, please call [phone].     Financial Assistance [phone]   Medical Records [phone]       MENTAL HEALTH CRISIS RESOURCES:  For a emergency help, please call 911 or go to the nearest Emergency Department.     Emergency Walk-In Options:   EmPATH Unit @ [facility] ([place]): [phone] - Specialized mental health emergency area designed to be calming  [facility] ([place]): [phone]  [facility] ([place]): [phone]  [facility]): [phone]    [facility] Crisis Information:   [place]: [phone]  [place]: [phone]  [place] ([place] - Adult: [phone]     Child: [phone]  [place] - Adult: [phone]     Child: [phone]  [place]:  [phone]  List of all [facility] resources:   https://[place].gov/dhs/people-we-serve/adults/health-care/mental-health/resources/crisis-contacts.jsp    National Crisis Information:   Crisis Text Line: Text  [place]  to 097506  Suicide & Crisis Lifeline: 988  National Suicide Prevention Lifeline: 1-590-092-TALK (1-297.883.7015)       For online chat options, visit https://suicidepreventionlifeline.org/chat/  Poison Control Center: 5-929-327-4945  Trans Lifeline: 3-332-661-4550 - Hotline for transgender people of all ages  The [employer]ast[employer] Project: 4-905-343-6093 - Hotline for LGBT youth     For Non-Emergency Support:   Fast Tracker: Mental Health & Substance Use Disorder Resources -   https://www.[employer].org/

## 2024-05-02 NOTE — NURSING NOTE
Is the patient currently in the state of [place]? YES    Visit mode:VIDEO    If the visit is dropped, the patient can be reconnected by: VIDEO VISIT: Text to cell phone:   Telephone Information:   Mobile [phone]       Will anyone else be joining the visit? NO  (If patient encounters technical issues they should call [phone])    How would you like to obtain your AVS? MyChart    Are changes needed to the allergy or medication list? No  Patient denies any changes since echeck-in regarding medication and allergies and states all information entered during echeck-in remains accurate.    Are refills needed on medications prescribed by this physician? YES    Reason for visit: RECHECK    Sol RUTH

## 2024-07-15 ENCOUNTER — VIRTUAL VISIT (OUTPATIENT)
Dept: PSYCHIATRY | Facility: CLINIC | Age: 64
End: 2024-07-15
Attending: NURSE PRACTITIONER
Payer: COMMERCIAL

## 2024-07-15 DIAGNOSIS — F43.10 PTSD (POST-TRAUMATIC STRESS DISORDER): ICD-10-CM

## 2024-07-15 PROCEDURE — 99214 OFFICE O/P EST MOD 30 MIN: CPT | Mod: 95 | Performed by: NURSE PRACTITIONER

## 2024-07-15 RX ORDER — BUSPIRONE HYDROCHLORIDE 10 MG/1
20 TABLET ORAL 2 TIMES DAILY
Qty: 360 TABLET | Refills: 0 | Status: SHIPPED | OUTPATIENT
Start: 2024-07-15

## 2024-07-15 ASSESSMENT — PAIN SCALES - GENERAL: PAINLEVEL: NO PAIN (0)

## 2024-07-15 NOTE — NURSING NOTE
Current patient location:  At Daughter's house    Is the patient currently in the state of [place]? YES    Visit mode:VIDEO    If the visit is dropped, the patient can be reconnected by: VIDEO VISIT: Text to cell phone:   Telephone Information:   Mobile [phone]       Will anyone else be joining the visit? NO  (If patient encounters technical issues they should call [phone])    How would you like to obtain your AVS? MyChart    Are changes needed to the allergy or medication list? No    Are refills needed on medications prescribed by this physician? NO    Reason for visit: RANDAL HICKSF
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
## 2024-07-15 NOTE — PATIENT INSTRUCTIONS
**For crisis resources, please see the information at the end of this document**   Patient Education    Thank you for coming to the [facility] [place] [facility].     Lab Testing:  If you had lab testing today and your results are reassuring or normal they will be mailed to you or sent through Celsense within 7 days. If the lab tests need quick action we will call you with the results. The phone number we will call with results is # [phone]. If this is not the best number please call our clinic and change the number.     Medication Refills:  If you need any refills please call your pharmacy and they will contact us. Our fax number for refills is [phone].   Three business days of notice are needed for general medication refill requests.   Five business days of notice are needed for controlled substance refill requests.   If you need to change to a different pharmacy, please contact the new pharmacy directly. The new pharmacy will help you get your medications transferred.     Contact Us:  Please call [phone] during business hours (8-5:00 M-F).   If you have medication related questions after clinic hours, or on the weekend, please call [phone].     Financial Assistance [phone]   Medical Records [phone]       MENTAL HEALTH CRISIS RESOURCES:  For a emergency help, please call 911 or go to the nearest Emergency Department.     Emergency Walk-In Options:   EmPATH Unit @ [facility] ([place]): [phone] - Specialized mental health emergency area designed to be calming  [facility] ([place]): [phone]  [facility] ([place]): [phone]  [facility]): [phone]    [facility] Crisis Information:   [place]: [phone]  [place]: [phone]  [place] ([place]) - Adult: [phone]     Child: [phone]  [place] - Adult: [phone]     Child: [phone]  [place]:  [phone]  List of all [facility] resources:   https://[place].gov/dhs/people-we-serve/adults/health-care/mental-health/resources/crisis-contacts.jsp    National Crisis Information:   Crisis Text Line: Text  [place]  to 221103  Suicide & Crisis Lifeline: 988  National Suicide Prevention Lifeline: 8-123-336-TALK (1-748.200.3532)       For online chat options, visit https://suicidepreventionlifeline.org/chat/  Poison Control Center: 4-959-720-1655  Trans Lifeline: 7-664-637-8980 - Hotline for transgender people of all ages  The Anastacio Project: 8-507-739-0967 - Hotline for LGBT youth     For Non-Emergency Support:   Fast Tracker: Mental Health & Substance Use Disorder Resources -   https://www.[employer].org/

## 2024-09-06 DIAGNOSIS — Z17.0 MALIGNANT NEOPLASM OF OVERLAPPING SITES OF RIGHT BREAST IN FEMALE, ESTROGEN RECEPTOR POSITIVE (H): ICD-10-CM

## 2024-09-06 DIAGNOSIS — C50.811 MALIGNANT NEOPLASM OF OVERLAPPING SITES OF RIGHT BREAST IN FEMALE, ESTROGEN RECEPTOR POSITIVE (H): ICD-10-CM

## 2024-09-06 RX ORDER — TAMOXIFEN CITRATE 10 MG/1
TABLET ORAL
Qty: 90 TABLET | Refills: 3 | Status: SHIPPED | OUTPATIENT
Start: 2024-09-06

## 2024-09-06 NOTE — TELEPHONE ENCOUNTER
Medication:Tamoxifen  Last prescribing provider:Dr. Stringer  Last clinic visit date: 4/11/2024 Dr. Stringer  Recommendations for requested medication:  Copied and pasted from last provider visit    Any other pertinent information:routed to last provider and RNCC

## 2024-10-09 ENCOUNTER — MYC MEDICAL ADVICE (OUTPATIENT)
Dept: PSYCHIATRY | Facility: CLINIC | Age: 64
End: 2024-10-09
Payer: COMMERCIAL

## 2024-10-14 ENCOUNTER — VIRTUAL VISIT (OUTPATIENT)
Dept: PSYCHIATRY | Facility: CLINIC | Age: 64
End: 2024-10-14
Attending: NURSE PRACTITIONER
Payer: COMMERCIAL

## 2024-10-14 DIAGNOSIS — F43.10 PTSD (POST-TRAUMATIC STRESS DISORDER): Primary | ICD-10-CM

## 2024-10-14 PROCEDURE — 99214 OFFICE O/P EST MOD 30 MIN: CPT | Mod: 95 | Performed by: NURSE PRACTITIONER

## 2024-10-14 ASSESSMENT — PAIN SCALES - GENERAL: PAINLEVEL: NO PAIN (0)

## 2024-10-14 NOTE — PATIENT INSTRUCTIONS
**For crisis resources, please see the information at the end of this document**   Patient Education    Thank you for coming to the [facility] & [facility] [place] [facility].     Lab Testing:  If you had lab testing today and your results are reassuring or normal they will be mailed to you or sent through [employer] within 7 days. If the lab tests need quick action we will call you with the results. The phone number we will call with results is # [phone]. If this is not the best number please call our clinic and change the number.     Medication Refills:  If you need any refills please call your pharmacy and they will contact us. Our fax number for refills is [phone].   Three business days of notice are needed for general medication refill requests.   Five business days of notice are needed for controlled substance refill requests.   If you need to change to a different pharmacy, please contact the new pharmacy directly. The new pharmacy will help you get your medications transferred.     Contact Us:  Please call [phone] during business hours (8-5:00 M-F).   If you have medication related questions after clinic hours, or on the weekend, please call [phone].     Financial Assistance [phone]   Medical Records [phone]       MENTAL HEALTH CRISIS RESOURCES:  For a emergency help, please call 911 or go to the nearest Emergency Department.     Emergency Walk-In Options:   EmPATH Unit @ [facility] ([place]): [phone] - Specialized mental health emergency area designed to be calming  [facility] ([place]): [phone]  [facility] ([place]): [phone]  [facility]): [phone]    [facility] Crisis Information:   [place]: [phone]  [place]: [phone]  [place] ([place]) - Adult: [phone]     Child: [phone]  [place] - Adult: [phone]     Child: [phone]  [place]:  [phone]  List of all [facility] resources:   https://[place].gov/dhs/people-we-serve/adults/health-care/mental-health/resources/crisis-contacts.jsp    National Crisis Information:   Crisis Text [place]: Text  [place]  to 061769  Suicide & Crisis Lifeline: 988  National Suicide Prevention Lifeline: 3-954-703-TALK (1-500.677.7707)       For online chat options, visit https://suicidepreventionlifeline.org/chat/  Poison Control Center: 0-547-366-9800  Trans Lifeline: 9-629-538-3325 - Hotline for transgender people of all ages  The Anastacio Project: 6-967-925-7511 - Hotline for LGBT youth     For Non-Emergency Support:   Fast Tracker: Mental Health & Substance Use Disorder Resources -   https://www.[place].org/

## 2024-10-14 NOTE — NURSING NOTE
Current patient location: [address] [address]    Is the patient currently in the state of [place]? YES    Visit mode:VIDEO    If the visit is dropped, the patient can be reconnected by: VIDEO VISIT: Text to cell phone:   Telephone Information:   Mobile [phone]       Will anyone else be joining the visit? NO  (If patient encounters technical issues they should call [phone])    Are changes needed to the allergy or medication list? Yes please remove meds flagged for removal:  -Melatonin 2.5 MG CAPS   -metroNIDAZOLE (NORITATE) 1 % cream   -tamoxifen (NOLVADEX) 10 MG tablet     Are refills needed on medications prescribed by this physician? Discuss with provider    Rooming Documentation:  Patient will complete questionnaire(s) in [facility]    Reason for visit: RECHMAURO HICKSF
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
## 2024-11-01 NOTE — PROGRESS NOTES
[facility] Medical Oncology Note       NOVEMBER 4, 2024  Outpatient Progress Note      Assessment:     Stage IA invasive ductal breast cancer, status post lumpectomy and radiotherapy, and now, s/p five years of tamoxifen.  Now, over 5 years out from the time of diagnosis with no evidence of recurrent disease by history or physical exam.  At this point, we should discontinue the tamoxifen. Whit has no issues with that!  And, because her very high chance of cure, we can go to as-needed visits.      Plan:       Discontinue tamoxifen   Continue yearly mammography through [facility]  RT with me on an as-needed basis.      Seen with Dmitry Shea MD Pgy-4    Terrance Stringer MD, MSc  Associate Professor of Medicine  [employer]  [facility]  [address] [place] [address]  [phone]    __________________________________________________________________    Diagnosis     DIAGNOSIS:  Stage IA invasive ductal breast cancer, diagnosed definitively at lumpectomy and sentinel lymph node biopsy 7/9/2019.  Whit had a 1.7 cm poorly differentiated primary tumor.  It was 97% positive for the estrogen receptor with strong staining, 84% positive for the progesterone receptor with moderate staining, and negative for HER-2 amplification by IHC and FISH.  2 sentinel lymph nodes were negative for involvement.  Oncotype recurrence score was ordered and this was 20.  When seen in October 2021, Whit had significant pulmonary symptoms, for which we ordered at CT that was completely negative.      History of Present Illness/Therapy to Date:     Lumpectomy and sentinel lymph node biopsy 7/9/2019  Adjuvant radiotherapy 9/4 through 10/1/2019  Tamoxifen since 10/28/2019      Interval history:   Whit is back  Doing great, no complaints  Excited to be done with Tamoxifen  Two granddaughters due in 2025  Looking forward to her husbands [facility]PC  Talked about cancer screenings for her  daughters      On ROS in addition to the above  Endorses:    Denies  fevers, chills, NS, HA, dysphagia/odynophagia, change in weight, change in appetite, cough, SOB, CP, n/v, abd pain, constipation/diarrhea, hematochezia, dysuria, hematuria, swelling, rashes, lymphadenopathy      Past Medical History:   I have reviewed this patient's past medical history   Past Medical History:   Diagnosis Date    Blepharitis     Depression     PTSD (post-traumatic stress disorder)     Rosacea     TMJ (dislocation of temporomandibular joint)           Past Surgical History:    I have reviewed this patient's past surgical history       Social History:   Tobacco, ETOH, and rec drugs reviewed and as noted below with the following exceptions:  Whit grew up in [place] and graduated from high school in 1977.  She then went to [employer] where she met her  Kevan.  They have 4 children, Nadia, Martin, Skylar, and Santo.  Kevan is a dentist.  I went to medical school with his brother, Brad.  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          Family History:     Family History   Problem Relation Age of Onset    Coronary Artery Disease Mother     Breast Cancer Mother     Coronary Artery Disease Father             Medications:     Current Outpatient Medications   Medication Sig Dispense Refill    albuterol (PROAIR HFA/PROVENTIL HFA/VENTOLIN HFA) 108 (90 Base) MCG/ACT inhaler Inhale 2 puffs into the lungs every 6 hours as needed for shortness of breath / dyspnea or wheezing 8 g 0    busPIRone (BUSPAR) 10 MG tablet Take 2 tablets (20 mg) by mouth 2 times daily 360 tablet 0    cyclobenzaprine (FLEXERIL) 5 MG tablet Take 5 mg by mouth daily as needed for muscle spasms      cycloSPORINE (RESTASIS) 0.05 % ophthalmic emulsion 1 drop 2 times daily      doxycycline (PERIOSTAT) 20 MG tablet Take 20 mg by mouth 2 times daily      PW-V1-N76-Omega 3-Phytosterols (BP VIT 3) 1 MG CAPS One capsule BID       fluorometholone (FML LIQUIFILM) 0.1 % ophthalmic suspension       ketoconazole (NIZORAL) 2 % external cream APPLY EXTERNALLY TO FEET TWICE DAILY      Melatonin 10 MG TABS tablet Take 5 mg for insomnia      Melatonin 2.5 MG CAPS Take 1 tablet by mouth nightly as needed      metroNIDAZOLE (METROGEL) 1 % external gel APPLY TOPICALLY TO FACE EVERY NIGHT AT BEDTIME      metroNIDAZOLE (NORITATE) 1 % cream Apply topically daily      multivitamin w/minerals (THERA-VIT-M) tablet Take 1 tablet by mouth daily      Rizatriptan Benzoate (MAXALT PO) Take 10 mg by mouth as needed for migraine      tamoxifen (NOLVADEX) 10 MG tablet TAKE 1 TABLET BY MOUTH DAILY. INCREASE AS TOLERATED OR INSTRUCTED BY DOCTOR POLLARD 90 tablet 3    tamoxifen (NOLVADEX) 10 MG tablet Take 20 mg by mouth      Vitamin D, Cholecalciferol, 1000 units CAPS Take 2 tablets by mouth daily 100 capsule 3              Physical Exam:   There were no vitals taken for this visit.    ECOG PS: 0  Constitutional: WDWN female in NAD, pleasant and appropriate  HEENT:  NC/AT, no icterus, OP clear, MMM  Skin: No jaundice nor ecchymoses  Lungs: CTAB, no w/r/r, nonlabored breathing  Cardiovascular: RRR, S1, S2, no m/r/g  Abdomen: +BS, soft, nontender, nondistended, no organomegaly nor masses  MSK/Extremities: Warm, well perfused. No edema  LN: no cervical, supraclavicular, axillary, nor inguinal lymphadenopathy  Neurologic: alert, answering questions appropriately, moving all extremities spontaneously. CN 2-12 grossly intact.  Psych: appropriate affect  Breast exam: The right breast is s/p lumpectomy and RT. The nipple is flatter and there is more firmness to the parechyma than the contralateral side. There is some prominence of the breast tissue in the upper half. The underlying pec muscle is quite firm.  There really is just minimal hyperpigmentation and skin thickening.  The right axilla is negative.  The left breast has significant fibrocystic change, and is minimally  smaller than the contralateral side.  It is a heterogeneous exam but there is no dominant mass.  The left axilla is negative.  Data:     Recent Labs   Lab Test 03/27/21  1301 10/14/18  0609   WBC 11.0 8.6   NEUTROPHIL 90.9  --    HGB 12.8 11.1*    318     Recent Labs   Lab Test 03/27/21  1301 10/14/18  0609    139   POTASSIUM 3.7 4.1   CHLORIDE 108 107   CO2 23 25   ANIONGAP 7 7   BUN 17 18   CR 0.85 0.74   RACHEL 9.0 8.1*     No results for input(s): MAG, PHOS, LDH, URIC in the last 47216 hours.  No results for input(s): BILITOTAL, ALKPHOS, ALT, AST, ALBUMIN, LDH in the last 38838 hours.    No results found for this or any previous visit (from the past 24 hours).    Other data     Mammogram 8/1/2024  Impression      There is no radiographic evidence for malignancy.  Recommend  annual mammograms.    MAMMOGRAM ASSESSMENT:  ACR 2 Benign      Labs, imaging and treatment plan reviewed with patient. All questions answered.      30 minutes spent on the date of the encounter doing chart review, review of outside records, review of test results, interpretation of tests, patient visit and documentation

## 2024-11-04 ENCOUNTER — ONCOLOGY VISIT (OUTPATIENT)
Dept: ONCOLOGY | Facility: CLINIC | Age: 64
End: 2024-11-04
Attending: INTERNAL MEDICINE
Payer: COMMERCIAL

## 2024-11-04 VITALS
OXYGEN SATURATION: 99 % | WEIGHT: 144.8 LBS | DIASTOLIC BLOOD PRESSURE: 76 MMHG | TEMPERATURE: 98.5 F | HEART RATE: 77 BPM | BODY MASS INDEX: 25.65 KG/M2 | SYSTOLIC BLOOD PRESSURE: 119 MMHG | RESPIRATION RATE: 16 BRPM

## 2024-11-04 DIAGNOSIS — Z17.0 MALIGNANT NEOPLASM OF OVERLAPPING SITES OF RIGHT BREAST IN FEMALE, ESTROGEN RECEPTOR POSITIVE (H): Primary | ICD-10-CM

## 2024-11-04 DIAGNOSIS — C50.811 MALIGNANT NEOPLASM OF OVERLAPPING SITES OF RIGHT BREAST IN FEMALE, ESTROGEN RECEPTOR POSITIVE (H): Primary | ICD-10-CM

## 2024-11-04 PROCEDURE — G0463 HOSPITAL OUTPT CLINIC VISIT: HCPCS | Performed by: INTERNAL MEDICINE

## 2024-11-04 PROCEDURE — 99214 OFFICE O/P EST MOD 30 MIN: CPT | Performed by: INTERNAL MEDICINE

## 2024-11-04 ASSESSMENT — PAIN SCALES - GENERAL: PAINLEVEL_OUTOF10: NO PAIN (0)

## 2024-11-04 NOTE — LETTER
11/4/2024      Preethi Mays  [address] [facility] [address]      Dear Colleague,    Thank you for referring your patient, Preethi Mays, to the [facility] CANCER [facility]. Please see a copy of my visit note below.        [facility] Medical Oncology Note       NOVEMBER 4, 2024  Outpatient Progress Note      Assessment:     Stage IA invasive ductal breast cancer, status post lumpectomy and radiotherapy, and now, s/p five years of tamoxifen.  Now, over 5 years out from the time of diagnosis with no evidence of recurrent disease by history or physical exam.  At this point, we should discontinue the tamoxifen. Whit has no issues with that!  And, because her very high chance of cure, we can go to as-needed visits.      Plan:       Discontinue tamoxifen   Continue yearly mammography through [facility]  RTC with me on an as-needed basis.      Seen with Dmitry Shea MD Pgy-4    Terrance Stringer MD, MSc  Associate Professor of Medicine  [employer]  [facility] [address] [address]  [phone]    __________________________________________________________________    Diagnosis     DIAGNOSIS:  Stage IA invasive ductal breast cancer, diagnosed definitively at lumpectomy and sentinel lymph node biopsy 7/9/2019.  Whit had a 1.7 cm poorly differentiated primary tumor.  It was 97% positive for the estrogen receptor with strong staining, 84% positive for the progesterone receptor with moderate staining, and negative for HER-2 amplification by IHC and FISH.  2 sentinel lymph nodes were negative for involvement.  Oncotype recurrence score was ordered and this was 20.  When seen in October 2021, Whit had significant pulmonary symptoms, for which we ordered at CT that was completely negative.      History of Present Illness/Therapy to Date:     Lumpectomy and sentinel lymph node biopsy 7/9/2019  Adjuvant radiotherapy 9/4 through  10/1/2019  Tamoxifen since 10/28/2019      Interval history:   Whit is back  Doing great, no complaints  Excited to be done with Tamoxifen  Two granddaughters due in 2025  Looking forward to her husbands [facility]  Talked about cancer screenings for her daughters      On ROS in addition to the above  Endorses:    Denies  fevers, chills, NS, HA, dysphagia/odynophagia, change in weight, change in appetite, cough, SOB, CP, n/v, abd pain, constipation/diarrhea, hematochezia, dysuria, hematuria, swelling, rashes, lymphadenopathy      Past Medical History:   I have reviewed this patient's past medical history   Past Medical History:   Diagnosis Date     Blepharitis      Depression      PTSD (post-traumatic stress disorder)      Rosacea      TMJ (dislocation of temporomandibular joint)           Past Surgical History:    I have reviewed this patient's past surgical history       Social History:   Tobacco, ETOH, and rec drugs reviewed and as noted below with the following exceptions:  Whit grew up in [place] and graduated from high school in 1977.  She then went to [employer] where she met her  Kevan.  They have 4 children, Nadia, Martin, Skylar, and Santo.  Kevan is a dentist.  I went to medical school with his brother, Brad.  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          Family History:     Family History   Problem Relation Age of Onset     Coronary Artery Disease Mother      Breast Cancer Mother      Coronary Artery Disease Father             Medications:     Current Outpatient Medications   Medication Sig Dispense Refill     albuterol (PROAIR HFA/PROVENTIL HFA/VENTOLIN HFA) 108 (90 Base) MCG/ACT inhaler Inhale 2 puffs into the lungs every 6 hours as needed for shortness of breath / dyspnea or wheezing 8 g 0     busPIRone (BUSPAR) 10 MG tablet Take 2 tablets (20 mg) by mouth 2 times daily 360 tablet 0     cyclobenzaprine (FLEXERIL) 5 MG tablet Take 5 mg by mouth  daily as needed for muscle spasms       cycloSPORINE (RESTASIS) 0.05 % ophthalmic emulsion 1 drop 2 times daily       doxycycline (PERIOSTAT) 20 MG tablet Take 20 mg by mouth 2 times daily       [id number] 3-Phytosterols (BP VIT 3) 1 MG CAPS One capsule BID       fluorometholone (FML LIQUIFILM) 0.1 % ophthalmic suspension        ketoconazole (NIZORAL) 2 % external cream APPLY EXTERNALLY TO FEET TWICE DAILY       Melatonin 10 MG TABS tablet Take 5 mg for insomnia       Melatonin 2.5 MG CAPS Take 1 tablet by mouth nightly as needed       metroNIDAZOLE (METROGEL) 1 % external gel APPLY TOPICALLY TO FACE EVERY NIGHT AT BEDTIME       metroNIDAZOLE (NORITATE) 1 % cream Apply topically daily       multivitamin w/minerals (THERA-VIT-M) tablet Take 1 tablet by mouth daily       Rizatriptan Benzoate (MAXALT PO) Take 10 mg by mouth as needed for migraine       tamoxifen (NOLVADEX) 10 MG tablet TAKE 1 TABLET BY MOUTH DAILY. INCREASE AS TOLERATED OR INSTRUCTED BY DOCTOR POLLARD 90 tablet 3     tamoxifen (NOLVADEX) 10 MG tablet Take 20 mg by mouth       Vitamin D, Cholecalciferol, 1000 units CAPS Take 2 tablets by mouth daily 100 capsule 3              Physical Exam:   There were no vitals taken for this visit.    ECOG PS: 0  Constitutional: WDWN female in NAD, pleasant and appropriate  HEENT:  NC/AT, no icterus, OP clear, MMM  Skin: No jaundice nor ecchymoses  Lungs: CTAB, no w/r/r, nonlabored breathing  Cardiovascular: RRR, S1, S2, no m/r/g  Abdomen: +BS, soft, nontender, nondistended, no organomegaly nor masses  MSK/Extremities: Warm, well perfused. No edema  LN: no cervical, supraclavicular, axillary, nor inguinal lymphadenopathy  Neurologic: alert, answering questions appropriately, moving all extremities spontaneously. CN 2-12 grossly intact.  Psych: appropriate affect  Breast exam: The right breast is s/p lumpectomy and RT. The nipple is flatter and there is more firmness to the parechyma than the contralateral side.  There is some prominence of the breast tissue in the upper half. The underlying pec muscle is quite firm.  There really is just minimal hyperpigmentation and skin thickening.  The right axilla is negative.  The left breast has significant fibrocystic change, and is minimally smaller than the contralateral side.  It is a heterogeneous exam but there is no dominant mass.  The left axilla is negative.  Data:     Recent Labs   Lab Test 03/27/21  1301 10/14/18  0609   WBC 11.0 8.6   NEUTROPHIL 90.9  --    HGB 12.8 11.1*    318     Recent Labs   Lab Test 03/27/21  1301 10/14/18  0609    139   POTASSIUM 3.7 4.1   CHLORIDE 108 107   CO2 23 25   ANIONGAP 7 7   BUN 17 18   CR 0.85 0.74   RAHCEL 9.0 8.1*     No results for input(s): MAG, PHOS, LDH, URIC in the last 82374 hours.  No results for input(s): BILITOTAL, ALKPHOS, ALT, AST, ALBUMIN, LDH in the last 86289 hours.    No results found for this or any previous visit (from the past 24 hours).    Other data     Mammogram 8/1/2024  Impression      There is no radiographic evidence for malignancy.  Recommend  annual mammograms.    MAMMOGRAM ASSESSMENT:  ACR 2 Benign      Labs, imaging and treatment plan reviewed with patient. All questions answered.      30 minutes spent on the date of the encounter doing chart review, review of outside records, review of test results, interpretation of tests, patient visit and documentation           Again, thank you for allowing me to participate in the care of your patient.        Sincerely,        Terrance Stringer MD

## 2024-11-04 NOTE — NURSING NOTE
"Oncology Rooming Note    November 4, 2024 11:32 AM   Preethi Mays is a 64 year old female who presents for:    Chief Complaint   Patient presents with    Oncology Clinic Visit    Breast Cancer     Malignant neoplasm of overlapping sites of right breast in female, estrogen receptor positive     Initial Vitals: /76 (BP Location: Right arm, Patient Position: Sitting, Cuff Size: Adult Regular)   Pulse 77   Temp 98.5  F (36.9  C) (Oral)   Resp 16   Wt 65.7 kg (144 lb 12.8 oz)   SpO2 99%   BMI 25.65 kg/m   Estimated body mass index is 25.65 kg/m  as calculated from the following:    Height as of 9/10/22: 1.6 m (5' 3\").    Weight as of this encounter: 65.7 kg (144 lb 12.8 oz). Body surface area is 1.71 meters squared.  No Pain (0) Comment: Data Unavailable   No LMP recorded. Patient is postmenopausal.  Allergies reviewed: Yes  Medications reviewed: Yes    Medications: Medication refills not needed today.  Pharmacy name entered into "[employer]: [id number] - [facility] [address] AT [facility] [address]    Frailty Screening:   Is the patient here for a new oncology consult visit in cancer care? 2. No      Clinical concerns: Pt reports no new concerns today.      Monique Fuentes, EMT     "

## 2024-11-13 DIAGNOSIS — F43.10 PTSD (POST-TRAUMATIC STRESS DISORDER): ICD-10-CM

## 2024-11-13 RX ORDER — BUSPIRONE HYDROCHLORIDE 10 MG/1
20 TABLET ORAL 2 TIMES DAILY
Qty: 360 TABLET | Refills: 0 | Status: SHIPPED | OUTPATIENT
Start: 2024-11-13

## 2025-01-08 ENCOUNTER — VIRTUAL VISIT (OUTPATIENT)
Dept: PSYCHIATRY | Facility: CLINIC | Age: 65
End: 2025-01-08
Attending: NURSE PRACTITIONER
Payer: COMMERCIAL

## 2025-01-08 DIAGNOSIS — F43.10 PTSD (POST-TRAUMATIC STRESS DISORDER): ICD-10-CM

## 2025-01-08 RX ORDER — BUSPIRONE HYDROCHLORIDE 10 MG/1
20 TABLET ORAL 2 TIMES DAILY
Qty: 360 TABLET | Refills: 0 | Status: SHIPPED | OUTPATIENT
Start: 2025-01-08

## 2025-01-08 ASSESSMENT — PAIN SCALES - GENERAL: PAINLEVEL_OUTOF10: NO PAIN (0)

## 2025-01-08 NOTE — PATIENT INSTRUCTIONS
**For crisis resources, please see the information at the end of this document**   Patient Education    Thank you for coming to the [facility]ENT[facility] [place] [facility].     Lab Testing:  If you had lab testing today and your results are reassuring or normal they will be mailed to you or sent through [employer] within 7 days. If the lab tests need quick action we will call you with the results. The phone number we will call with results is # [phone]. If this is not the best number please call our clinic and change the number.     Medication Refills:  If you need any refills please call your pharmacy and they will contact us. Our fax number for refills is [phone].   Three business days of notice are needed for general medication refill requests.   Five business days of notice are needed for controlled substance refill requests.   If you need to change to a different pharmacy, please contact the new pharmacy directly. The new pharmacy will help you get your medications transferred.     Contact Us:  Please call [phone] during business hours (8-5:00 M-F).   If you have medication related questions after clinic hours, or on the weekend, please call [phone].     Financial Assistance [phone]   Medical Records [phone]       MENTAL HEALTH CRISIS RESOURCES:  For a emergency help, please call 911 or go to the nearest Emergency Department.     Emergency Walk-In Options:   Em[facility] Unit @ [facility] ([place]): [phone] - Specialized mental health emergency area designed to be calming  [facility] ([place]): [phone]  [facility] ([place]): [phone]  [facility]): [phone]    [facility] Crisis Information:   [place]: [phone]  [place]: [phone]  [place] ([place] - Adult: [phone]     Child: [phone]  [place] - Adult: [phone]     Child: [phone]  [place]:  [phone]  List of all [facility] resources:   https://[place].gov/dhs/people-we-serve/adults/health-care/mental-health/resources/crisis-contacts.jsp    National Crisis Information:   Crisis Text Line: Text  [place]  to 550754  Suicide & Crisis Lifeline: 988  National Suicide Prevention Lifeline: 1-397-917-TALK (1-151.502.5274)       For online chat options, visit https://suicidepreventionlifeline.org/chat/  Poison Control Center: 4-588-334-5431  Trans Lifeline: 0-610-261-9276 - Hotline for transgender people of all ages  The Anastacio Project: 6-102-462-5090 - Hotline for LGBT youth     For Non-Emergency Support:   Fast Tracker: Mental Health & Substance Use Disorder Resources -   https://www.CaremergeckTopmissionn.org/
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
## 2025-01-08 NOTE — NURSING NOTE
Current patient location: [address] [place] [id number]    Is the patient currently in the state of [place]? YES    Visit mode:VIDEO    If the visit is dropped, the patient can be reconnected by:VIDEO VISIT: Text to cell phone:   Telephone Information:   Mobile [phone]       Will anyone else be joining the visit? NO  (If patient encounters technical issues they should call [phone]    Are changes needed to the allergy or medication list? No    Are refills needed on medications prescribed by this physician? NO    Rooming Documentation:  Questionnaire(s) completed    Reason for visit: RECHECK    Jaspal HICKSF

## 2025-02-12 ENCOUNTER — MYC MEDICAL ADVICE (OUTPATIENT)
Dept: PSYCHIATRY | Facility: CLINIC | Age: 65
End: 2025-02-12
Payer: COMMERCIAL
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
## 2025-02-13 ENCOUNTER — HOSPITAL ENCOUNTER (EMERGENCY)
Facility: CLINIC | Age: 65
Discharge: HOME | End: 2025-02-13
Attending: EMERGENCY MEDICINE | Admitting: EMERGENCY MEDICINE
Payer: COMMERCIAL

## 2025-02-13 ENCOUNTER — TELEPHONE (OUTPATIENT)
Dept: BEHAVIORAL HEALTH | Facility: CLINIC | Age: 65
End: 2025-02-13
Payer: COMMERCIAL

## 2025-02-13 VITALS
OXYGEN SATURATION: 98 % | WEIGHT: 145.1 LBS | DIASTOLIC BLOOD PRESSURE: 80 MMHG | HEIGHT: 64 IN | HEART RATE: 95 BPM | RESPIRATION RATE: 18 BRPM | SYSTOLIC BLOOD PRESSURE: 136 MMHG | BODY MASS INDEX: 24.77 KG/M2 | TEMPERATURE: 97.9 F

## 2025-02-13 DIAGNOSIS — F32.A DEPRESSION WITH SUICIDAL IDEATION: ICD-10-CM

## 2025-02-13 DIAGNOSIS — F43.10 PTSD (POST-TRAUMATIC STRESS DISORDER): ICD-10-CM

## 2025-02-13 DIAGNOSIS — F43.0 ACUTE REACTION TO SITUATIONAL STRESS: ICD-10-CM

## 2025-02-13 DIAGNOSIS — R45.851 DEPRESSION WITH SUICIDAL IDEATION: ICD-10-CM

## 2025-02-13 DIAGNOSIS — F41.1 GAD (GENERALIZED ANXIETY DISORDER): ICD-10-CM

## 2025-02-13 DIAGNOSIS — F43.10 POSTTRAUMATIC STRESS DISORDER: Primary | ICD-10-CM

## 2025-02-13 PROCEDURE — 99284 EMERGENCY DEPT VISIT MOD MDM: CPT | Mod: 95 | Performed by: NURSE PRACTITIONER

## 2025-02-13 PROCEDURE — 99284 EMERGENCY DEPT VISIT MOD MDM: CPT

## 2025-02-13 RX ORDER — ESCITALOPRAM OXALATE 10 MG/1
10 TABLET ORAL DAILY
Qty: 30 TABLET | Refills: 0 | Status: SHIPPED | OUTPATIENT
Start: 2025-02-13 | End: 2025-02-17

## 2025-02-13 RX ORDER — TRAZODONE HYDROCHLORIDE 50 MG/1
25-50 TABLET ORAL AT BEDTIME
Qty: 30 TABLET | Refills: 0 | Status: SHIPPED | OUTPATIENT
Start: 2025-02-13 | End: 2025-02-17

## 2025-02-13 ASSESSMENT — ACTIVITIES OF DAILY LIVING (ADL)
ADLS_ACUITY_SCORE: 43

## 2025-02-13 ASSESSMENT — COLUMBIA-SUICIDE SEVERITY RATING SCALE - C-SSRS
6. HAVE YOU EVER DONE ANYTHING, STARTED TO DO ANYTHING, OR PREPARED TO DO ANYTHING TO END YOUR LIFE?: YES
5. HAVE YOU STARTED TO WORK OUT OR WORKED OUT THE DETAILS OF HOW TO KILL YOURSELF? DO YOU INTEND TO CARRY OUT THIS PLAN?: YES
1. IN THE PAST MONTH, HAVE YOU WISHED YOU WERE DEAD OR WISHED YOU COULD GO TO SLEEP AND NOT WAKE UP?: YES
4. HAVE YOU HAD THESE THOUGHTS AND HAD SOME INTENTION OF ACTING ON THEM?: NO
3. HAVE YOU BEEN THINKING ABOUT HOW YOU MIGHT KILL YOURSELF?: YES
2. HAVE YOU ACTUALLY HAD ANY THOUGHTS OF KILLING YOURSELF IN THE PAST MONTH?: YES

## 2025-02-13 NOTE — TELEPHONE ENCOUNTER
"Writer spoke with Whit for follow up to yesterday's conversation. She discussed grief and anger related to her daughter and BENJAMIN not allowing her to see first grandchild due to concerns about her safety. Whit is frustrated that her daughter is \"cutting us off\" and saying that daughter had to parent Whit (due to Whit's PTSD).   Her Son is visiting from [facility] tomorrow, and then flying to [place] to visit Wiht's grandchild.   Whit continues to experience suicidal ideation. She has been making preparations such as sharing her passwords with her  and starting goodbye letters to family members. She does endorse having a suicidal plan which she declines to share with writer. She states she does not have the means to complete suicidal plan and denies having any firearms or weapons in the house.  Whit plans to have  bring her to [facility] when she returns home within the hour. She will share suicidal plan with Provider in ER. Patient contracts for safety until her  returns home and agrees to call 911 if that changes.  Whit made future oriented statements, specifically that she's looking forward to seeing her Son when he visits tomorrow, and scheduling a follow up visit with Cherrie Davis on Monday 2/17/25.   Cherrie Davis updated.  "

## 2025-02-13 NOTE — TELEPHONE ENCOUNTER
Writer spoke with  intake department and updated them that patient would be arriving to [facility] ER[facility] with . Collateral information and clinic phone number provided.

## 2025-02-13 NOTE — ED NOTES
64 year old female received to the [facility] unit due to depression with suicidal ideation that has been increasing in intensity for the past couple of days. (Had stashed some medication at home with plan to overdose -on way into [facility] patient shared the location of pills with her  so he could dispose when he got home) Current depression triggered by trip to see her daughter and son in law in [place] after the birth of their first child ( visit ended up in a lot of conflict-which ended up being very traumatizing for patient) Referred to [facility] today by OP therapist and psychiatrist. Pt. very tearful during intake process. Having twitches and tics (she reports that they are a involuntary trauma response).    Nursing and risk assessments completed.  Assessments reviewed with LMHP and physician. Video monitoring in progress, patient informed.  Admission information reviewed with patient. Patient given a tour of [facility] and instructions on using the facility. Questions regarding [facility] addressed. Pt search completed.

## 2025-02-13 NOTE — ED PROVIDER NOTES
History     Chief Complaint:  Suicidal       HPI   Preethi Mays is a 64 year old female with a history of PTSD who comes today for concerns of suicidal ideation and exacerbation of her PTSD.  She reports that she was referred by her psychologist and her psychiatrist.  She has been in treatment for PTSD for about 15 years for remote trauma.  She states she has been doing well.  However, last week the went to [place] and expectation of the birth of their first grandchild.  Apparently the parents have refused to allow them to be with the , hold the  and are not sending pictures of the .  This has been extremely stressful and has exacerbated her PTSD.  She is having thoughts of wanting to harm herself.  She has not done anything to harm herself but feels that she is getting close.  She feels that she needs to organize everything further as she wants to get her affairs in order because she feels like she has nothing to live for.  No symptoms of COVID or recent known COVID exposures or positive test.  No diabetes or seizure disorder.    Independent Historian:     is present and provides additional information    Review of External Notes:  I reviewed the nursing telephone triage note from earlier in the day.  Behavioral health intake received a call and recommended the patient present to our hospital to be seen in the [facility] unit.  Endorsing worsening suicidal ideation and providing passwords to  in preparation but had not disclosed means.  Was unwilling to discuss.    Allergies:  Keflex [Cephalexin]  Penicillins  Sulfa Antibiotics     Medications:    albuterol (PROAIR HFA/PROVENTIL HFA/VENTOLIN HFA) 108 (90 Base) MCG/ACT inhaler  busPIRone (BUSPAR) 10 MG tablet  cyclobenzaprine (FLEXERIL) 5 MG tablet  cycloSPORINE (RESTASIS) 0.05 % ophthalmic emulsion  doxycycline (PERIOSTAT) 20 MG tablet  EF-A4-G61-Omega 3-Phytosterols (BP VIT 3) 1 MG CAPS  fluorometholone (FML LIQUIFILM) 0.1 %  "ophthalmic suspension  ketoconazole (NIZORAL) 2 % external cream  Melatonin 10 MG TABS tablet  Melatonin 2.5 MG CAPS  metroNIDAZOLE (METROGEL) 1 % external gel  metroNIDAZOLE (NORITATE) 1 % cream  multivitamin w/minerals (THERA-VIT-M) tablet  Rizatriptan Benzoate (MAXALT PO)  tamoxifen (NOLVADEX) 10 MG tablet  tamoxifen (NOLVADEX) 10 MG tablet  Vitamin D, Cholecalciferol, 1000 units CAPS        Past Medical History:    Past Medical History:   Diagnosis Date    Blepharitis     Depression     PTSD (post-traumatic stress disorder)     Rosacea     TMJ (dislocation of temporomandibular joint)        Past Surgical History:    Past Surgical History:   Procedure Laterality Date    BREAST SURGERY      R) lumpectomy inclucing lymph nodes    COLONOSCOPY      COLONOSCOPY N/A 6/16/2020    Procedure: COLONOSCOPY;  Surgeon: Natalya Juarez MD;  Location:  GI    GI SURGERY      hemerrhoid surgery x2    OPEN REDUCTION INTERNAL FIXATION WRIST Left 10/15/2018    Procedure: OPEN REDUCTION INTERNAL FIXATION LEFT DISTAL RADIUS;  Surgeon: Hesham Whelan MD;  Location:  OR        Physical Exam   Patient Vitals for the past 24 hrs:   BP Temp Temp src Pulse Resp SpO2 Height Weight   02/13/25 1343 136/80 97.9  F (36.6  C) Temporal 95 18 98 % 1.613 m (5' 3.5\") 63.5 kg (140 lb)        Physical Exam  General: Well-nourished, no acute distress  Eyes: PERRL, conjunctivae pink no scleral icterus or conjunctival injection  ENT:  Moist mucus membranes  Respiratory:  No respiratory distress  CV: Normal rate   Skin: Warm, dry.  No rashes or petechiae  Musculoskeletal: No peripheral edema or calf tenderness  Neuro: Alert and oriented to person/place/time  Psychiatric: Sad, tearful affect.  Calm and cooperative.    Emergency Department Course     Laboratory: Imaging:   Labs Ordered and Resulted from Time of ED Arrival to Time of ED Departure - No data to display  No orders to display           Emergency Department Course & " Assessments:       Interventions:  Medications - No data to display     Assessments, Consults, Discussions of ManagementTests:       Social Determinants of Health affecting care:  Stress/Adjustment Disorders    Disposition:  [facility]     Impression & Plan      Medical Decision Making:    Preethi Mays is a 64 year old female who presents for evaluation of suicidal ideation in the setting of recent trauma.  There are no concerns for or signs at this point of suicide attempt or ingestion, no concerning findings on the remainder of physical exam. The patient has no symptoms of exposures or high risk exposures. The patient is medically cleared and deemed a good candidate for [facility] for further psychiatric evaluation and care. They were in agreement and are transferred to the [facility] unit in stable condition.    Diagnosis:    ICD-10-CM    1. Depression with suicidal ideation  F32.A     R45.851       2. Acute reaction to situational stress  F43.0       3. PTSD (post-traumatic stress disorder)  F43.10            2/13/2025   Cherrie Velasquez MD Cho, Amy C, MD  02/13/25 [id number]

## 2025-02-13 NOTE — ED NOTES
[facility]  ED to [facility] Checklist:      Goal for [facility]: Suicidality    Current Behavior: Sad and Cooperative    Safety Concerns: Suicidal, with a plan to    Legal Hold Status: Voluntary    Medically Cleared by ED provider: Yes    Patient Therapeutically Searched: Not searched - Currently in triage    Belongings: Remain with patient    Independent Ambulation at Baseline: Yes/No: Yes    Participates in Care/Conversation: Yes/No: Yes    Patient Informed about [facility]: Yes/No: Yes    DEC: Not ordered    Patient Ready to be Transferred to [facility]? Yes/No: Yes once medically cleared

## 2025-02-13 NOTE — ED TRIAGE NOTES
"Pt sent by her mental health team, Cherrie Davis and Symone Joel for [facility] due to SI with a plan. Pt does not want to disclose to writer what her plan is but reports \"fresh trauma, depression and PTSD.\" Pt states she can contract for safety in the ED. Arrives with family member.      Triage Assessment (Adult)       Row Name 02/13/25 1341          Triage Assessment    Airway WDL WDL        Respiratory WDL    Respiratory WDL WDL        Skin Circulation/Temperature WDL    Skin Circulation/Temperature WDL WDL        Cardiac WDL    Cardiac WDL WDL        Peripheral/Neurovascular WDL    Peripheral Neurovascular WDL WDL        Cognitive/Neuro/Behavioral WDL    Cognitive/Neuro/Behavioral WDL WDL                     "
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
## 2025-02-14 NOTE — DISCHARGE INSTRUCTIONS
Patient Navigation Hub - Scheduled Appointment  You have been scheduled a telephone appointment with the Mental Health and Addiction Services Patient Navigation Hub. As a reminder, this is not an in-person appointment. A Navigator will contact you at your personal telephone number on 2/14/25 at 2:00p. You can expect a 15-30 minute appointment. You will discuss programming options and be assisted in next steps. If you have any further questions or concerns, please contact the Navigation Hub at [phone]. Note: You will not be charged for this telephone appointment.    Our Navigators work to be your point-of-contact for trustworthy and compassionate care from Emergency Services to [facility] s Programmatic Care. We will provide resources and communication to help guide you into programmatic care, other internal resources (I.e., Transition Clinic), and/or community programs. Ultimately, our goal is to be the one-stop-shop of communication, coordination, and support for you.    Phone: [phone]  Email: [email or web address]  Fax: [phone]    [facility] Programmatic Care  The following is a list of our programming options that may fit your next steps:    Programs  Mental Health  Intensive Outpatient Program (IOP)  Partial Hospitalization Program (PHP)  Day Treatment  Substance Use Disorder  Intensive Outpatient Program  Outpatient Group  Mental Health & Substance Use Disorder  Co-Occurring Intensive Outpatient Program  Residential  Available Locations  [facility], [place], [place], [place]  Note: Specific program options vary by location.    Transition Clinic  After leaving Emergency Services, it may take up to 30 days to enter a program. Knowing support is important during this period of time, we offer an urgent model of mental health care via the Transition Clinic. We encourage you to discuss this with your Navigator during your  telephone appointment.    FAQ  What can I expect after leaving Emergency Services?  If not already, you will soon be contacted by a Navigator who will work with you to find a program that fits your needs. If you desire to enter one of our [facility] programs, you will enter our programmatic care intake where an assessment will likely be needed over telephone, virtually, or in-person. After this assessment, a Navigator will connect with you again to provide a handoff to the specific program for next steps.   Please note that it may take up to 30 days for you to begin a program. If an extended wait occurs, please consider services at the Transition Clinic.  What is programmatic care?  It is a highly structured and comprehensive approach designed to address mental health and substance use disorders.   Programmatic care is typically used when individuals have not responded well to less intensive treatments or when they require a higher level of intervention due to the severity of their condition. It can be found in various settings, such as an outpatient location, residential treatment facilities, or inpatient hospitals.  Each program varies in duration and weekly commitments. Ask a Navigator for more program details.

## 2025-02-14 NOTE — ED PROVIDER NOTES
"[facility] Unit - Psychiatric Consultation  [facility] Emergency Department    Preethi Mays MRN: [id number]   Age: 64 year old YOB: 1960     Telemedicine Visit: The patient's condition can be safely assessed and treated via synchronous audio and visual telemedicine encounter.      Reason for Telemedicine Visit: Services only offered telehealth      Originating Site (Patient Location): [facility] emergency department unit    Distant Site (Provider Location): Provider Remote Home Office Setting    Consent:  The patient/guardian has verbally consented to: the potential risks and benefits of telemedicine (video visit or phone) versus in person care; bill my insurance or make self-payment for services provided; and responsibility for payment of non-covered services.     Mode of Communication: [employer], a secure HIPAA compliant video platform      Start time:  1940  End time:   2020   History     Chief Complaint   Patient presents with    Suicidal     HPI  Preethi \"Whit\" Inocencia Mays is a 64 year old female with history notable for a history of significant trauma from childhood through young adulthood, and anxiety who presented voluntarily to the ED with increasing symptoms of depression and intrusive suicide ideation in the presence of heightened psychosocial and interpersonal stressors. She was cleared medically in the ED and transferred to [facility] for additional assessment and treatment planning. At the time of the assessment today, 2/13/25 she is nearing 7 hours in emergency care.    Please see the [facility] assessment & reassessment (if available), ED physician notes and nursing notes for additional information and collateral content if available. All were reviewed prior to meeting with the patient. Pertinent content includes the following: Pt was taking sertraline from 1997 - 2007 which she stopped on her own. Currently she takes Buspar 30 mg twice daily, this was recently increased from 20 mg twice " daily.     Whit is interviewed while she is seated in a conference room. She is engaged throughout the interview and appears to be a reliable informant. She reports an increase in anxiety and depression with an increased feeling of inadequacy after returning home from visiting her daughter and son-in-law and their new baby, who is just a few weeks old. Reportedly, her daughter and son-in-law have concerns about Whit holding the baby because Whit previously explained to them that she feels dizzy from the Buspar, and also because of behaviors Whit interprets as remnants of PTSD which are outside of her control. She reported feeling like she will never be a part of her grandchild's life and the current rift with her daughter will hurt her entire family. These beliefs have resulted in feeling hopeless to the point that she started to thinking about suicide and methods of carrying this out (overdose), and she told her  her passwords and started writing letters to family members. She reports now that she feels more hopeful after speaking with GENIE Yin and is looking forward to attending an IOP program. She denies HI, A/V hallucinations, delusions or paranoia. She reports no difficulty falling asleep, but waking waking early and then not able to fall asleep again. She is open to starting medication for depression and anxiety, and insomnia. She would like to discharge home this evening and was able to plan for safety.     Past Medical History  Past Medical History:   Diagnosis Date    Blepharitis     Depression     PTSD (post-traumatic stress disorder)     Rosacea     TMJ (dislocation of temporomandibular joint)      Past Surgical History:   Procedure Laterality Date    BREAST SURGERY      R) lumpectomy inclucing lymph nodes    COLONOSCOPY      COLONOSCOPY N/A 6/16/2020    Procedure: COLONOSCOPY;  Surgeon: Natalya Juarez MD;  Location:  GI    GI SURGERY      hemerrhoid surgery x2    OPEN REDUCTION INTERNAL  "FIXATION WRIST Left 10/15/2018    Procedure: OPEN REDUCTION INTERNAL FIXATION LEFT DISTAL RADIUS;  Surgeon: Hesham Whelan MD;  Location:  OR     busPIRone (BUSPAR) 10 MG tablet  cyclobenzaprine (FLEXERIL) 5 MG tablet  cycloSPORINE (RESTASIS) 0.05 % ophthalmic emulsion  doxycycline (PERIOSTAT) 20 MG tablet  doxylamine (UNISOM) 25 MG TABS tablet  escitalopram (LEXAPRO) 10 MG tablet  [id number] 3-Phytosterols (BP VIT 3) 1 MG CAPS  fluorometholone (FML LIQUIFILM) 0.1 % ophthalmic suspension  ketoconazole (NIZORAL) 2 % external cream  metroNIDAZOLE (METROGEL) 1 % external gel  multivitamin w/minerals (THERA-VIT-M) tablet  Rizatriptan Benzoate (MAXALT PO)  traZODone (DESYREL) 50 MG tablet  Vitamin D, Cholecalciferol, 1000 units CAPS      Allergies   Allergen Reactions    Keflex [Cephalexin] Hives    Penicillins     Sulfa Antibiotics      Family History  Family History   Problem Relation Age of Onset    Coronary Artery Disease Mother     Breast Cancer Mother     Coronary Artery Disease Father      Social History   Social History     Tobacco Use    Smoking status: Never     Passive exposure: Never    Smokeless tobacco: Never   Vaping Use    Vaping status: Never Used   Substance Use Topics    Alcohol use: Yes    Drug use: No          Review of Systems  A medically appropriate review of systems was performed with pertinent positives and negatives noted in the HPI, and all other systems negative.    Physical Examination   BP: 136/80  Pulse: 95  Temp: 97.9  F (36.6  C)  Resp: 18  Height: 161.3 cm (5' 3.5\")  Weight: 63.5 kg (140 lb)  SpO2: 98 %    Physical Exam  General: Appears stated age.   Neuro: Alert and fully oriented. Extremities appear to demonstrate normal strength on visual inspection.   Integumentary/Skin: no rash visualized, normal color    Psychiatric Examination   Appearance: awake, alert  Attitude:  cooperative  Eye Contact:  fair  Mood:  anxious  Affect:  mood congruent and intensity is " normal  Speech:  clear, coherent  Psychomotor Behavior:  no evidence of tardive dyskinesia, dystonia, or tics  Thought Process:  linear and goal oriented  Associations:  no loose associations  Thought Content:  no evidence of psychotic thought and passive suicidal ideation present  Insight:  fair  Judgement:  intact  Oriented to:  time, person, and place  Attention Span and Concentration:  intact  Recent and Remote Memory:  intact  Language: able to name/identify objects without impairment  Fund of Knowledge: intact with awareness of current and past events    ED Course        Labs Ordered and Resulted from Time of ED Arrival to Time of ED Departure - No data to display    Assessments & Plan (with Medical Decision Making)   Patient presenting with an increase in anxiety and depression in the presence of a history of significant trauma and resulting PTSD, with recent triggering of past trauma and feelings of inadequacy, and persistent negative beliefs about the future, and catastrophic beliefs about the meaning of recent interpersonal conflicts. Suicide ideation has dissipated, and she was able to plan for safety. Together, through a shared decision-making process, a plan of care was determined as is noted below. Nursing notes reviewed noting no acute issues.     I have reviewed the assessment completed by the [facility].     Preliminary diagnosis:    ICD-10-CM    1. Posttraumatic stress disorder  F43.10 Adult Mental Health  Referral      2. Depression with suicidal ideation  F32.A Adult Mental Health  Referral    R45.851       3. Acute reaction to situational stress  F43.0 Adult Mental Health  Referral      4. PTSD (post-traumatic stress disorder)  F43.10       5. ELIJAH (generalized anxiety disorder)  F41.1            Treatment Plan:  - Will start escitalopram 10 mg daily to target the depression with new suicide ideation with preparation.  - Trazodone 25-50 mg at bedtime for insomnia. Last EKG in  2021 showed a QTC interval of 436 ms. She has an appointment with her outpatient psychiatric provider Cherrie Davis CNP on Monday. An updated EKG could be ordered in the outpatient setting.  - Referral for intake for programmatic care.  - Follow up with outpatient psychiatry and therapy as scheduled.  -Medication education provided this visit includes, rationale for medication, importance of compliance, medication interactions, and common side effects.   -Supportive psychotherapy provided regarding patient's stressors and past mental health symptoms problem solving ways to improve overall wellness and cope with acute and chronic mental health symptoms.  - Discharge home, return if symptoms worsen.    --  RICHARD Perera CNP   [facility] EMERGENCY DEPT  EmPATH Unit      Mayra Palomo APRN CNP  02/14/25 0036

## 2025-02-15 ASSESSMENT — ANXIETY QUESTIONNAIRES
1. FEELING NERVOUS, ANXIOUS, OR ON EDGE: MORE THAN HALF THE DAYS
5. BEING SO RESTLESS THAT IT IS HARD TO SIT STILL: MORE THAN HALF THE DAYS
GAD7 TOTAL SCORE: 13
7. FEELING AFRAID AS IF SOMETHING AWFUL MIGHT HAPPEN: NEARLY EVERY DAY
3. WORRYING TOO MUCH ABOUT DIFFERENT THINGS: NOT AT ALL
IF YOU CHECKED OFF ANY PROBLEMS ON THIS QUESTIONNAIRE, HOW DIFFICULT HAVE THESE PROBLEMS MADE IT FOR YOU TO DO YOUR WORK, TAKE CARE OF THINGS AT HOME, OR GET ALONG WITH OTHER PEOPLE: VERY DIFFICULT
8. IF YOU CHECKED OFF ANY PROBLEMS, HOW DIFFICULT HAVE THESE MADE IT FOR YOU TO DO YOUR WORK, TAKE CARE OF THINGS AT HOME, OR GET ALONG WITH OTHER PEOPLE?: VERY DIFFICULT
4. TROUBLE RELAXING: NEARLY EVERY DAY
5. BEING SO RESTLESS THAT IT IS HARD TO SIT STILL: MORE THAN HALF THE DAYS
8. IF YOU CHECKED OFF ANY PROBLEMS, HOW DIFFICULT HAVE THESE MADE IT FOR YOU TO DO YOUR WORK, TAKE CARE OF THINGS AT HOME, OR GET ALONG WITH OTHER PEOPLE?: VERY DIFFICULT
6. BECOMING EASILY ANNOYED OR IRRITABLE: SEVERAL DAYS
GAD7 TOTAL SCORE: 13
6. BECOMING EASILY ANNOYED OR IRRITABLE: SEVERAL DAYS
1. FEELING NERVOUS, ANXIOUS, OR ON EDGE: MORE THAN HALF THE DAYS
4. TROUBLE RELAXING: NEARLY EVERY DAY
3. WORRYING TOO MUCH ABOUT DIFFERENT THINGS: NOT AT ALL
GAD7 TOTAL SCORE: 13
GAD7 TOTAL SCORE: 13
2. NOT BEING ABLE TO STOP OR CONTROL WORRYING: MORE THAN HALF THE DAYS
7. FEELING AFRAID AS IF SOMETHING AWFUL MIGHT HAPPEN: NEARLY EVERY DAY
IF YOU CHECKED OFF ANY PROBLEMS ON THIS QUESTIONNAIRE, HOW DIFFICULT HAVE THESE PROBLEMS MADE IT FOR YOU TO DO YOUR WORK, TAKE CARE OF THINGS AT HOME, OR GET ALONG WITH OTHER PEOPLE: VERY DIFFICULT
2. NOT BEING ABLE TO STOP OR CONTROL WORRYING: MORE THAN HALF THE DAYS

## 2025-02-17 ENCOUNTER — VIRTUAL VISIT (OUTPATIENT)
Dept: PSYCHIATRY | Facility: CLINIC | Age: 65
End: 2025-02-17
Attending: NURSE PRACTITIONER
Payer: COMMERCIAL

## 2025-02-17 DIAGNOSIS — F43.10 PTSD (POST-TRAUMATIC STRESS DISORDER): ICD-10-CM

## 2025-02-17 ASSESSMENT — PATIENT HEALTH QUESTIONNAIRE - PHQ9
SUM OF ALL RESPONSES TO PHQ QUESTIONS 1-9: 19
SUM OF ALL RESPONSES TO PHQ QUESTIONS 1-9: 19
10. IF YOU CHECKED OFF ANY PROBLEMS, HOW DIFFICULT HAVE THESE PROBLEMS MADE IT FOR YOU TO DO YOUR WORK, TAKE CARE OF THINGS AT HOME, OR GET ALONG WITH OTHER PEOPLE: VERY DIFFICULT
SUM OF ALL RESPONSES TO PHQ QUESTIONS 1-9: 19
SUM OF ALL RESPONSES TO PHQ QUESTIONS 1-9: 19
10. IF YOU CHECKED OFF ANY PROBLEMS, HOW DIFFICULT HAVE THESE PROBLEMS MADE IT FOR YOU TO DO YOUR WORK, TAKE CARE OF THINGS AT HOME, OR GET ALONG WITH OTHER PEOPLE: VERY DIFFICULT

## 2025-02-17 NOTE — NURSING NOTE
Current patient location: [address] [place] [id number]    Is the patient currently in the state of [place]? YES    Visit mode: VIDEO    If the visit is dropped, the patient can be reconnected by:VIDEO VISIT: Text to cell phone:   Telephone Information:   Mobile [phone]       Will anyone else be joining the visit? NO  (If patient encounters technical issues they should call [phone])    Are changes needed to the allergy or medication list? No    Are refills needed on medications prescribed by this physician? Discuss with provider    Rooming Documentation:  Questionnaire(s) completed    Reason for visit: RECHECK    Humza RUTH

## 2025-02-17 NOTE — PATIENT INSTRUCTIONS
**For crisis resources, please see the information at the end of this document**   Patient Education    Thank you for coming to the [facility] [place] [facility].     Lab Testing:  If you had lab testing today and your results are reassuring or normal they will be mailed to you or sent through [employer] within 7 days. If the lab tests need quick action we will call you with the results. The phone number we will call with results is # [phone]. If this is not the best number please call our clinic and change the number.     Medication Refills:  If you need any refills please call your pharmacy and they will contact us. Our fax number for refills is [phone].   Three business days of notice are needed for general medication refill requests.   Five business days of notice are needed for controlled substance refill requests.   If you need to change to a different pharmacy, please contact the new pharmacy directly. The new pharmacy will help you get your medications transferred.     Contact Us:  Please call [phone] during business hours (8-5:00 M-F).   If you have medication related questions after clinic hours, or on the weekend, please call [phone].     Financial Assistance [phone]   Medical Records [phone]       MENTAL HEALTH CRISIS RESOURCES:  For a emergency help, please call 911 or go to the nearest Emergency Department.     Emergency Walk-In Options:   EmPATH Unit @ [facility] ([place]): [phone] - Specialized mental health emergency area designed to be calming  [facility] ([place]): [phone]  [facility] ([place]): [phone]  [facility]): [phone]    [facility] Crisis Information:   [place]: [phone]  [place]: [phone]  [place] ([place]) - Adult: [phone]     Child: [phone]  [place] - Adult: [phone]     Child: [phone]  [place]:  [phone]  List of all [facility] resources:   https://[place].gov/dhs/people-we-serve/adults/health-care/mental-health/resources/crisis-contacts.jsp    National Crisis Information:   Crisis Text Line: Text  [place]  to 463700  Suicide & Crisis Lifeline: 988  National Suicide Prevention Lifeline: 5-268-968-TALK (1-947.314.4154)       For online chat options, visit https://suicidepreventionlifeline.org/chat/  Poison Control Center: 4-588-257-6852  Trans Lifeline: 6-578-739-5262 - Hotline for transgender people of all ages  The [employer] Project: 2-241-170-6872 - Hotline for LGBT youth     For Non-Emergency Support:   Fast Tracker: Mental Health & Substance Use Disorder Resources -   https://www.[employer].org/
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
## 2025-02-18 ENCOUNTER — VIRTUAL VISIT (OUTPATIENT)
Dept: BEHAVIORAL HEALTH | Facility: CLINIC | Age: 65
End: 2025-02-18
Payer: COMMERCIAL

## 2025-02-18 ENCOUNTER — TELEPHONE (OUTPATIENT)
Dept: BEHAVIORAL HEALTH | Facility: CLINIC | Age: 65
End: 2025-02-18
Payer: COMMERCIAL

## 2025-02-18 ENCOUNTER — BEH TREATMENT PLAN (OUTPATIENT)
Dept: BEHAVIORAL HEALTH | Facility: CLINIC | Age: 65
End: 2025-02-18
Attending: PSYCHIATRY & NEUROLOGY

## 2025-02-18 DIAGNOSIS — F33.1 MODERATE EPISODE OF RECURRENT MAJOR DEPRESSIVE DISORDER (H): Primary | ICD-10-CM

## 2025-02-18 NOTE — TELEPHONE ENCOUNTER
" Summary of Patient Care Communication Handoff to Patient Navigator Coordinator    PATIENT'S NAME: Preethi Mays  MRN:   [id number]  :   1960    DATE OF SERVICE: 25    Referral Needed: Yes    Is the patient coming from an inpatient unit?  No    What program is this referral for? Adult Mental Health Referral     Level of Care Recommended:  Combined Intensive Outpatient Day Treatment Program (IOP-ADT) / Adult Day Treatment Program (ADT)     Specialty Track Recommendations:  Other:  Mixed Age or 55+; pt is open to either.      Schedule Preferences: Afternoons Preferred     Are there any potential barriers for entrance into programmatic care? Not at this time.     Followed up from  Needed?:  No     Mental Health Referral Needed: No     Release of Information Needed:  No     Faxing Needed: No     Follow up Requests:  Referral to designated [facility].     Comments: Pt is agreeable to IOP and would prefer a track that meets in the afternoon. Pt is open to mixed age groups and 55+; pt may ask about the current age range in groups as she likes a \"variety\". Thank you!        Jnue BUSTILLO, [facility]     Patient Navigator Coordinator Contact Information  Pool Message: [email or web address] ([phone])  Phone:  [phone]  Fax:  [phone]  Email:  [email or web address]               "

## 2025-02-18 NOTE — PROGRESS NOTES
RN Review of Medical History / Physical Health Screen  Outpatient Mental Health Programs - [facility] Mental Health Outpatient Programs    PATIENT'S NAME: Preethi Mays  Preferred name: Whit   64 year old  MRN:   [id number]  :   1960  ACCT. NUMBER: [id number]  CURRENT AGE:  64 year old    DATE OF DIAGNOSTIC ASSESSMENT: 25  DATE OF ADMISSION: 2025     Please see Diagnostic Assessment for additional Medical History.     General Health:   Have you had any exposure to any communicable disease in the past 2-3 weeks? no     Do you have a history of seizures?     If so, do you have a seizure plan? Known triggers?     Notify patient that we will call 911 (if virtual) or a code (if in-person), if we were to witness seizure during group. No         Nutrition:    Are you on a special diet? If yes, please explain:  no I don't eat a lot of meat.   Do you have any concerns regarding your nutritional status? If yes, please explain:  no   Have you had any appetite changes in the last 3 months?  No     Have you had any weight loss or weight gain in the last 3 months?  No           Height/Weight Review:  Patient reported height:  did not assess      Patient reports weight:  Date last checked:  deferred           Patient height and weight recorded by RN in epic flowsheet: No; Unable to measure  Programmatic Care currently provided via telehealth. All pt weights and heights will be collected through patient self-report and recorded in physical health screening progress note upon admission to the program.      BMI Review:  Was the patient informed of BMI? No.     Findings No Intervention       Fall Risk:   Have you had any falls in the past 3 months? no     Do you currently use any assistive devices for mobility?   no      Does the patient have medication concerns? No, I just added a new medication that I have only taken twice, new trauma lost some weight d/t lack of taste/appetite.      Was  an MTM referral placed? no      Does the patient have any acute or chronic pain concerns that might impact participation in the program? no    Additional Comments/Assessment: pt denies medical injuries. Sleep is interrupted.    Per completion of the Medical History / Physical Health Screen, is there a recommendation to see / follow up with a primary care physician/clinic or dentist?    Yes, Recommendations:   physical exam yearly       Alea Pang RN  2/18/2025

## 2025-02-18 NOTE — PROGRESS NOTES
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 this assessment was obtained from the medical record and  provided by patient who is a good historian. Patient will have open access to their mental health medical record.    9.   Interactive Complexity: No    10. Safety Plan:     [place] Safety Plan      Creation Date: 2/13/25       Step 1: Warning signs:    Warning Signs    Thoughts of not wanting to live anymore or of actually killing myself    Spending more time alone or talking less to friends      Step 2: Internal coping strategies - Things I can do to take my mind off my problems without contacting another person:    Strategies    Writing    Listening to music    Reading a book      Step 3: People and social settings that provide distraction:    Name Contact Information     [phone]    Children     Friends          Step 4: People whom I can ask for help during a crisis:    Name Contact Information     [phone]    Sister     Children       Step 5: Professionals or agencies I can contact during a crisis:    Clinician/Agency Name Phone Emergency Contact    [facility] [place] crisis team [phone]     Walk-in behavioral health support 1800  [facility] [phone]     Therapist      Psychiatrist        Local Emergency Department Emergency Department Address Emergency Department Phone    [facility] [address], [place] [address] [phone]      Suicide Prevention Lifeline Phone: Call or Text 678  Crisis Text Line: Text HOME to 309500     Step 6: Making the environment safer (plan for lethal means safety):   Inform  of urges to overdose on medications. Have  lock or administer medications.     Optional: What is most important to me and worth living for?:   Family     [place] Safety Plan. Shelley Laird and Jacob Arrington. Used with permission of the authors.         Provider Name/ Credentials:  June Diaz MSW, LICSW  February 18, 2025
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
## 2025-02-18 NOTE — TELEPHONE ENCOUNTER
"**Patient Navigator Follow Up**    2/18/2025;    Referral fro IOP-ADT;  55 plus track    Mixed Age or 55+; pt is open to either.    Comments: Pt is agreeable to IOP and would prefer a track that meets in the afternoon. Pt is open to mixed age groups and 55+; pt may ask about the current age range in groups as she likes a \"variety\". Thank you!        June Diaz  MSW, [facility]          I called and spoke to patient about programming.  She was adamant she spoke with someone at the program prior to committing in regards to her program questions.    I provided her with Supervisor, Melissa Mac's phone# and she was going to reach out to her directly.      Patient spoke with Melissa and is going to do 55 plus.      I called her back and she is starting in the 55 plus afternoon track on Thursday, 2/20.    I added her to the census and sent in basket message to the program as well.  I sent her welcome letter through [employer].      Thank you,    Misa LOVETT  Patient Navigator        "

## 2025-02-18 NOTE — PROGRESS NOTES
[place] Safety Plan       Creation Date: 2/13/25        Step 1: Warning signs:     Warning Signs     Thoughts of not wanting to live anymore or of actually killing myself     Spending more time alone or talking less to friends      Step 2: Internal coping strategies - Things I can do to take my mind off my problems without contacting another person:     Strategies     Writing     Listening to music     Reading a book      Step 3: People and social settings that provide distraction:     Name Contact Information      [phone]     Children       Friends           Step 4: People whom I can ask for help during a crisis:     Name Contact Information      [phone]     Sister       Children        Step 5: Professionals or agencies I can contact during a crisis:     Clinician/Agency Name Phone Emergency Contact     [facility] mobile crisis team [phone]       Walk-in behavioral health support [address] [place] [address] [phone]       Therapist         Psychiatrist            Local Emergency Department Emergency Department Address Emergency Department Phone     [facility] [address] [place] [address] [address] [phone]      Suicide Prevention Lifeline Phone: Call or Text 478  Crisis Text Line: Text HOME to 440314     Step 6: Making the environment safer (plan for lethal means safety):   Inform  of urges to overdose on medications. Have  lock or administer medications.     Optional: What is most important to me and worth living for?:   Family     [place] Safety Plan. Shelley Laird and Jacob Arrington. Used with permission of the authors.

## 2025-02-18 NOTE — PROGRESS NOTES
LOCUS Worksheet     Name: Preethi Mays MRN: [id number]    : 1960      Gender:  female    PMI:  BCBS Medicare   Provider Name: KVNG Marin   Provider NPI:  [id number]    Actual level of Care Provided:  OP therapy    Service(s) receiving or referred to:  Adult IOP/DT    Reason for Variance: Pt's presentation of symptoms and impact to daily functioning meets criteria for IOP/DT level of care.       Rating completed by: KVNG Marin      I. Risk of Harm:   2      Low Risk of Harm    II. Functional Status:   4      Serious Impairment    III. Co-Morbidity:   3      Significant Co-Morbidity    IV - A. Recovery Environment - Level of Stress:   2      Mildly Stressful Environment    IV - B. Recovery Environment - Level of Support:   3      Limited Support in Environment    V. Treatment and Recovery History:   2      Significant Response to Treatment and Recovery Management    VI. Engagement and Recovery Project:   2      Positive Engagement and Recovery       18 Composite Score    Level of Care Recommendation:   17 to 19       High Intensity Community Based Services

## 2025-02-19 ENCOUNTER — TELEPHONE (OUTPATIENT)
Dept: BEHAVIORAL HEALTH | Facility: CLINIC | Age: 65
End: 2025-02-19
Payer: COMMERCIAL

## 2025-02-19 NOTE — PROGRESS NOTES
"Individualized Treatment Plan       Patient's Name: Preethi Mays   Preferred Name: Whit  Pronouns:  She/Her   :   1960  MRN:   [id number]    Program Admission Date: 2025     Estimated Program Discharge Date: 5/15/25   (Please note, this date is subject to change based on needs and progress toward identified goals).    Date of Initial Treatment Plan: 2025  Transferring to [facility] on 2025    Chief Concern: I am seeking treatment in this program because:  \"A mental health crisis that feels different from previous episodes\".   Contributing Factors:    Symptoms/Difficulties:  Chronic grief or loss , Suicidal thoughts or ideation , and Self-injurious thoughts or ideation  Notes that SI and SIB occurred prior to admission to [facility].  Denies current safety concerns.  Social withdrawal or isolation   Sleep disturbances (insomnia or excessive sleep)   Negative thinking patterns (e.g., catastrophizing, all-or-nothing)   Increased conflict or tension in personal life     Impacts On My Daily Life:   Safety: \"communicate with spouse\" if safety concerns arise.    Wellness: physical activity/movement \"keep walking 3-4 miles per day.  Continue weigh ins twice a week.  Continue Y cardio once per week\".    Daily Life Activities: social participation \"reconnect to friends\"      Treatment Goal(s):   My main goal for treatment in this program is: \"I will be able to plan a conversation with my daughter by \"    Secondary and Supportive Goals (optional):  \"    I will know I'm making progress in my goal when: \"I can reduce the negative noise in my brain\"    I think I will be ready to discharge when: \"I am more present than the negative\"    Cultural Values and Needs: \"Atheist - sensitivity to Jewish\"     Staff will assist by:  No answer    Progress updates:  25  Whit has made significant progress toward goals.  Has begun conversations with daughter.  Has been assertive with " "individual therapist regarding their relationship.  Practising affirmations and exploring grounding techniques.  Reports feeling \"more in my body\"  5/15/25  Whit has made progress with goal.  She has had several conversations with daughter, using cope ahead to prepare for these conversations.  Last week visited daughter in [place] and was able to visit with grandchild.\"  \"      Aftercare Plan:  return to individual therapy, outpatient psychiatry/medication management      My Strengths:  Per DA: Patient identified the following strengths or resources that will help them succeed in treatment: commitment to health and well being, exercise routine, friends / good social support, family support, insight, and intelligence.       My Limitations or Vulnerabilities:  Anxiety  Depressive symptoms   Trauma/Abuse/Neglect      My Other Health Issues:  None reported    Supports:    I want to include the following support people in my treatment: \"my  is helping me plan a conversation with my daughter\"  (Please note we cannot share information with anyone unless you sign a release of information. You can specify what information can and cannot be shared and you can retract that written permission at any time).    Assessments: The following assessments were completed by patient for this visit:  PHQ9:       4/5/2018     3:00 PM 5/17/2018     2:00 PM 8/24/2018     8:30 AM 2/12/2019     3:30 PM 5/16/2019     3:00 PM 8/12/2019    10:30 AM 2/17/2025    12:53 PM   PHQ-9 SCORE   PHQ-9 Total Score MyChart       19 (Moderately severe depression)   PHQ-9 Total Score 3 4 3 3 3 2 19        Patient-reported     GAD7:       2/4/2024    10:52 AM 2/15/2025    11:47 PM   ELIJAH-7 SCORE   Total Score 9 (mild anxiety) 13 (moderate anxiety)   Total Score 9 13        Patient-reported     PROMIS 10-Global Health (all questions and answers displayed):       4/3/2023     4:32 PM 7/7/2023     8:50 PM 12/26/2023    12:44 PM 4/29/2024    12:02 PM 10/9/2024    " 10:08 AM 1/3/2025    12:43 PM 2/17/2025     1:14 PM   PROMIS 10   In general, would you say your health is:          In general, would you say your quality of life is:          In general, how would you rate your physical health?          In general, how would you rate your mental health, including your mood and your ability to think?          In general, how would you rate your satisfaction with your social activities and relationships?          In general, please rate how well you carry out your usual social activities and roles          To what extent are you able to carry out your everyday physical activities such as walking, climbing stairs, carrying groceries, or moving a chair?          In the past 7 days, how often have you been bothered by emotional problems such as feeling anxious, depressed, or irritable?          In the past 7 days, how would you rate your fatigue on average?          In the past 7 days, how would you rate your pain on average, where 0 means no pain, and 10 means worst imaginable pain?          In general, would you say your health is:          In general, would you say your quality of life is:          In general, how would you rate your physical health?          In general, how would you rate your mental health, including your mood and your ability to think?          In general, how would you rate your satisfaction with your social activities and relationships?          In general, please rate how well you carry out your usual social activities and roles. (This includes activities at home, at work and in your community, and responsibilities as a parent, child, spouse, employee, friend, etc.)          To what extent are you able to carry out your everyday physical activities such as walking, climbing stairs, carrying groceries, or moving a chair?          In the past 7 days, how often have you been bothered by emotional problems such as feeling anxious, depressed, or irritable?           In the past 7 days, how would you rate your fatigue on average?          In the past 7 days, how would you rate your pain on average, where 0 means no pain, and 10 means worst imaginable pain?          Global Mental Health Score          Global Physical Health Score          PROMIS TOTAL - SUBSCORES              Information is confidential and restricted. Go to Review Flowsheets to unlock data.        Diagnosis/es:  296.32 (F33.1) Major Depressive Disorder, Recurrent Episode, Moderate _ and With anxious distress.  3. Other Diagnoses that is relevant to services:   300.00 (F41.9) Unspecified Anxiety Disorder  309.81 (F43.10) Posttraumatic Stress Disorder (includes Posttraumatic Stress Disorder for Children 6 Years and Younger)  With dissociative symptoms.    Level of Care: Adult Mental Health Outpatient Programs    Program Track: IOP/DT 3 55+ [place]    Multidisciplinary Team Members: Team IOP/DT 3:  Johnson SOMERS, DNP, CNP, APRN, PMHNP-BC; Cal FONTAINE MD; Verenice SOMERS LICSW; Tiffany MCARTHUR LICSW; Alea JOEL RN-BSN; Opal SALVADOR, OTR/L    Program services: Group and Individual Psychotherapy (at least 1 hour per day), Patient Education and Training Related to Treatment (at least one hour per day), Occupational Therapy and/or Nurse Liaison Education and Support (at least one hour per day), Psychiatric Evaluation and Management (at intake and least once per month)    Program Interventions:  Assist to identify treatment goals, including identifying and problem-solving barriers. Assist in identifying strengths and how to mobilize them in meeting treatment goals. Assist in identifying limitations and other health concerns, and ways to manage those in the recovery process and beyond. Assist in identifying and helping to establish, maintain, and strengthen support network. Assist with and facilitate discharge planning, including connection with available and appropriate community services.      Ongoing psychotherapeutic and multidisciplinary  assessment. Regular meetings with psychiatrist or other independent psychiatric provider. Assessment by registered nurse liaisons at admission and ongoing/as needed. Complete OT assessment where applicable/as needed. Complete functional assessment(s) where applicable/as needed.    Plan for and help with maintaining safety, including revising and updating written safety plan where applicable. Assist in identifying and applying coping skills. Assist in identifying and problem solving barriers to treatment and clinical progress. Utilize motivational interviewing techniques to promote change. Provide education to promote informed decisions and decision-making. Utilize motivational interviewing techniques to promote change.     Provide education regarding how to effectively use group process. Teach skills to help identify and manage thoughts, behaviors, and emotions, with specific skills/topics including: emotional regulation, identification of values, understanding and modifying distorted thinking, understanding and coping with grief and loss, shame, self-compassion, self-awareness, mindfulness, radical acceptance, distress tolerance skills, hope, problem-solving, communication, interpersonal effectiveness, distress tolerance. Facilitate increased self-awareness.       Provide education regarding: life balance/structure/routine, goal setting and integration, prioritizing and planning, leisure values, behavior activation, motivation, energy management, stress management, neuroscience of change, sensory modulation, window of tolerance, ANS and vagus nerve activation, sensory enhanced mindfulness, sensory/body based grounding skills.    Provide education regarding: eight dimensions of wellness, sleep hygiene, medication education and management, stigma, nutrition, signs and symptoms, community resources, support network education.     Abuse Prevention Plan:   Treatment team will provide education regarding skill  "development to address symptom management, life skills, wellness, discharge planning, and personal safety.  Collaborate with patients internal and external providers to coordinate care.  Treatment will be provided in a safe, therapeutic environment.  Program provider will offer medication adjustment/management as needed/indicated.   Program will monitor for use of substances.    Patient Participation in Plan:  This plan was developed by the patient named at the top of the document with assistance from the multidisciplinary care team. The patient agrees with this care plan, worked with staff to develop goals, and has received a copy. Supports and/or family have been invited to participate in the creation of this plan at the discretion of the patient.     Discharge Criteria:   Patient will discharge from the program when the goal(s) above have been met, the patient is otherwise deemed to no longer need and/or benefit from the program/this level of care, another treatment modality is identified that would better meet treatment needs and goals, and/or the patient opts to discharge from the program for any reason.    Acknowledgement of Current Treatment Plan   I have reviewed my treatment plan with my treatment team members.  I agree with the plan as it is written in the electronic health record.     Name:                   Signature:                                                          Date:  Preethi Mays Patient signature page and treatment plan worksheet in media tab   2/19/2025   Cal Sanchez MD  Psychiatrist/Medical Director See above 2/19/2025   NOTE: Patient signatures are completed manually and scanned into the electronic medical record. See \"Media\" tab in Epic.    "

## 2025-02-19 NOTE — TELEPHONE ENCOUNTER
----- Message from Misa GARCIA sent at 2/18/2025  3:35 PM CST -----  Regarding: Referral for IOP/DT-3; 55 plus afternoons  Adult Mental Health Programmatic Care Schedule Request    Patient Name: Preethi Mays  Location of programming: [facility]  Start Date: 2/20/2025    Adult Program Group: Adult Program Group: IOP/DT 3  55+ Track [[id number]]  Schedule: M, W, Th, F 1pm-4pm  12 hours per week for 9 weeks  Number of visits to be scheduled: 36 days    Attending Provider (MD):  Dr. Cal Sanchez ([place])  Visit Type:  In-Person    Accommodations Needed: No  Alerts Identified/Substantiation: No  Consulted with Supervisor: No

## 2025-02-20 ENCOUNTER — HOSPITAL ENCOUNTER (OUTPATIENT)
Dept: BEHAVIORAL HEALTH | Facility: CLINIC | Age: 65
End: 2025-02-20
Attending: PSYCHIATRY & NEUROLOGY
Payer: COMMERCIAL

## 2025-02-20 DIAGNOSIS — F43.10 POSTTRAUMATIC STRESS DISORDER: ICD-10-CM

## 2025-02-20 DIAGNOSIS — F33.1 MODERATE EPISODE OF RECURRENT MAJOR DEPRESSIVE DISORDER (H): Primary | ICD-10-CM

## 2025-02-20 PROBLEM — F33.9 RECURRENT MAJOR DEPRESSIVE DISORDER: Status: ACTIVE | Noted: 2025-02-13

## 2025-02-20 PROCEDURE — 90853 GROUP PSYCHOTHERAPY: CPT

## 2025-02-20 ASSESSMENT — COLUMBIA-SUICIDE SEVERITY RATING SCALE - C-SSRS
5. HAVE YOU STARTED TO WORK OUT OR WORKED OUT THE DETAILS OF HOW TO KILL YOURSELF? DO YOU INTEND TO CARRY OUT THIS PLAN?: NO
2. HAVE YOU ACTUALLY HAD ANY THOUGHTS OF KILLING YOURSELF?: YES
1. SINCE LAST CONTACT, HAVE YOU WISHED YOU WERE DEAD OR WISHED YOU COULD GO TO SLEEP AND NOT WAKE UP?: YES

## 2025-02-20 NOTE — GROUP NOTE
Psychotherapy Group Note    PATIENT'S NAME: Preethi Mays  MRN:   [id number]  :   1960  ACCT. NUMBER: [id number]  DATE OF SERVICE: 25  START TIME:  2:00 PM  END TIME:  2:50 PM  FACILITATOR: Verenice Spencer LICSW  TOPIC: MH EBP Group: Relationship Skills  [facility] Adult Mental Health Outpatient Programs  TRACK: IOP/DT 3 55+    NUMBER OF PARTICIPANTS: 8    Summary of Group / Topics Discussed:  Relationship Skills: Assertive Communication/VINOD: Patients were provided with a general overview of assertive communication skills and how practicing assertive communication skills will assist patients in developing healthier and more effective relationships. Patients reviewed their current awareness on ability to practice assertive communication, ways to increase assertive communication, and identified/problem solved barriers to assertive communication.     Patient Session Goals / Objectives:  Identified and discussed patient individual challenges with communication  Discussed objective effectiveness skills using VINOD (DBT)  Presented and practiced effective communication skills in session  Assisted patients in implementing more effective communication skills in their relationships      Patient Participation / Response:  Fully participated with the group by sharing personal reflections / insights and openly received / provided feedback with other participants.    Demonstrated understanding of topics discussed through group discussion and participation, Demonstrated understanding of relationship skills and communication skills, Identified / Expressed personal readiness to incorporate effective communication skills, Verbalized understanding of communication skills, communication challenges, and communication strengths, Identified plan to address barriers to practicing relationship skills , and Practiced skills in session    Treatment Plan:  Patient has an initial individualized treatment plan that  was created as part of their diagnostic assessment / admission process.  A master individualized treatment plan is in the process of being developed with the patient and multi-disciplinary care team.    KVNG Jj

## 2025-02-20 NOTE — PROGRESS NOTES
Intensive Outpatient Program   Physician Certification of Medical Necessity    Patient Name: Preethi Mays  Patient Preferred Name: Whit  Patient : 1960  Patient MRN: [id number]    Attending physician: Nik Spain MD      Admission Certification:    I certify the above-named patient would require partial hospitalization care if intensive outpatient services were not provided and that the patient requires such IOP services for a minimum of 9 hours per week. These services are provided under the care and supervision of a physician and under an individualized plan of treatment that is established and periodically reviewed by a physician in consultation with appropriate staff participating in the program.    From admission date: 25 to 60th day: 25    Nik Spain MD on 2025 at 3:53 PM

## 2025-02-20 NOTE — GROUP NOTE
"Process Group Note    PATIENT'S NAME: Preethi Mays  MRN:   [id number]  :   1960  ACCT. NUMBER: [id number]  DATE OF SERVICE: 25  START TIME:  1:00 PM  END TIME:  1:50 PM  FACILITATOR: Verenice Spencer LICSW  TOPIC:  Process Group    Diagnoses:  296.32 (F33.1) Major Depressive Disorder, Recurrent Episode, Moderate _ and With anxious distress.  3. Other Diagnoses that is relevant to services:   300.00 (F41.9) Unspecified Anxiety Disorder  309.81 (F43.10) Posttraumatic Stress Disorder (includes Posttraumatic Stress Disorder for Children 6 Years and Younger)  With dissociative symptoms.    [facility] Adult Mental Health Outpatient Programs  TRACK: IOP/DT 3 55+    NUMBER OF PARTICIPANTS: 8        Data:    Session content: At the start of this group, patients were invited to check in by identifying themselves, describing their current emotional status, and identifying issues to address in this group.   Area(s) of treatment focus addressed in this session included Symptom Management, Personal Safety, and Community Resources/Discharge Planning.  Whit attended her first day of IOP/DT 3 55+.  Writer outlined group expectations and oriented her to the group process. She reported a history of PTSD. Reported mood is depressed.  She reported having a migraine which she attributes to a medication change. Client denied safety concerns, including SI and SIB. Client denies any chemical use.  Client is taking medications as prescribed. Client's goal is listen in group today (\"not talk\").  She joked that she is usually talkative but that her migraine is causing her to talk less.  Client reported plans to use the following coping skills: humor. Client talked about a bird sighting this morning (\"I'm a bird nerd\"). Peers offered supportive feedback and validation.        2025     3:00 PM   Suicide Ideation Check In   Since last session, how often have you had suicidal thoughts? No thoughts of suicide "         Therapeutic Interventions/Treatment Strategies:  Psychotherapist offered support, feedback and validation and reinforced use of skills. Treatment modalities used include Cognitive Behavioral Therapy and Dialectical Behavioral Therapy. Interventions include Symptoms Management: Promoted understanding of their diagnoses and how it impacts their functioning and Other: Discussed the use of humor to improve mood.    Assessment:    Patient response:   Patient responded to session by accepting feedback, giving feedback, listening, focusing on goals, being attentive, and accepting support    Possible barriers to participation / learning include: and no barriers identified    Health Issues:   None reported       Substance Use Review:   Substance Use: No active concerns identified.    Mental Status/Behavioral Observations  Appearance:   Appropriate   Eye Contact:   Good   Psychomotor Behavior: Normal   Attitude:   Cooperative  Interested Friendly Pleasant  Orientation:   All  Speech   Rate / Production: Normal/ Responsive Normal    Volume:  Normal   Mood:    Depressed   Affect:    Appropriate   Thought Content:   Clear and Safety denies any current safety concerns including suicidal ideation, self-harm, and homicidal ideation  Thought Form:  Coherent  Logical     Insight:    Good     Plan:   Safety Plan: No current safety concerns identified.  Recommended that patient call 911 or go to the local ED should there be a change in any of these risk factors.   Barriers to treatment: None identified  Patient Contracts (see media tab):  None  Substance Use: Not addressed in session   Continue or Discharge: Patient will continue in 55+ Program (55+) as planned. Patient is likely to benefit from learning and using skills as they work toward the goals identified in their treatment plan.      KVNG Jj  February 20, 2025
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
## 2025-02-24 ENCOUNTER — TELEPHONE (OUTPATIENT)
Dept: BEHAVIORAL HEALTH | Facility: CLINIC | Age: 65
End: 2025-02-24
Payer: COMMERCIAL

## 2025-02-24 NOTE — TELEPHONE ENCOUNTER
----- Message from Yocasta LE sent at 2/21/2025  2:34 PM CST -----  Regarding: Scheduling Request - Transfer  Scheduling Request    Patient Name: Whit Mays  Location of programming: [facility]  Start Date: February / 25 / 2025  Group: [id number] on Tuesday, Wednesday, Thursday, and Friday at 09:00 AM to 01:00 PM  Attending Provider (MD): Cal Sanchez MD  Number of visits to be scheduled: 46  Duration of Appointment in minutes: 240 Minutes  Visit Type: In-person or Treatment - 870    Additional notes: Transfer from IOP/DT 3 55 plus to IOP/DT 4

## 2025-02-25 ENCOUNTER — HOSPITAL ENCOUNTER (OUTPATIENT)
Dept: BEHAVIORAL HEALTH | Facility: CLINIC | Age: 65
End: 2025-02-25
Attending: PSYCHIATRY & NEUROLOGY
Payer: COMMERCIAL

## 2025-02-25 PROCEDURE — 90853 GROUP PSYCHOTHERAPY: CPT

## 2025-02-25 PROCEDURE — 90853 GROUP PSYCHOTHERAPY: CPT | Performed by: SOCIAL WORKER

## 2025-02-25 NOTE — GROUP NOTE
Psychotherapy Group Note    PATIENT'S NAME: Preethi Mays  MRN:   [id number]  :   1960  ACCT. NUMBER: [id number]  DATE OF SERVICE: 25  START TIME: 11:00 AM  END TIME: 11:50 AM  FACILITATOR: Preethi Stone LICSW  TOPIC:  EBP Group: Self-Awareness  [facility] Outpatient Programs  TRACK: iop/dt4    NUMBER OF PARTICIPANTS: 9    Summary of Group / Topics Discussed:  Self-Awareness: Self-Compassion: Patients received overview of key concepts in developing self-compassion. Patients discussed mindfulness, self-kindness, and finding common humanity. Patients identified their current approach to problems in their lives and learned skills for increasing self-compassion. Patients identified ways they can put self-compassion skills into practice and problem solve barriers to application of skills.     Patient Session Goals / Objectives:  Muse components of self-compassion  Identify ways to practice self-compassion in daily life  Problem solve barriers to self-compassion practice      Patient Participation / Response:  Fully participated with the group by sharing personal reflections / insights and openly received / provided feedback with other participants.    Demonstrated understanding of topics discussed through group discussion and participation and Demonstrated understanding of values, strengths, and challenges to learn about themselves    Treatment Plan:  Patient has a current master individualized treatment plan.  See Epic treatment plan for more information.    KVNG Shields

## 2025-02-25 NOTE — GROUP NOTE
Psychoeducation Group Note    PATIENT'S NAME: Preethi Mays  MRN:   [id number]  :   1960  ACCT. NUMBER: [id number]  DATE OF SERVICE: 25  START TIME:  9:00 AM  END TIME:  9:50 AM  FACILITATOR: Indra Mercer LPC; Cate Levy OTR  TOPIC:  Life Skills Group: Life Skills  [facility] Adult Mental Health Outpatient Programs  TRACK: IOP/DT-4 TRAUMA  NUMBER OF PARTICIPANTS: 9    Group Topic, Objectives, Goals, and Discussion Points:  Life Skills: Life Skills Clinic   Provided opportunity for patients to independently choose and engage in a therapeutic activity that supports progress towards their goals and psychiatric recovery. Discussed the skill of behavioral activation and acting opposite to emotion to improve motivation in everyday life tasks. The Life Skills Clinic provides a context for patients to monitor their symptoms, gain self-awareness, practice skills (self-regulation, mindfulness, self-talk, focus/concentration, social, productivity), build a sense of self efficacy and mastery, as well as receive validation, support, and resources. Staff checks in, observes, assesses, and monitors performance skills and patterns in context with each group member.      Patient Session Goals and Objectives:   Independently identify a purposeful and meaningful therapeutic activity.  Identified which goal(s) they are intentionally working on during session.   Reflected on their performance and share insight about their progress.  Practiced and identified a way to generalize a skill to their everyday life.       Patient Participation / Response:  Fully participated with the group by sharing personal reflections / insights and openly received / provided feedback with other participants.    Verbalized understanding of content, Patient would benefit from additional opportunities to practice the content to be able to generalize it to their everyday life with increased intentionality, consistency,  and efficacy in support of their psychiatric recovery, and Patient worked towards initial treatment plan goals     Treatment Plan:  Patient has a current master individualized treatment plan.  See Epic treatment plan for more information.    Indra Mercer, LPC

## 2025-02-25 NOTE — GROUP NOTE
Process Group Note    PATIENT'S NAME: Preethi Mays  MRN:   [id number]  :   1960  ACCT. NUMBER: [id number]  DATE OF SERVICE: 25  START TIME: 10:00 AM  END TIME: 10:50 AM  FACILITATOR: Preethi Stone LICSW  TOPIC:  Process Group    Diagnoses:  296.32 (F33.1) Major Depressive Disorder, Recurrent Episode, Moderate _ and With anxious distress.  3. Other Diagnoses that is relevant to services:   300.00 (F41.9) Unspecified Anxiety Disorder  309.81 (F43.10) Posttraumatic Stress Disorder (includes Posttraumatic Stress Disorder for Children 6 Years and Younger)  With dissociative symptoms.    [facility] Outpatient Programs  TRACK: iop/dt4    NUMBER OF PARTICIPANTS: 9        Data:    Session content: At the start of this group, patients were invited to check in by identifying themselves, describing their current emotional status, and identifying issues to address in this group.   Area(s) of treatment focus addressed in this session included Symptom Management and Personal Safety.  Whit shares that she is nervous on her first day in this track.  She shares that she has a goal to restore ruptured relationship with daughter.  Also states that she is noticing body startling and has a goal of intentionally breathing.  She shares that she is proud of being here in program    Therapeutic Interventions/Treatment Strategies:  Psychotherapist offered support, feedback and validation and reinforced use of skills. Treatment modalities used include Cognitive Behavioral Therapy and Dialectical Behavioral Therapy.    Assessment:    Patient response:   Patient responded to session by accepting feedback, giving feedback, listening, focusing on goals, being attentive, accepting support, appearing alert, demonstrating behavior change, and verbalizing understanding    Possible barriers to participation / learning include: and no barriers identified    Health Issues:   None reported       Substance  Use Review:   Substance Use: No active concerns identified.    Mental Status/Behavioral Observations  Appearance:   Appropriate   Eye Contact:   Good   Psychomotor Behavior: Normal   Attitude:   Cooperative   Orientation:   All  Speech   Rate / Production: Normal    Volume:  Normal   Mood:    Anxious  Normal  Affect:    Appropriate   Thought Content:   Clear  Thought Form:  Coherent  Goal Directed  Logical     Insight:    Good     Plan:   Safety Plan: No current safety concerns identified.  Recommended that patient call 911 or go to the local ED should there be a change in any of these risk factors.   Barriers to treatment: None identified  Patient Contracts (see media tab):  None  Substance Use: Not addressed in session   Continue or Discharge: Patient will continue in Adult Day Treatment (ADT)  as planned. Patient is likely to benefit from learning and using skills as they work toward the goals identified in their treatment plan.      Preethi Stone, Stephens Memorial HospitalSW  February 25, 2025

## 2025-02-25 NOTE — GROUP NOTE
Psychotherapy Group Note    PATIENT'S NAME: Preethi Mays  MRN:   [id number]  :   1960  ACCT. NUMBER: [id number]  DATE OF SERVICE: 25  START TIME: 12:00 PM  END TIME: 12:50 PM  FACILITATOR: Preethi Stone LICSW  TOPIC:  EBP Group: Behavioral Activation  [facility] Adult Mental Health Outpatient Programs  TRACK: iop/dt4    NUMBER OF PARTICIPANTS: 9    Summary of Group / Topics Discussed:  Behavioral Activation: Behavior Chain Analysis: Patients explored the steps leading up to identified unwanted behaviors, and ways to replace them with more effective behaviors. Patients examined factors contributing to unwanted behaviors, with a goal of determining ways to interrupt the unhealthy patterns.  The group examined therapy interfering behaviors as it relates to group therapy process    Patient Session Goals / Objectives:  Identify thoughts, feelings, and actions that contribute to unwanted behaviors  Identify thoughts, feelings, and actions that contribute to more effective/healthy and desired behaviors  Make plans to track and implement changes and share experiences in group  Identify personal barriers to change      Patient Participation / Response:  Fully participated with the group by sharing personal reflections / insights and openly received / provided feedback with other participants.    Demonstrated understanding of topics discussed through group discussion and participation and Expressed understanding of the relationship between behaviors, thoughts, and feelings    Treatment Plan:  Patient has a current master individualized treatment plan.  See Epic treatment plan for more information.    KVGN Shields

## 2025-02-26 ENCOUNTER — HOSPITAL ENCOUNTER (OUTPATIENT)
Dept: BEHAVIORAL HEALTH | Facility: CLINIC | Age: 65
End: 2025-02-26
Attending: PSYCHIATRY & NEUROLOGY
Payer: COMMERCIAL

## 2025-02-26 ENCOUNTER — VIRTUAL VISIT (OUTPATIENT)
Dept: PSYCHIATRY | Facility: CLINIC | Age: 65
End: 2025-02-26
Attending: NURSE PRACTITIONER
Payer: COMMERCIAL

## 2025-02-26 DIAGNOSIS — F33.1 MODERATE EPISODE OF RECURRENT MAJOR DEPRESSIVE DISORDER (H): Primary | ICD-10-CM

## 2025-02-26 DIAGNOSIS — F43.10 PTSD (POST-TRAUMATIC STRESS DISORDER): ICD-10-CM

## 2025-02-26 PROCEDURE — 90853 GROUP PSYCHOTHERAPY: CPT | Performed by: SOCIAL WORKER

## 2025-02-26 PROCEDURE — 90853 GROUP PSYCHOTHERAPY: CPT

## 2025-02-26 RX ORDER — BUSPIRONE HYDROCHLORIDE 30 MG/1
30 TABLET ORAL 2 TIMES DAILY
Qty: 60 TABLET | Refills: 0 | Status: SHIPPED | OUTPATIENT
Start: 2025-02-26

## 2025-02-26 ASSESSMENT — PAIN SCALES - GENERAL: PAINLEVEL_OUTOF10: NO PAIN (0)

## 2025-02-26 NOTE — PATIENT INSTRUCTIONS
**For crisis resources, please see the information at the end of this document**   Patient Education    Thank you for coming to the [facility] [place] [facility].     Lab Testing:  If you had lab testing today and your results are reassuring or normal they will be mailed to you or sent through [employer] within 7 days. If the lab tests need quick action we will call you with the results. The phone number we will call with results is # [phone]. If this is not the best number please call our clinic and change the number.     Medication Refills:  If you need any refills please call your pharmacy and they will contact us. Our fax number for refills is [phone].   Three business days of notice are needed for general medication refill requests.   Five business days of notice are needed for controlled substance refill requests.   If you need to change to a different pharmacy, please contact the new pharmacy directly. The new pharmacy will help you get your medications transferred.     Contact Us:  Please call [phone] during business hours (8-5:00 M-F).   If you have medication related questions after clinic hours, or on the weekend, please call [phone].     Financial Assistance [phone]   Medical Records [phone]       MENTAL HEALTH CRISIS RESOURCES:  For a emergency help, please call 911 or go to the nearest Emergency Department.     Emergency Walk-In Options:   [facility] Unit @ [facility] ([place]): [phone] - Specialized mental health emergency area designed to be calming  [facility] ([place]): [phone]  [facility] ([place]): [phone]  [facility]): [phone]    [facility] Crisis Information:   [place]: [phone]  [place]: [phone]  [place] ([place]) - Adult: [phone]     Child: [phone]  [place] - Adult: [phone]     Child: [phone]  [place]:  [phone]  List of all [facility] resources:   https://[place].gov/dhs/people-we-serve/adults/health-care/mental-health/resources/crisis-contacts.jsp    National Crisis Information:   Crisis Text Line: Text  [place]  to 631781  Suicide & Crisis Lifeline: 988  National Suicide Prevention Lifeline: 6-294-478-TALK (1-978.415.7300)       For online chat options, visit https://suicidepreventionlifeline.org/chat/  Poison Control Center: 3-391-098-9946  Trans Lifeline: 5-558-391-3448 - Hotline for transgender people of all ages  The [employer] Project: 2-823-627-4250 - Hotline for LGBT youth     For Non-Emergency Support:   Fast Tracker: Mental Health & Substance Use Disorder Resources -   https://www.[employer].org/
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
## 2025-02-26 NOTE — GROUP NOTE
Psychoeducation Group Note    PATIENT'S NAME: Preethi Mays  MRN:   [id number]  :   1960  ACCT. NUMBER: [id number]  DATE OF SERVICE: 25  START TIME: 10:00 AM  END TIME: 10:50 AM  FACILITATOR: Kaitlyn Gu LICSW; Yocasta Gusman RN  TOPIC: MH Wellness Group: Health Maintenance  [facility]  TRACK: IOP DT 4     NUMBER OF PARTICIPANTS: 9    Summary of Group / Topics Discussed:  Health Maintenance: Eight Dimensions of Wellness: The concept of holistic health through the model of eight dimensions was introduced. Group members participated in identifying behaviors and activities in each of the dimensions of wellness.  The importance of each dimension was reinforced and the concept of balance in life as it relates to wellness was explored.      Patient Session Goals / Objectives:  Verbalized understanding of balance in wellness and how it relates to their life  Identified and explained the eight dimensions of wellness  Categorized activities and wellness needs into corresponding dimensions appropriately during exercise        Patient Participation / Response:  Fully participated with the group by sharing personal reflections / insights and openly received / provided feedback with other participants.    Demonstrated understanding of topics discussed through group discussion and participation, Identified / Expressed personal readiness to practice skills, and Verbalized understanding of health maintenance topic    Treatment Plan:  Patient has a current master individualized treatment plan.  See Epic treatment plan for more information.    KVNG Craven

## 2025-02-26 NOTE — NURSING NOTE
Current patient location:  IOP program  at [facility]    Is the patient currently in the state of [place]? YES    Visit mode: VIDEO    If the visit is dropped, the patient can be reconnected by:VIDEO VISIT: Text to cell phone:   Telephone Information:   Mobile [phone]       Will anyone else be joining the visit? NO  (If patient encounters technical issues they should call [phone]    Are changes needed to the allergy or medication list? No    Are refills needed on medications prescribed by this physician? Would like to discuss medications before refills are sent    Rooming Documentation:  Questionnaire(s) completed    Reason for visit: RECHECK    Sol HICKSF

## 2025-02-26 NOTE — GROUP NOTE
Psychotherapy Group Note    PATIENT'S NAME: Preethi Mays  MRN:   [id number]  :   1960  ACCT. NUMBER: [id number]  DATE OF SERVICE: 25  START TIME: 11:00 AM  END TIME: 11:50 AM  FACILITATOR: Preethi Stone LICSW  TOPIC:  EBP Group: Self-Awareness  [facility]  TRACK: iop/dt4    NUMBER OF PARTICIPANTS: 8    Summary of Group / Topics Discussed:  Self-Awareness: Self-Compassion: Patients received overview of key concepts in developing self-compassion. Patients discussed mindfulness, self-kindness, and finding common humanity. Patients identified their current approach to problems in their lives and learned skills for increasing self-compassion. Patients identified ways they can put self-compassion skills into practice and problem solve barriers to application of skills.     Patient Session Goals / Objectives:  [place] components of self-compassion  Identify ways to practice self-compassion in daily life  Problem solve barriers to self-compassion practice      Patient Participation / Response:  Fully participated with the group by sharing personal reflections / insights and openly received / provided feedback with other participants.    Demonstrated understanding of topics discussed through group discussion and participation and Demonstrated understanding of values, strengths, and challenges to learn about themselves    Treatment Plan:  Patient has a current master individualized treatment plan.  See Epic treatment plan for more information.    KVNG Shields

## 2025-02-26 NOTE — GROUP NOTE
Psychotherapy Group Note    PATIENT'S NAME: Preethi Mays  MRN:   [id number]  :   1960  ACCT. NUMBER: [id number]  DATE OF SERVICE: 25  START TIME: 12:00 PM  END TIME: 12:50 PM  FACILITATOR: Preethi Stone LICSW  TOPIC: MH EBP Group: Emotions Management  [facility]  TRACK: iop/dt4    NUMBER OF PARTICIPANTS: 8    Summary of Group / Topics Discussed:  Emotions Management: Understanding Emotions: Patients discussed the purpose of emotions and function they serve in our lives.  Reviewed core emotions, why they happen (triggers), and how they occur. The group assisted one anothers' understanding that: emotional experiences are important; difficult emotions have a place in our lives; and the differences between various emotions.  Group members were invited to complete structured journaling exercise, focused on emotion identification    Patient Session Goals / Objectives:  Demonstrate understanding of types various emotions  Identify and discuss specific emotions and when they occur; understand triggers  Discuss barriers to emotional regulation      Patient Participation / Response:  Fully participated with the group by sharing personal reflections / insights and openly received / provided feedback with other participants.    Demonstrated understanding of topics discussed through group discussion and participation and Expressed understanding of the relevance / importance of emotions management skills at distressing times in life    Treatment Plan:  Patient has a current master individualized treatment plan.  See Epic treatment plan for more information.    KVNG Shields

## 2025-02-27 ENCOUNTER — HOSPITAL ENCOUNTER (OUTPATIENT)
Dept: BEHAVIORAL HEALTH | Facility: CLINIC | Age: 65
End: 2025-02-27
Attending: PSYCHIATRY & NEUROLOGY
Payer: COMMERCIAL

## 2025-02-27 PROCEDURE — 90853 GROUP PSYCHOTHERAPY: CPT | Performed by: SOCIAL WORKER

## 2025-02-27 PROCEDURE — 90853 GROUP PSYCHOTHERAPY: CPT

## 2025-02-27 NOTE — GROUP NOTE
Psychotherapy Group Note    PATIENT'S NAME: Preethi Mays  MRN:   [id number]  :   1960  ACCT. NUMBER: [id number]  DATE OF SERVICE: 25  START TIME: 12:00 PM  END TIME: 12:50 PM  FACILITATOR: Preethi Stone LICSW  TOPIC:  EBP Group: Behavioral Activation  [facility]  TRACK: iop/dt4    NUMBER OF PARTICIPANTS: 8    Summary of Group / Topics Discussed:  Behavioral Activation: Behavior Chain Analysis: Patients explored the steps leading up to identified unwanted behaviors, and ways to replace them with more effective behaviors. Patients examined factors contributing to unwanted behaviors, with a goal of determining ways to interrupt the unhealthy patterns.    Patient Session Goals / Objectives:  Identify thoughts, feelings, and actions that contribute to unwanted behaviors  Identify thoughts, feelings, and actions that contribute to more effective/healthy and desired behaviors  Make plans to track and implement changes and share experiences in group  Identify personal barriers to change      Patient Participation / Response:  Fully participated with the group by sharing personal reflections / insights and openly received / provided feedback with other participants.    Demonstrated understanding of topics discussed through group discussion and participation and Expressed understanding of the relationship between behaviors, thoughts, and feelings    Treatment Plan:  Patient has a current master individualized treatment plan.  See Epic treatment plan for more information.    KVNG Shields

## 2025-02-27 NOTE — GROUP NOTE
Psychotherapy Group Note    PATIENT'S NAME: Preethi Mays  MRN:   [id number]  :   1960  ACCT. NUMBER: [id number]  DATE OF SERVICE: 25  START TIME: 11:00 AM  END TIME: 11:50 AM  FACILITATOR: Preethi Stone LICSW  TOPIC:  EBP Group: Self-Awareness  [facility]  TRACK: iop/dt4    NUMBER OF PARTICIPANTS: 8    Summary of Group / Topics Discussed:  Self-Awareness: Grief: Patients were provided with an overview of how personal losses impact their thoughts, feelings, and behaviors. Different stages of grief were discussed, with a focus on the personal and individual experiences of grief as a natural response to loss. The relationship between grief, depression, and anxiety was also discussed. Patients were provided with information regarding different ways of processing grief and shared their personal experiences.     Patient Session Goals / Objectives:  Defined and explored the concept of grief and the grieving process  Discussed relationship between grief, depression, and anxiety   Normalized and recognized the purpose/benefits of the grieving process  Discussed management of the thoughts and feelings associated with grief      Patient Participation / Response:  Fully participated with the group by sharing personal reflections / insights and openly received / provided feedback with other participants.    Demonstrated understanding of topics discussed through group discussion and participation and Demonstrated understanding of values, strengths, and challenges to learn about themselves    Treatment Plan:  Patient has a current master individualized treatment plan.  See Epic treatment plan for more information.    KVNG Shields

## 2025-02-27 NOTE — GROUP NOTE
Process Group Note    PATIENT'S NAME: Preethi Mays  MRN:   [id number]  :   1960  ACCT. NUMBER: [id number]  DATE OF SERVICE: 25  START TIME: 10:00 AM  END TIME: 10:50 AM  FACILITATOR: Preethi Stone LICSW  TOPIC:  Process Group    Diagnoses:  296.32 (F33.1) Major Depressive Disorder, Recurrent Episode, Moderate _ and With anxious distress.  3. Other Diagnoses that is relevant to services:   300.00 (F41.9) Unspecified Anxiety Disorder  309.81 (F43.10) Posttraumatic Stress Disorder (includes Posttraumatic Stress Disorder for Children 6 Years and Younger)  With dissociative symptoms.    [facility] Outpatient [facility]  TRACK: iop/dt4    NUMBER OF PARTICIPANTS: 7        Data:    Session content: At the start of this group, patients were invited to check in by identifying themselves, describing their current emotional status, and identifying issues to address in this group.   Area(s) of treatment focus addressed in this session included Symptom Management and Personal Safety.  Whit reports feeling anxious, grieving a relationship rupture.  States that she  is feeling more settled in this new group.  Grateful for conversation with sister.  Engaged in group    Therapeutic Interventions/Treatment Strategies:  Psychotherapist offered support, feedback and validation and reinforced use of skills. Treatment modalities used include Cognitive Behavioral Therapy and Dialectical Behavioral Therapy.    Assessment:    Patient response:   Patient responded to session by accepting feedback, giving feedback, listening, focusing on goals, being attentive, accepting support, appearing alert, demonstrating behavior change, and verbalizing understanding    Possible barriers to participation / learning include: and no barriers identified    Health Issues:   None reported       Substance Use Review:   Substance Use: No active concerns identified.    Mental Status/Behavioral  Observations  Appearance:   Appropriate   Eye Contact:   Good   Psychomotor Behavior: Normal   Attitude:   Cooperative   Orientation:   All  Speech   Rate / Production: Normal    Volume:  Normal   Mood:    Anxious  Normal  Affect:    Appropriate   Thought Content:   Clear  Thought Form:  Coherent  Goal Directed  Logical     Insight:    Good     Plan:   Safety Plan: No current safety concerns identified.  Recommended that patient call 911 or go to the local ED should there be a change in any of these risk factors.   Barriers to treatment: None identified  Patient Contracts (see media tab):  None  Substance Use: Not addressed in session   Continue or Discharge: Patient will continue in Adult Day Treatment (ADT)  as planned. Patient is likely to benefit from learning and using skills as they work toward the goals identified in their treatment plan.      Preethi Stone, [facility]  February 27, 2025

## 2025-02-27 NOTE — GROUP NOTE
Psychoeducation Group Note    PATIENT'S NAME: Preethi Mays  MRN:   [id number]  :   1960  ACCT. NUMBER: [id number]  DATE OF SERVICE: 25  START TIME:  9:00 AM  END TIME:  9:50 AM  FACILITATOR: Indra Mercer LPC; Cate Levy OTR  TOPIC: MH Life Skills Group: Resiliency Development  [facility] Outpatient [facility]  TRACK: IOP/DT-4 Trauma    NUMBER OF PARTICIPANTS: 7    Summary of Group / Topics Discussed:  Resiliency Development: Wellness: Provided education on wellness as an active process to reach a state of health and fulfillment. Facilitated discussion on the holistic understanding of wellness (physical, emotional, social, spiritual, mental, and environmental) and talked collaboratively about their personal ideas of wellness. Facilitated a self-reflective process where patients identified wellness values and goals through a creative expression task. Patients shared with the group their wellness vision board and reasoning behind their wellness visions. Validation and support provided throughout group process.     Patient Session Goals / Objectives:   Discuss the topic of wellness in a collaborative, supportive manner.   Identify personal wellness motives and goals.   Engage in an experiential intervention to explore wellness in an individualized manner.   Reflect on the process and share insight around their engagement in the creative expression task.       Patient Participation / Response:  Fully participated with the group by sharing personal reflections / insights and openly received / provided feedback with other participants.    Verbalized understanding of content, Patient would benefit from additional opportunities to practice the content to be able to generalize it to their everyday life with increased intentionality, consistency, and efficacy in support of their psychiatric recovery, and Patient worked towards initial treatment plan goals     Treatment  Plan:  Patient has a current master individualized treatment plan.  See Epic treatment plan for more information.    Indra Mercer, LPC

## 2025-03-04 ENCOUNTER — HOSPITAL ENCOUNTER (OUTPATIENT)
Dept: BEHAVIORAL HEALTH | Facility: CLINIC | Age: 65
End: 2025-03-04
Attending: PSYCHIATRY & NEUROLOGY
Payer: COMMERCIAL

## 2025-03-04 PROCEDURE — 90853 GROUP PSYCHOTHERAPY: CPT

## 2025-03-04 PROCEDURE — 90853 GROUP PSYCHOTHERAPY: CPT | Performed by: SOCIAL WORKER

## 2025-03-04 NOTE — GROUP NOTE
Psychoeducation Group Note    PATIENT'S NAME: Preethi Mays  MRN:   [id number]  :   1960  ACCT. NUMBER: [id number]  DATE OF SERVICE: 3/04/25  START TIME:  9:00 AM  END TIME:  9:50 AM  FACILITATOR: Indra Mercer LPC; Cate Levy OTR  TOPIC: MH Life Skills Group: Sensory Approaches in Mental Health  [facility] Outpatient Programs  TRACK: IOP/DT-4 TRAUMA    NUMBER OF PARTICIPANTS: 7    Topics Discussed:  Sensory Approaches in Mental Health: Comfort Spaces:   Patients were educated on the autonomic nervous system as a way to understand how the body reacts to feelings of stress, danger, and safety. Patients discussed the importance of safety for our body and for our overall mental wellness. Patients identified what makes them feel safe in their environment while exploring the 8 senses. Patient s self-reflected on what a comfort space (a safe place) for them would look like in their everyday life to decrease stress and improve self-regulation. Patients were provided with an experiential learning opportunity to increase awareness of their senses to create a supportive, healthy environment. Validation, support, and feedback were provided.     Patient Session Goals and Objectives:   Identified parts of a physical environment to increase feelings of safety.   Improved awareness of all 8 senses for self-regulation.   Established a plan to create a safe space in their physical environment.   Discussed with the group on how to generalize taught skills to their everyday life.       Patient Participation / Response:  Fully participated with the group by sharing personal reflections / insights and openly received / provided feedback with other participants.    Verbalized understanding of content, Patient would benefit from additional opportunities to practice the content to be able to generalize it to their everyday life with increased intentionality, consistency, and efficacy in  support of their psychiatric recovery, and Patient worked towards initial treatment plan goals     Treatment Plan:  Patient has a current master individualized treatment plan.  See Epic treatment plan for more information.    Indra Mercer, LPC

## 2025-03-04 NOTE — GROUP NOTE
Psychotherapy Group Note    PATIENT'S NAME: Preethi Mays  MRN:   [id number]  :   1960  ACCT. NUMBER: [id number]  DATE OF SERVICE: 3/04/25  START TIME: 12:00 PM  END TIME: 12:50 PM  FACILITATOR: Preethi Stone LICSW  TOPIC: MH EBP Group: Cognitive Restructuring  [facility] Outpatient Programs  TRACK: iop/dt4    NUMBER OF PARTICIPANTS: 7    Summary of Group / Topics Discussed:  Cognitive Restructuring: Distortions: Patients received an overview of how to identify common cognitive distortions. Patients will explore alternatives to cognitive distortions and practice challenging their negative thought patterns. The goal is to help patients target modify ineffective thought patterns.     Patient Session Goals / Objectives:  Familiarized self with ineffective / unhealthy thoughts and how they develop.    Explored impact of ineffective thoughts / distortions on mood and activity  Formulated new neutral/positive alternatives to challenge less helpful / ineffective thoughts.  Practiced and plan to apply in daily life             Patient Participation / Response:  Fully participated with the group by sharing personal reflections / insights and openly received / provided feedback with other participants.    Demonstrated understanding of topics discussed through group discussion and participation and Expressed understanding of the relationship between behaviors, thoughts, and feelings    Treatment Plan:  Patient has a current master individualized treatment plan.  See Epic treatment plan for more information.    KVNG Shields

## 2025-03-04 NOTE — GROUP NOTE
Psychotherapy Group Note    PATIENT'S NAME: Preethi Mays  MRN:   [id number]  :   1960  ACCT. NUMBER: [id number]  DATE OF SERVICE: 3/04/25  START TIME: 11:00 AM  END TIME: 11:50 AM  FACILITATOR: Preethi Stone LICSW  TOPIC:  EBP Group: Cognitive Restructuring  [facility] Outpatient Programs  TRACK: iop/dt4    NUMBER OF PARTICIPANTS: 7    Summary of Group / Topics Discussed:  Cognitive Restructuring: Introduction and Overview: Patients were introduced to Cognitive Behavioral Therapy (CBT) as a way to identify ineffective thought patterns, understand factors that contribute to ineffective thought patterns, gain awareness of the impact of those thoughts on emotions and behavior, and learn methods for modifying thinking to achieve better mood regulation. Patients received a general overview of how ones thoughts influence feelings and behaviors in the context of CBT. Patients explored the development of their own thought patterns and how they impact daily functioning.      Patient Session Goals / Objectives:  Familiarize self with the interrelationship of thoughts, feelings and behavior.  Identify ineffective / unhelpful thought patterns and their impact on mood.             Patient Participation / Response:  Fully participated with the group by sharing personal reflections / insights and openly received / provided feedback with other participants.    Demonstrated understanding of topics discussed through group discussion and participation and Expressed understanding of the relationship between behaviors, thoughts, and feelings    Treatment Plan:  Patient has a current master individualized treatment plan.  See Epic treatment plan for more information.    KVNG Shields

## 2025-03-04 NOTE — GROUP NOTE
Process Group Note    PATIENT'S NAME: Preethi Mays  MRN:   [id number]  :   1960  ACCT. NUMBER: [id number]  DATE OF SERVICE: 3/04/25  START TIME: 10:00 AM  END TIME: 10:50 AM  FACILITATOR: Preethi Stone LICSW  TOPIC:  Process Group    Diagnoses:  296.32 (F33.1) Major Depressive Disorder, Recurrent Episode, Moderate _ and With anxious distress.  3. Other Diagnoses that is relevant to services:   300.00 (F41.9) Unspecified Anxiety Disorder  309.81 (F43.10) Posttraumatic Stress Disorder (includes Posttraumatic Stress Disorder for Children 6 Years and Younger)  With dissociative symptoms.    [facility] Adult [facility] Outpatient Programs  TRACK: iop/dt4    NUMBER OF PARTICIPANTS: 7        Data:    Session content: At the start of this group, patients were invited to check in by identifying themselves, describing their current emotional status, and identifying issues to address in this group.   Area(s) of treatment focus addressed in this session included Symptom Management and Personal Safety.  Whit states that she is proud today for having worked on  goals that include navigating different relationships.  Took time in group to share poem about grief of loss of relationship with grandson.      Therapeutic Interventions/Treatment Strategies:  Psychotherapist offered support, feedback and validation and reinforced use of skills. Treatment modalities used include Cognitive Behavioral Therapy and Dialectical Behavioral Therapy. Interventions include Other: Assisted patient  in finding ways to adapt functioning in light of past traumatic experiences and Relationship Skills: Discussed strategies to promote healthier understanding of interpersonal relationships.    Assessment:    Patient response:   Patient responded to session by accepting feedback, giving feedback, listening, focusing on goals, being attentive, accepting support, appearing alert, demonstrating behavior change, and verbalizing  understanding    Possible barriers to participation / learning include: and no barriers identified    Health Issues:   None reported       Substance Use Review:   Substance Use: No active concerns identified.    Mental Status/Behavioral Observations  Appearance:   Appropriate   Eye Contact:   Good   Psychomotor Behavior: Normal   Attitude:   Cooperative   Orientation:   All  Speech   Rate / Production: Normal    Volume:  Normal   Mood:    Normal  Affect:    Appropriate   Thought Content:   Clear  Thought Form:  Coherent  Goal Directed  Logical     Insight:    Good     Plan:   Safety Plan: No current safety concerns identified.  Recommended that patient call 911 or go to the local ED should there be a change in any of these risk factors.   Barriers to treatment: None identified  Patient Contracts (see media tab):  None  Substance Use: Not addressed in session   Continue or Discharge: Patient will continue in Adult Day Treatment (ADT)  as planned. Patient is likely to benefit from learning and using skills as they work toward the goals identified in their treatment plan.      KVNG Shields  March 4, 2025

## 2025-03-06 ENCOUNTER — HOSPITAL ENCOUNTER (OUTPATIENT)
Dept: BEHAVIORAL HEALTH | Facility: CLINIC | Age: 65
End: 2025-03-06
Attending: PSYCHIATRY & NEUROLOGY
Payer: COMMERCIAL

## 2025-03-06 PROCEDURE — 90853 GROUP PSYCHOTHERAPY: CPT

## 2025-03-06 PROCEDURE — 90853 GROUP PSYCHOTHERAPY: CPT | Performed by: SOCIAL WORKER

## 2025-03-06 NOTE — GROUP NOTE
"Process Group Note    PATIENT'S NAME: Preethi Mays  MRN:   [id number]  :   1960  ACCT. NUMBER: [id number]  DATE OF SERVICE: 3/06/25  START TIME: 10:00 AM  END TIME: 10:50 AM  FACILITATOR: Preethi Stone LICSW  TOPIC:  Process Group    Diagnoses:  296.32 (F33.1) Major Depressive Disorder, Recurrent Episode, Moderate _ and With anxious distress.  3. Other Diagnoses that is relevant to services:   300.00 (F41.9) Unspecified Anxiety Disorder  309.81 (F43.10) Posttraumatic Stress Disorder (includes Posttraumatic Stress Disorder for Children 6 Years and Younger)  With dissociative symptoms.    [facility] Outpatient [facility]  TRACK: iop/dt4    NUMBER OF PARTICIPANTS: 8        Data:    Session content: At the start of this group, patients were invited to check in by identifying themselves, describing their current emotional status, and identifying issues to address in this group.   Area(s) of treatment focus addressed in this session included Symptom Management and Personal Safety.  Whit reports feeling both hope and \"unfocused\" today She is able to identify loy, both with dog in snow this am and in anticipation of grandchild.  Declined to take time and was engaged in group    Therapeutic Interventions/Treatment Strategies:  Psychotherapist offered support, feedback and validation and reinforced use of skills. Treatment modalities used include Cognitive Behavioral Therapy and Dialectical Behavioral Therapy.    Assessment:    Patient response:   Patient responded to session by accepting feedback, giving feedback, listening, focusing on goals, being attentive, accepting support, appearing alert, demonstrating behavior change, and verbalizing understanding    Possible barriers to participation / learning include: and no barriers identified    Health Issues:   None reported       Substance Use Review:   Substance Use: No active concerns identified.    Mental Status/Behavioral " Observations  Appearance:   Appropriate   Eye Contact:   Good   Psychomotor Behavior: Normal   Attitude:   Cooperative   Orientation:   All  Speech   Rate / Production: Normal    Volume:  Normal   Mood:    Normal hopeful  Affect:    Appropriate   Thought Content:   Clear  Thought Form:  Coherent  Goal Directed  Logical     Insight:    Good     Plan:   Safety Plan: No current safety concerns identified.  Recommended that patient call 911 or go to the local ED should there be a change in any of these risk factors.   Barriers to treatment: None identified  Patient Contracts (see media tab):  None  Substance Use: Not addressed in session   Continue or Discharge: Patient will continue in Adult Day Treatment (ADT)  as planned. Patient is likely to benefit from learning and using skills as they work toward the goals identified in their treatment plan.      Preethi Stone, [facility]  March 6, 2025

## 2025-03-06 NOTE — GROUP NOTE
Psychotherapy Group Note    PATIENT'S NAME: Preethi Mays  MRN:   [id number]  :   1960  ACCT. NUMBER: [id number]  DATE OF SERVICE: 3/06/25  START TIME: 12:00 PM  END TIME: 12:50 PM  FACILITATOR: Preethi Stone LICSW  TOPIC: MH EBP Group: Specialty Awareness  [facility] Adult Mental [facility] Outpatient Programs  TRACK: iop/dt4    NUMBER OF PARTICIPANTS: 6    Summary of Group / Topics Discussed:  Specialty Topics: Goal Setting: Provided education and assessment on patient's group programming goals. Evaluated patient's assessment of their ability to participate in groups, share emotions with group members, and openness to the group format. Provided education regarding appropriate individual-based group therapy goals.     Patient Session Goals and Objectives:  -Participate in reflective assessment of group readiness.  -Understand appropriate topics and methods to discuss those topics in group programming.  -Identify and problem solve barriers to group participation.  -Identify strategies to actively cope while engaging in group programming.   -Reflected up individualized treatment plans  -Meet with members of the treatment team to assess goal progress       Patient Participation / Response:  Fully participated with the group by sharing personal reflections / insights and openly received / provided feedback with other participants.    Demonstrated understanding of topics discussed through group discussion and participation and Practiced skills in session    Treatment Plan:  Patient has a current master individualized treatment plan.  See Epic treatment plan for more information.    KVNG Shields

## 2025-03-06 NOTE — GROUP NOTE
Psychotherapy Group Note    PATIENT'S NAME: Preethi Mays  MRN:   [id number]  :   1960  ACCT. NUMBER: [id number]  DATE OF SERVICE: 3/06/25  START TIME: 11:00 AM  END TIME: 11:50 AM  FACILITATOR: Preethi Stone LICSW  TOPIC: MH EBP Group: Specialty Awareness  [facility] Adult Mental Health Outpatient Programs  TRACK: iop/dt4    NUMBER OF PARTICIPANTS: 8    Summary of Group / Topics Discussed:  Part 1  Specialty Topics: Goal Setting: Provided education and assessment on patient's group programming goals. Evaluated patient's assessment of their ability to participate in groups, share emotions with group members, and openness to the group format. Provided education regarding appropriate individual-based group therapy goals.     Patient Session Goals and Objectives:  -Participate in reflective assessment of group readiness.  -Understand appropriate topics and methods to discuss those topics in group programming.  -Identify and problem solve barriers to group participation.  -Identify strategies to actively cope while engaging in group programming.   -Reflected up individualized treatment plans  -Meet with members of the treatment team to assess goal progress       Patient Participation / Response:  Fully participated with the group by sharing personal reflections / insights and openly received / provided feedback with other participants.    Demonstrated understanding of topics discussed through group discussion and participation and Identified / Expressed readiness to act on skill suggestions discussed in topic    Treatment Plan:  Patient has a current master individualized treatment plan.  See Epic treatment plan for more information.    KVNG Shields

## 2025-03-06 NOTE — GROUP NOTE
Psychoeducation Group Note    PATIENT'S NAME: Preethi Mays  MRN:   [id number]  :   1960  ACCT. NUMBER: [id number]  DATE OF SERVICE: 3/06/25  START TIME:  9:00 AM  END TIME:  9:50 AM  FACILITATOR: Indra Mercer LPC; Opal Mendoza OTR  TOPIC:  Life Skills Group: Life Skills  [facility] Out[facility]  TRACK: IOP/DT-4 TRAUMA  NUMBER OF PARTICIPANTS: 8     TOPICS DISCUSSED:  Life Skills Clinic: Provided opportunity for patients to independently choose and engage in a therapeutic activity that supports progress towards their goals and psychiatric recovery. Discussed the skill of behavioral activation and acting opposite to emotion to improve motivation in everyday life tasks. The Life Skills Clinic provides a context for patients to monitor their symptoms, gain self-awareness, practice skills (self-regulation, mindfulness, self-talk, focus/concentration, social, productivity), build a sense of self efficacy and mastery, as well as receive validation, support, and resources. Staff checks in, observes, assesses, and monitors performance skills and patterns in context with each group member.      Patient Session Goals / Objectives:   Independently identify a purposeful and meaningful therapeutic activity.   Identified which goal(s) they are intentionally working on during session.    Reflected on their performance and share insight about their progress.   Practiced and identified a way to generalize a skill to their everyday life.      Patient Participation / Response:  Fully participated with the group by sharing personal reflections / insights and openly received / provided feedback with other participants.    Verbalized understanding of content, Patient would benefit from additional opportunities to practice the content to be able to generalize it to their everyday life with increased intentionality, consistency, and efficacy in support of their psychiatric recovery,  and Patient worked towards initial treatment plan goals     Treatment Plan:  Patient has a current master individualized treatment plan.  See Epic treatment plan for more information.    Indra Mercer, LPC

## 2025-03-07 ENCOUNTER — HOSPITAL ENCOUNTER (OUTPATIENT)
Dept: BEHAVIORAL HEALTH | Facility: CLINIC | Age: 65
Discharge: HOME | End: 2025-03-07
Attending: PSYCHIATRY & NEUROLOGY
Payer: COMMERCIAL

## 2025-03-07 DIAGNOSIS — F33.1 MODERATE EPISODE OF RECURRENT MAJOR DEPRESSIVE DISORDER (H): Primary | ICD-10-CM

## 2025-03-07 PROCEDURE — 90853 GROUP PSYCHOTHERAPY: CPT | Performed by: SOCIAL WORKER

## 2025-03-07 PROCEDURE — 90853 GROUP PSYCHOTHERAPY: CPT

## 2025-03-07 NOTE — GROUP NOTE
Psychoeducation Group Note    PATIENT'S NAME: Preethi Mays  MRN:   [id number]  :   1960  ACCT. NUMBER: [id number]  DATE OF SERVICE: 3/07/25  START TIME:  9:00 AM  END TIME:  9:50 AM  FACILITATOR: Preethi Stone LICSW; Yocasta Gusman RN  TOPIC:  Wellness Group: [facility] Adult Mental Health Outpatient Programs  TRACK: iop/dt4    NUMBER OF PARTICIPANTS: 8    Summary of Group / Topics Discussed:  Foundations [facility] Health: Sleep: Case study/sleep hygiene: Patients explored the connection between sleep and mental illness. Patients learned about how adequate sleep can improve health, productivity, wellness, quality of life, and safety.     Patient Session Goals / Objectives:  Demonstrated understanding of sleep hygiene practices and benefits of sleep  Identified sleep hygiene strategies to utilize   Described the connection between sleep disturbances and mental illness      Patient Participation / Response:  Fully participated with the group by sharing personal reflections / insights and openly received / provided feedback with other participants.    Demonstrated understanding of topics discussed through group discussion and participation and Identified / Expressed personal readiness to practice skills    Treatment Plan:  Patient has a current master individualized treatment plan.  See Epic treatment plan for more information.    KVNG Shields

## 2025-03-07 NOTE — GROUP NOTE
Psychotherapy Group Note    PATIENT'S NAME: Preethi Mays  MRN:   [id number]  :   1960  ACCT. NUMBER: [id number]  DATE OF SERVICE: 3/07/25  START TIME: 10:00 AM  END TIME: 10:50 AM  FACILITATOR: Preethi Stone LICSW  TOPIC:  EBP Group: [facility] Outpatient [facility]  TRACK: iop/dt4    NUMBER OF PARTICIPANTS: 8    Summary of Group / Topics Discussed:  Mindfulness: What is Mindfulness: Patients received an overview on what mindfulness is and how mindfulness can benefit general health, mental health symptoms, and stressors. The history of mindfulness, its application to mental health therapies, and key concepts were also discussed. Patients discussed current awareness, knowledge, and practice of mindfulness skills. Patients also discussed barriers to mindfulness practice.     Patient Session Goals / Objectives:  Demonstrated understanding of key concepts and application to daily life  Identified when/how to use mindfulness   Resolved barriers to practice  Identified plan to use mindfulness in daily life      Patient Participation / Response:  Fully participated with the group by sharing personal reflections / insights and openly received / provided feedback with other participants.    Demonstrated understanding of topics discussed through group discussion and participation and Demonstrated understanding of mindfulness skills and benefits of practice    Treatment Plan:  Patient has a current master individualized treatment plan.  See Epic treatment plan for more information.    KVNG Shields

## 2025-03-07 NOTE — GROUP NOTE
"Process Group Note    PATIENT'S NAME: Preethi Mays  MRN:   [id number]  :   1960  ACCT. NUMBER: [id number]  DATE OF SERVICE: 3/07/25  START TIME: 11:00 AM  END TIME: 11:50 AM  FACILITATOR: Preethi Stone LICSW  TOPIC:  Process Group    Diagnoses:  296.32 (F33.1) Major Depressive Disorder, Recurrent Episode, Moderate _ and With anxious distress.  3. Other Diagnoses that is relevant to services:   300.00 (F41.9) Unspecified Anxiety Disorder  309.81 (F43.10) Posttraumatic Stress Disorder (includes Posttraumatic Stress Disorder for Children 6 Years and Younger)  With dissociative symptoms.    [facility] Outpatient Programs  TRACK: iop/dt4    NUMBER OF PARTICIPANTS: 8        Data:    Session content: At the start of this group, patients were invited to check in by identifying themselves, describing their current emotional status, and identifying issues to address in this group.   Area(s) of treatment focus addressed in this session included Symptom Management and Personal Safety.  Whit describes feeling \"bad\" and identifies all or nothing thinking.  Takes time in group to share message that daughter had sent to her  and triggering anger and sadness at the disruption in mother/daughter relationship and lack of involvement with grandson.    Therapeutic Interventions/Treatment Strategies:  Psychotherapist offered support, feedback and validation and reinforced use of skills. Treatment modalities used include Cognitive Behavioral Therapy and Dialectical Behavioral Therapy. Interventions include Other: Assisted patient  in finding ways to adapt functioning in light of past traumatic experiences and Relationship Skills: Discussed strategies to promote healthier understanding of interpersonal relationships.    Assessment:    Patient response:   Patient responded to session by accepting feedback, giving feedback, listening, focusing on goals, being attentive, accepting support, " appearing alert, demonstrating behavior change, and verbalizing understanding    Possible barriers to participation / learning include: and no barriers identified    Health Issues:   None reported       Substance Use Review:   Substance Use: No active concerns identified.    Mental Status/Behavioral Observations  Appearance:   Appropriate   Eye Contact:   Good   Psychomotor Behavior: Normal   Attitude:   Cooperative   Orientation:   All  Speech   Rate / Production: Normal    Volume:  Normal   Mood:    Anxious  Depressed  Irritable  Normal  Affect:    Appropriate   Thought Content:   Clear  Thought Form:  Coherent  Goal Directed  Logical     Insight:    Good     Plan:   Safety Plan: No current safety concerns identified.  Recommended that patient call 911 or go to the local ED should there be a change in any of these risk factors.   Barriers to treatment: None identified  Patient Contracts (see media tab):  None  Substance Use: Not addressed in session   Continue or Discharge: Patient will continue in Adult Day Treatment (ADT)  as planned. Patient is likely to benefit from learning and using skills as they work toward the goals identified in their treatment plan.      Preethi Stone [facility]  March 7, 2025

## 2025-03-11 ENCOUNTER — HOSPITAL ENCOUNTER (OUTPATIENT)
Dept: BEHAVIORAL HEALTH | Facility: CLINIC | Age: 65
End: 2025-03-11
Attending: PSYCHIATRY & NEUROLOGY
Payer: COMMERCIAL

## 2025-03-11 PROCEDURE — 90853 GROUP PSYCHOTHERAPY: CPT

## 2025-03-11 PROCEDURE — 90853 GROUP PSYCHOTHERAPY: CPT | Performed by: SOCIAL WORKER

## 2025-03-11 ASSESSMENT — COLUMBIA-SUICIDE SEVERITY RATING SCALE - C-SSRS
5. HAVE YOU STARTED TO WORK OUT OR WORKED OUT THE DETAILS OF HOW TO KILL YOURSELF? DO YOU INTEND TO CARRY OUT THIS PLAN?: NO
1. SINCE LAST CONTACT, HAVE YOU WISHED YOU WERE DEAD OR WISHED YOU COULD GO TO SLEEP AND NOT WAKE UP?: NO
2. HAVE YOU ACTUALLY HAD ANY THOUGHTS OF KILLING YOURSELF?: NO

## 2025-03-11 NOTE — GROUP NOTE
Psychoeducation Group Note    PATIENT'S NAME: Preethi Mays  MRN:   [id number]  :   1960  ACCT. NUMBER: [id number]  DATE OF SERVICE: 3/11/25  START TIME:  9:00 AM  END TIME:  9:50 AM  FACILITATOR: Indra Mercer LPC; Cate Levy OTR  TOPIC:  Life Skills Group: Life Skills  [facility] Adult Mental Health Outpatient Programs  TRACK: IOP/DT-4 TRAUMA  NUMBER OF PARTICIPANTS: 7    Summary of Group / Topics Discussed:  Life Skills: Life Skills Clinic: Provided opportunity for patients to independently choose and engage in a therapeutic activity that supports progress towards their goals and psychiatric recovery. Discussed the skill of behavioral activation and acting opposite to emotion to improve motivation in everyday life tasks. The Life Skills Clinic provides a context for patients to monitor their symptoms, gain self-awareness, practice skills (self-regulation, mindfulness, self-talk, focus/concentration, social, productivity), build a sense of self efficacy and mastery, as well as receive validation, support, and resources. Staff checks in, observes, assesses, and monitors performance skills and patterns in context with each group member.      Patient Session Goals / Objectives:   Independently identify a purposeful and meaningful therapeutic activity.   Identified which goal(s) they are intentionally working on during session.    Reflected on their performance and share insight about their progress.   Practiced and identified a way to generalize a skill to their everyday life.       Patient Participation / Response:  Fully participated with the group by sharing personal reflections / insights and openly received / provided feedback with other participants.    Verbalized understanding of content, Patient would benefit from additional opportunities to practice the content to be able to generalize it to their everyday life with increased intentionality, consistency, and efficacy in  support of their psychiatric recovery, and Patient worked towards initial treatment plan goals     Treatment Plan:  Patient has a current master individualized treatment plan.  See Epic treatment plan for more information.    Indra Mercer, LPC

## 2025-03-11 NOTE — GROUP NOTE
"Process Group Note    PATIENT'S NAME: Preethi Mays  MRN:   [id number]  :   1960  ACCT. NUMBER: [id number]  DATE OF SERVICE: 3/11/25  START TIME: 10:00 AM  END TIME: 10:50 AM  FACILITATOR: Preethi Stone LICSW  TOPIC:  Process Group    Diagnoses:  296.32 (F33.1) Major Depressive Disorder, Recurrent Episode, Moderate _ and With anxious distress.  3. Other Diagnoses that is relevant to services:   300.00 (F41.9) Unspecified Anxiety Disorder  309.81 (F43.10) Posttraumatic Stress Disorder (includes Posttraumatic Stress Disorder for Children 6 Years and Younger)  With dissociative symptoms.    [facility] Adult Mental Health Outpatient Programs  TRACK: iop/dt4    NUMBER OF PARTICIPANTS: 7        Data:    Session content: At the start of this group, patients were invited to check in by identifying themselves, describing their current emotional status, and identifying issues to address in this group.   Area(s) of treatment focus addressed in this session included Symptom Management and Personal Safety.  Whit states that she is \"feeling better\". She states she utilized support of  in grounding after \"spiraling over weekend.  She states that she was able to sleep last night and that feeling rested is an unusual feeling     Therapeutic Interventions/Treatment Strategies:  Psychotherapist offered support, feedback and validation and reinforced use of skills. Treatment modalities used include Cognitive Behavioral Therapy and Dialectical Behavioral Therapy.    Assessment:    Patient response:   Patient responded to session by accepting feedback, giving feedback, listening, focusing on goals, being attentive, accepting support, appearing alert, demonstrating behavior change, and verbalizing understanding    Possible barriers to participation / learning include: and no barriers identified    Health Issues:   None reported       Substance Use Review:   Substance Use: No active concerns " identified.    Mental Status/Behavioral Observations  Appearance:   Appropriate   Eye Contact:   Good   Psychomotor Behavior: Normal   Attitude:   Cooperative   Orientation:   All  Speech   Rate / Production: Normal    Volume:  Normal   Mood:    Anxious  Depressed  Normal  Affect:    Appropriate   Thought Content:   Clear  Thought Form:  Coherent  Goal Directed  Logical     Insight:    Good     Plan:   Safety Plan: No current safety concerns identified.  Recommended that patient call 911 or go to the local ED should there be a change in any of these risk factors.   Barriers to treatment: None identified  Patient Contracts (see media tab):  None  Substance Use: Not addressed in session   Continue or Discharge: Patient will continue in Adult Day Treatment (ADT)  as planned. Patient is likely to benefit from learning and using skills as they work toward the goals identified in their treatment plan.      Preethi Stone, [facility]  March 11, 2025

## 2025-03-11 NOTE — GROUP NOTE
Psychotherapy Group Note    PATIENT'S NAME: Preethi Mays  MRN:   [id number]  :   1960  ACCT. NUMBER: [id number]  DATE OF SERVICE: 3/11/25  START TIME: 12:00 PM  END TIME: 12:50 PM  FACILITATOR: Preethi Stone LICSW  TOPIC: MH EBP Group: Specialty Awareness  [facility] Adult Mental Health Outpatient Programs  TRACK: iop/dt4    NUMBER OF PARTICIPANTS: 7    Summary of Group / Topics Discussed:  Specialty Topics: Trauma and PTSD: Patients were provided with information to understand the types and origins of trauma, the relationship between trauma and PTSD, the scope of Trauma-Informed Care, and treatment options. Patients were able to explore how trauma may have impacted their functioning directly or indirectly, with reference to the the complexity of trauma and associated treatment options. Patients reviewed their current awareness of this topic and relevance to their functioning. Individual experiences with symptoms and treatment options were discussed. Discussed identification of trauma responses/symptoms and role these responses have had in maintaining safety.    Patient Session Goals / Objectives:  Discussed definitions of trauma, Trauma-Informed Care, PTSD  Discussed how traumatic experiences affect the individual and their relationships (directly, indirectly, brain development and function)  Identified how a history of trauma or exposure to trauma may impact group work  Assisted patients to find ways to adapt functioning in light of past traumatic experience(s), and to identify suitable treatment options and community resources  Identify symptoms of trauma      Patient Participation / Response:  Fully participated with the group by sharing personal reflections / insights and openly received / provided feedback with other participants.    Demonstrated understanding of topics discussed through group discussion and participation and Identified / Expressed readiness to act on skill  suggestions discussed in topic    Treatment Plan:  Patient has a current master individualized treatment plan.  See Epic treatment plan for more information.    KVNG Shields

## 2025-03-11 NOTE — GROUP NOTE
Psychotherapy Group Note    PATIENT'S NAME: Preethi Mays  MRN:   [id number]  :   1960  ACCT. NUMBER: [id number]  DATE OF SERVICE: 3/11/25  START TIME: 11:00 AM  END TIME: 11:50 AM  FACILITATOR: Preethi Stone LICSW  TOPIC:  EBP Group: Specialty Awareness  [facility] Adult Mental Health Outpatient Programs  TRACK: iop/dt4    NUMBER OF PARTICIPANTS: 7    Summary of Group / Topics Discussed:  Intro to Trauma  Specialty Topics: Trauma and PTSD: Patients were provided with information to understand the types and origins of trauma, the relationship between trauma and PTSD, the scope of Trauma-Informed Care, and treatment options. Patients were able to explore how trauma may have impacted their functioning directly or indirectly, with reference to the the complexity of trauma and associated treatment options. Patients reviewed their current awareness of this topic and relevance to their functioning. Individual experiences with symptoms and treatment options were discussed.     Patient Session Goals / Objectives:  Discussed definitions of trauma, Trauma-Informed Care, PTSD  Discussed how traumatic experiences affect the individual and their relationships (directly, indirectly, brain development and function)  Identified how a history of trauma or exposure to trauma may impact group work  Assisted patients to find ways to adapt functioning in light of past traumatic experience(s), and to identify suitable treatment options and community resources      Patient Participation / Response:  Fully participated with the group by sharing personal reflections / insights and openly received / provided feedback with other participants.    Demonstrated understanding of topics discussed through group discussion and participation and Identified / Expressed readiness to act on skill suggestions discussed in topic    Treatment Plan:  Patient has a current master individualized treatment plan.  See Epic treatment  plan for more information.    Preethi Stone, LICSW

## 2025-03-11 NOTE — GROUP NOTE
Psychotherapy Group Note    PATIENT'S NAME: Preethi Mays  MRN:   [id number]  :   1960  ACCT. NUMBER: [id number]  DATE OF SERVICE: 3/07/25  START TIME: 12:00 PM  END TIME: 12:50 PM  FACILITATOR: Kaitlyn Gu LICSW  TOPIC:  EBP Group: Cognitive Restructuring  [facility] Mental [facility] Outpatient Programs  TRACK: 8    NUMBER OF PARTICIPANTS: 8    Summary of Group / Topics Discussed:  Cognitive Restructuring: Introduction and Overview: Patients were introduced to Cognitive Behavioral Therapy (CBT) as a way to identify ineffective thought patterns, understand factors that contribute to ineffective thought patterns, gain awareness of the impact of those thoughts on emotions and behavior, and learn methods for modifying thinking to achieve better mood regulation. Patients received a general overview of how ones thoughts influence feelings and behaviors in the context of CBT. Patients explored the development of their own thought patterns and how they impact daily functioning.      Patient Session Goals / Objectives:  Familiarize self with the interrelationship of thoughts, feelings and behavior.  Identify ineffective / unhelpful thought patterns and their impact on mood.             Patient Participation / Response:  Fully participated with the group by sharing personal reflections / insights and openly received / provided feedback with other participants.    Demonstrated understanding of topics discussed through group discussion and participation, Expressed understanding of the relationship between behaviors, thoughts, and feelings, and Demonstrated knowledge of personal thought patterns and how they impact their mood and behavior.    Treatment Plan:  Patient has a current master individualized treatment plan.  See Epic treatment plan for more information.    KVNG Craven

## 2025-03-12 ENCOUNTER — HOSPITAL ENCOUNTER (OUTPATIENT)
Dept: BEHAVIORAL HEALTH | Facility: CLINIC | Age: 65
End: 2025-03-12
Attending: PSYCHIATRY & NEUROLOGY
Payer: COMMERCIAL

## 2025-03-12 ENCOUNTER — VIRTUAL VISIT (OUTPATIENT)
Dept: PSYCHIATRY | Facility: CLINIC | Age: 65
End: 2025-03-12
Attending: NURSE PRACTITIONER
Payer: COMMERCIAL

## 2025-03-12 DIAGNOSIS — F43.10 PTSD (POST-TRAUMATIC STRESS DISORDER): ICD-10-CM

## 2025-03-12 PROCEDURE — 90853 GROUP PSYCHOTHERAPY: CPT | Performed by: SOCIAL WORKER

## 2025-03-12 PROCEDURE — 90853 GROUP PSYCHOTHERAPY: CPT

## 2025-03-12 ASSESSMENT — PAIN SCALES - GENERAL: PAINLEVEL_OUTOF10: NO PAIN (0)

## 2025-03-12 NOTE — GROUP NOTE
Psychoeducation Group Note    PATIENT'S NAME: Preethi Mays  MRN:   [id number]  :   1960  ACCT. NUMBER: [id number]  DATE OF SERVICE: 3/12/25  START TIME:  9:00 AM  END TIME:  9:50 AM  FACILITATOR: Kaitlyn Gu LICSW  TOPIC:  Wellness Group: [facility] Adult Mental Health Outpatient Programs  TRACK: IOP DT 4     NUMBER OF PARTICIPANTS: 8    Summary of Group / Topics Discussed:  Foundations of Health: Sleep: Case study/sleep hygiene: Patients explored the connection between sleep and mental illness. Patients learned about how adequate sleep can improve health, productivity, wellness, quality of life, and safety.     Patient Session Goals / Objectives:  Demonstrated understanding of sleep hygiene practices and benefits of sleep  Identified sleep hygiene strategies to utilize   Described the connection between sleep disturbances and mental illness      Patient Participation / Response:  Fully participated with the group by sharing personal reflections / insights and openly received / provided feedback with other participants.    Demonstrated understanding of topics discussed through group discussion and participation, Identified / Expressed personal readiness to practice skills, and Verbalized understanding of foundations of health topic    Treatment Plan:  Patient has a current master individualized treatment plan.  See Epic treatment plan for more information.    KVNG Craven

## 2025-03-12 NOTE — GROUP NOTE
Psychotherapy Group Note    PATIENT'S NAME: Preethi Mays  MRN:   [id number]  :   1960  ACCT. NUMBER: [id number]  DATE OF SERVICE: 3/12/25  START TIME: 12:00 PM  END TIME: 12:50 PM  FACILITATOR: Preethi Stone LICSW  TOPIC: MH EBP Group: Specialty Awareness  [facility] Adult Mental Health Outpatient Programs  TRACK: iop/dt4    NUMBER OF PARTICIPANTS: 8    Summary of Group / Topics Discussed:  Window of tolerance  Specialty Topics: Trauma and PTSD: Patients were provided with information to understand the types and origins of trauma, the relationship between trauma and PTSD, the scope of Trauma-Informed Care, and treatment options. Patients were able to explore how trauma may have impacted their functioning directly or indirectly, with reference to the the complexity of trauma and associated treatment options. Patients reviewed their current awareness of this topic and relevance to their functioning. Individual experiences with symptoms and treatment options were discussed.     Patient Session Goals / Objectives:  Discussed definitions of trauma, Trauma-Informed Care, PTSD  Discussed how traumatic experiences affect the individual and their relationships (directly, indirectly, brain development and function)  Identified how a history of trauma or exposure to trauma may impact group work  Assisted patients to find ways to adapt functioning in light of past traumatic experience(s), and to identify suitable treatment options and community resources  Clients were encouraged to identify their individual signs of window tolerance, hyperarousal and hypoarousal      Patient Participation / Response:  Fully participated with the group by sharing personal reflections / insights and openly received / provided feedback with other participants.    Demonstrated understanding of topics discussed through group discussion and participation and Identified / Expressed readiness to act on skill suggestions  discussed in topic    Treatment Plan:  Patient has a current master individualized treatment plan.  See Epic treatment plan for more information.    KVNG Shields

## 2025-03-12 NOTE — GROUP NOTE
"Psychotherapy Group Note    PATIENT'S NAME: Preethi Mays  MRN:   [id number]  :   1960  ACCT. NUMBER: [id number]  DATE OF SERVICE: 3/12/25  START TIME: 11:00 AM  END TIME: 11:50 AM  FACILITATOR: Preethi Stone LICSW  TOPIC: MH EBP Group: Cognitive Restructuring  [facility] Mental [facility] Outpatient Programs  TRACK: iop/dt4    NUMBER OF PARTICIPANTS: 8    Summary of Group / Topics Discussed: In narrative therapy framework, explored the stories we tell ourselves  Cognitive Restructuring: Core Beliefs: Patients received an overview of what a core belief is, and how they develop. Patients then began to identify their negative core beliefs. Patients worked to modify core beliefs with the goal of improved self-image and functioning.     Patient Session Goals / Objectives:  Familiarize self with the concept of core beliefs and how they develop.    Explore personal core beliefs (positive and negative)  Develop / advance recognition of the connection between negative thoughts and negative core beliefs.  Formulate new neutral/positive core beliefs  Can begin to understand  \"stories\" that are created about oneself in midst of trauma and how these stories impact current experiences             Patient Participation / Response:  Fully participated with the group by sharing personal reflections / insights and openly received / provided feedback with other participants.    Demonstrated understanding of topics discussed through group discussion and participation and Expressed understanding of the relationship between behaviors, thoughts, and feelings    Treatment Plan:  Patient has a current master individualized treatment plan.  See Epic treatment plan for more information.    KVNG Shields   "

## 2025-03-12 NOTE — GROUP NOTE
"Process Group Note    PATIENT'S NAME: Preethi Mays  MRN:   [id number]  :   1960  ACCT. NUMBER: [id number]  DATE OF SERVICE: 3/12/25  START TIME: 10:00 AM  END TIME: 10:50 AM  FACILITATOR: Preethi Stone LICSW  TOPIC:  Process Group    Diagnoses:  296.32 (F33.1) Major Depressive Disorder, Recurrent Episode, Moderate _ and With anxious distress.  3. Other Diagnoses that is relevant to services:   300.00 (F41.9) Unspecified Anxiety Disorder  309.81 (F43.10) Posttraumatic Stress Disorder (includes Posttraumatic Stress Disorder for Children 6 Years and Younger)  With dissociative symptoms.    [facility]patient [facility]  TRACK: iop/dt4    NUMBER OF PARTICIPANTS: 8        Data:    Session content: At the start of this group, patients were invited to check in by identifying themselves, describing their current emotional status, and identifying issues to address in this group.   Area(s) of treatment focus addressed in this session included Symptom Management and Personal Safety.  Whit describes feeling \"pretty good\".  Working toward goals.  Identifies barriers as her self talk.  Proud of lifting weights because she wanted to and not as behavioral activation Engaged in therapy process    Therapeutic Interventions/Treatment Strategies:  Psychotherapist offered support, feedback and validation and reinforced use of skills. Treatment modalities used include Cognitive Behavioral Therapy and Dialectical Behavioral Therapy.    Assessment:    Patient response:   Patient responded to session by accepting feedback, giving feedback, listening, focusing on goals, being attentive, accepting support, appearing alert, demonstrating behavior change, and verbalizing understanding    Possible barriers to participation / learning include: and no barriers identified    Health Issues:   None reported       Substance Use Review:   Substance Use: No active concerns identified.    Mental " Status/Behavioral Observations  Appearance:   Appropriate   Eye Contact:   Good   Psychomotor Behavior: Normal   Attitude:   Cooperative   Orientation:   All  Speech   Rate / Production: Normal    Volume:  Normal   Mood:    Anxious   Affect:    Appropriate   Thought Content:   Clear  Thought Form:  Coherent  Goal Directed  Logical     Insight:    Good     Plan:   Safety Plan: No current safety concerns identified.  Recommended that patient call 911 or go to the local ED should there be a change in any of these risk factors.   Barriers to treatment: None identified  Patient Contracts (see media tab):  None  Substance Use: Not addressed in session   Continue or Discharge: Patient will continue in Adult Day Treatment (ADT)  as planned. Patient is likely to benefit from learning and using skills as they work toward the goals identified in their treatment plan.      Preethi Stone, [facility]  March 12, 2025
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
## 2025-03-12 NOTE — PROGRESS NOTES
Is the patient currently in the state of [place]? YES    Visit mode: VIDEO    If the visit is dropped, the patient can be reconnected by:VIDEO VISIT: Send to e-mail at: [email or web address]    Will anyone else be joining the visit? NO  (If patient encounters technical issues they should call [phone]    Are changes needed to the allergy or medication list? No    Are refills needed on medications prescribed by this physician? NO    Rooming Documentation:  Questionnaire(s) completed    Reason for visit: Video Visit (Follow Up )    Gisell RUTH

## 2025-03-13 ENCOUNTER — HOSPITAL ENCOUNTER (OUTPATIENT)
Dept: BEHAVIORAL HEALTH | Facility: CLINIC | Age: 65
End: 2025-03-13
Attending: PSYCHIATRY & NEUROLOGY
Payer: COMMERCIAL

## 2025-03-13 PROCEDURE — 90853 GROUP PSYCHOTHERAPY: CPT

## 2025-03-13 PROCEDURE — 90853 GROUP PSYCHOTHERAPY: CPT | Performed by: SOCIAL WORKER

## 2025-03-13 PROCEDURE — 90853 GROUP PSYCHOTHERAPY: CPT | Performed by: COUNSELOR

## 2025-03-13 NOTE — GROUP NOTE
"Psychoeducation Group Note    PATIENT'S NAME: Preethi Mays  MRN:   [id number]  :   1960  ACCT. NUMBER: [id number]  DATE OF SERVICE: 3/13/25  START TIME:  9:00 AM  END TIME:  9:50 AM  FACILITATOR: Indra Mercer LPC; Cate Levy OTR  TOPIC:  Life Skills Group: Resiliency Development  [facility] Mental Health Outpatient Programs  TRACK: IOP/DT-4 TRAUMA  NUMBER OF PARTICIPANTS: 6    Resiliency Development: Occupational Gifts:   Patients were given the opportunity to reflect on and identify different types of occupations (activities) they participate in to maintain and/or build resilience in their lives.  Patients were taught about how \"doing\" promotes mental health recovery by providing routine and structure, empowerment, sense of self, and connectedness. Patients identified occupations (activities) that promote mental health: connection, centering, creation, contemplation, and contribution. Patients were also given the opportunity to improve self-efficacy, self-sufficiency, quality of life and a sense of mastery and competency by identifying and exploring positive activities they participate in their life. Validation, support, and feedback were provided from staff throughout group.     Patient Session Goals and Objectives:   Identified occupations (activities) they can use to promote mental health recovery and to effectively manage mental health symptoms.  Improved awareness of positive qualities of occupations they currently participate in and new occupations they might try and how this contributes to the process of recovery and mental health wellbeing and resiliency.  Established a plan for practice of these skills in their own environments.   Practiced and reflected on how to generalize taught skills to their everyday life.    Patient Participation / Response:  Fully participated with the group by sharing personal reflections / insights and openly received / provided feedback " with other participants.    Verbalized understanding of content, Patient would benefit from additional opportunities to practice the content to be able to generalize it to their everyday life with increased intentionality, consistency, and efficacy in support of their psychiatric recovery, and Patient worked towards initial treatment plan goals     Treatment Plan:  Patient has a current master individualized treatment plan.  See Epic treatment plan for more information.    Indra Mercer, LPC

## 2025-03-13 NOTE — GROUP NOTE
Psychotherapy Group Note    PATIENT'S NAME: Preethi Mays  MRN:   [id number]  :   1960  ACCT. NUMBER: [id number]  DATE OF SERVICE: 3/13/25  START TIME: 12:00 PM  END TIME: 12:50 PM  FACILITATOR: Janie Saucedo LGSW  TOPIC: MH EBP Group: Relationship Skills  [facility]  TRACK: IOP/DT 1    NUMBER OF PARTICIPANTS: 6    Summary of Group / Topics Discussed:  Relationship Skills: Basic Communication: Patients were provided with a general overview of interpersonal communication skills and information about how communication skills impact symptoms and functioning. The goal of this topic is to help patients identify communication issues and gain skills to work towards healthier interpersonal communication. Patients reviewed their current awareness of communication issues and how communication skills impact relationships and functioning.      Patient Session Goals / Objectives:  Identified and discussed patients individual challenges with basic communication  Presented and practiced effective communication skills in session  Assisted patients in implementing more effective communication skills in their relationships      Patient Participation / Response:  Fully participated with the group by sharing personal reflections / insights and openly received / provided feedback with other participants.    Verbalized understanding of communication skills, communication challenges, and communication strengths    Treatment Plan:  Patient has a current master individualized treatment plan.  See Epic treatment plan for more information.    YOUSIF Ortiz    See blurred vision plan

## 2025-03-13 NOTE — GROUP NOTE
Psychotherapy Group Note    PATIENT'S NAME: Preethi Mays  MRN:   [id number]  :   1960  ACCT. NUMBER: [id number]  DATE OF SERVICE: 3/13/25  START TIME: 11:00 AM  END TIME: 11:50 AM  FACILITATOR: Eugene Vallejo LMFT  TOPIC: MH EBP Group: Relationship Skills  [facility] Outpatient Programs  TRACK: IOPDT4    NUMBER OF PARTICIPANTS: 6    Summary of Group / Topics Discussed:  Relationship Skills: Relationship Mapping: Patients identified different types of relationships they have in their life and examined if there is conflict or closeness, the degree of conflict or closeness, and the reason for conflict. The goal of this topic is to help patients gain awareness of the relationships they have with others, identify types of conflict in patients  lives and how they impact symptoms/functioning, and identify how they can improve relationships with relationship and interpersonal skills they have learned.     Patient Session Goals / Objectives:  Familiarized patients with awareness to the different types of relationships they may have with different people and substances in their lives  Discussed and practiced strategies to promote healthier understanding of interpersonal relationships with a focus on awareness of conflict, the causes of conflict, and the ways to transform conflict  Discussed the use of substances and its impact on their relationships      Patient Participation / Response:  Fully participated with the group by sharing personal reflections / insights and openly received / provided feedback with other participants.    Demonstrated understanding of topics discussed through group discussion and participation, Demonstrated understanding of relationship skills and communication skills, Identified / Expressed personal readiness to incorporate effective communication skills, and Practiced skills in session    Treatment Plan:  Patient has a current master individualized  treatment plan.  See Epic treatment plan for more information.    Eugene Vallejo LMFT

## 2025-03-13 NOTE — GROUP NOTE
"Process Group Note    PATIENT'S NAME: Preethi Mays  MRN:   [id number]  :   1960  ACCT. NUMBER: [id number]  DATE OF SERVICE: 3/13/25  START TIME: 10:00 AM  END TIME: 10:50 AM  FACILITATOR: Tiffany Tate [facility]  TOPIC:  Process Group    Diagnoses:  296.32 (F33.1) Major Depressive Disorder, Recurrent Episode, Moderate _ and With anxious distress.  3. Other Diagnoses that is relevant to services:   300.00 (F41.9) Unspecified Anxiety Disorder  309.81 (F43.10) Posttraumatic Stress Disorder (includes Posttraumatic Stress Disorder for Children 6 Years and Younger)  With dissociative symptoms.    [facility] Adult Mental Health Outpatient Programs  TRACK: IOP/DT 4    NUMBER OF PARTICIPANTS: 6        Data:    Session content: At the start of this group, patients were invited to check in by identifying themselves, describing their current emotional status, and identifying issues to address in this group.     Area(s) of treatment focus addressed in this session included Symptom Management, Personal Safety, Develop / Improve Independent Living Skills, Develop Socialization / Interpersonal Relationship Skills, and Physical Health .    Whit describes feeling \"pretty good\" and grounded. Denies S/I, SIB or safety issues. Whit is working toward goals. She is using coping skills for symptom management including radical acceptance and countering negative self talk. She reports less pain today in her leg. She is looking forward to making a maternity dress for her adult daughter who is pregnant. She is grateful for hearing the birds with the warmer weather.            3/13/2025    11:00 AM   Suicide Ideation Check In   Since last session, how often have you had suicidal thoughts? No thoughts of suicide, but wish I were dead       Therapeutic Interventions/Treatment Strategies:  Psychotherapist offered support, feedback and validation and reinforced use of skills. Treatment modalities used include Cognitive " Behavioral Therapy.    Assessment:    Patient response:   Patient responded to session by listening, focusing on goals, being attentive, and verbalizing understanding    Possible barriers to participation / learning include: and no barriers identified    Health Issues:   Yes: Less leg pain today.  Reports medication compliance.        Substance Use Review:   Substance Use: No active concerns identified.    Mental Status/Behavioral Observations  Appearance:   Appropriate   Eye Contact:   Good   Psychomotor Behavior: Normal   Attitude:   Cooperative  Interested  Orientation:   All  Speech   Rate / Production: Normal    Volume:  Normal   Mood:    Increase mood stabilization. Less depressed and anxious mood  Affect:    Appropriate   Thought Content:   Clear and Safety denies any current safety concerns including suicidal ideation, self-harm, and homicidal ideation  Thought Form:  Coherent  Goal Directed  Logical     Insight:    Good     Plan:   Safety Plan: No current safety concerns identified.  Recommended that patient call 911 or go to the local ED should there be a change in any of these risk factors.   Barriers to treatment: None identified  Patient Contracts (see media tab):  None  Substance Use: Not addressed in session   Continue or Discharge: Patient will continue in Adult Day Treatment (ADT)  as planned. Patient is likely to benefit from learning and using skills as they work toward the goals identified in their treatment plan.      Tiffany Tate, [facility]  March 13, 2025

## 2025-03-18 ENCOUNTER — HOSPITAL ENCOUNTER (OUTPATIENT)
Dept: BEHAVIORAL HEALTH | Facility: CLINIC | Age: 65
Discharge: HOME | End: 2025-03-18
Attending: PSYCHIATRY & NEUROLOGY
Payer: COMMERCIAL

## 2025-03-18 ENCOUNTER — HOSPITAL ENCOUNTER (OUTPATIENT)
Dept: BEHAVIORAL HEALTH | Facility: CLINIC | Age: 65
End: 2025-03-18
Attending: PSYCHIATRY & NEUROLOGY
Payer: COMMERCIAL

## 2025-03-18 DIAGNOSIS — F33.1 MODERATE EPISODE OF RECURRENT MAJOR DEPRESSIVE DISORDER (H): Primary | ICD-10-CM

## 2025-03-18 DIAGNOSIS — F43.10 POSTTRAUMATIC STRESS DISORDER: ICD-10-CM

## 2025-03-18 PROCEDURE — 90853 GROUP PSYCHOTHERAPY: CPT | Performed by: SOCIAL WORKER

## 2025-03-18 PROCEDURE — 99214 OFFICE O/P EST MOD 30 MIN: CPT

## 2025-03-18 PROCEDURE — 90853 GROUP PSYCHOTHERAPY: CPT

## 2025-03-18 NOTE — GROUP NOTE
Psychoeducation Group Note    PATIENT'S NAME: Preethi Mays  MRN:   [id number]  :   1960  ACCT. NUMBER: [id number]  DATE OF SERVICE: 3/18/25  START TIME:  9:00 AM  END TIME:  9:50 AM  FACILITATOR: Indra Mercer LPC; Cate Levy OTR  TOPIC: MH Life Skills Group: Resiliency Development  [facility] Adult Mental Health Outpatient Programs  TRACK: IOP/DT-4 TRAUMA  NUMBER OF PARTICIPANTS: 6    Summary of Group and Topics Discussed:  Resiliency Development: Occupational Gifts Experiential: Patients were given the opportunity to engage in an activity that promotes resilience, connection, and empowerment throughout their daily life. Patients independently identified an activity that will contribute to their mental health recovery and promote self-efficacy and a sense of mastery. Patients discussed with peers the impact of the activity on their stress and anxiety levels. Validation, support, and guidance were provided throughout group.    Patient Session Goals and Objectives:  Established a plan for practice of these skills in their own environments.   Practiced and reflected on how to generalize taught skills to their everyday life.   Independently engage in an occupation that promotes mental health recovery.    Patient Participation and Response:  Fully participated with the group by sharing personal reflections / insights and openly received / provided feedback with other participants. Verbalized understanding of content, Patient would benefit from additional opportunities to practice the content to be able to generalize it to their everyday life with increased intentionality, consistency, and efficacy in support of their psychiatric recovery, and Patient worked towards initial treatment plan goals     Treatment Plan:  Patient has a current master individualized treatment plan.  See Epic treatment plan for more information.    Indra Mercer LPC

## 2025-03-18 NOTE — GROUP NOTE
Psychotherapy Group Note    PATIENT'S NAME: Preethi Mays  MRN:   [id number]  :   1960  ACCT. NUMBER: [id number]  DATE OF SERVICE: 3/18/25  START TIME: 11:00 AM  END TIME: 11:50 AM  FACILITATOR: Preethi Stone LICSW  TOPIC: MH EBP Group: Self-Awareness  [facility] Mental Health Outpatient Programs  TRACK: iop/dt4    NUMBER OF PARTICIPANTS: 5    Summary of Group / Topics Discussed:  Self-Awareness: Gratitude: Topic focused on assisting patients in identifying key concepts in gratitude. Patients discussed the benefits of practicing gratitude and its impact on mood improvement, mindfulness, and perspective. Patients worked to increase time spent on recognition and appreciation of what is positive and working in their lives. Patients discussed the concepts and benefits of feeling grateful. The goal is to reduce rumination and negative thinking resulting in increased mindfulness and resilience. Patients specifically discussed how they can practice and problem solve barriers to daily gratitude practice.     Patient Session Goals / Objectives:  [place] the concept and benefits of gratitude  Identified ways to practice gratitude in daily life  Problem solved barriers to practicing gratitude      Patient Participation / Response:  Fully participated with the group by sharing personal reflections / insights and openly received / provided feedback with other participants.    Demonstrated understanding of topics discussed through group discussion and participation and Demonstrated understanding of values, strengths, and challenges to learn about themselves    Treatment Plan:  Patient has a current master individualized treatment plan.  See Epic treatment plan for more information.    KVNG Shields

## 2025-03-18 NOTE — GROUP NOTE
Psychotherapy Group Note    PATIENT'S NAME: Preethi Mays  MRN:   [id number]  :   1960  ACCT. NUMBER: [id number]  DATE OF SERVICE: 3/18/25  START TIME: 12:00 PM  END TIME: 12:50 PM  FACILITATOR: Preethi Stone LICSW  TOPIC:  EBP Group: Self-Awareness  [facility] Mental Health Outpatient Programs  TRACK: iop/dt4    NUMBER OF PARTICIPANTS: 6    Summary of Group / Topics Discussed:  Self-Awareness: Values: Patients identified personal values by examining development of their current values and how their values influence their daily functioning and life choices. Patients explored the impact of their values on their thoughts, feelings, and actions. Patients discussed definition of personal values and how they develop and change over time. The goal is to help patients reconcile value conflicts and achieve balance and flexibility to improve mood and daily functioning.     Patient Session Goals / Objectives:  Examined development of values and impact of values on functioning  Identified and prioritized important values related to current well-being   Identified strategies to change or enhance values to positively impact symptoms  Assisted patients to find ways to adapt functioning to better fit their values      Patient Participation / Response:  Fully participated with the group by sharing personal reflections / insights and openly received / provided feedback with other participants.    Demonstrated understanding of topics discussed through group discussion and participation, Demonstrated understanding of values, strengths, and challenges to learn about themselves, and Identified / Expressed readiness to act intentionally, increase self-compassion, promote personal growth    Treatment Plan:  Patient has a current master individualized treatment plan.  See Epic treatment plan for more information.    KVNG Shields

## 2025-03-18 NOTE — GROUP NOTE
"Process Group Note    PATIENT'S NAME: Preethi Mays  MRN:   [id number]  :   1960  ACCT. NUMBER: [id number]  DATE OF SERVICE: 3/18/25  START TIME: 10:00 AM  END TIME: 10:50 AM  FACILITATOR: Preethi Stone LICSW  TOPIC:  Process Group    Diagnoses:  296.32 (F33.1) Major Depressive Disorder, Recurrent Episode, Moderate _ and With anxious distress.  3. Other Diagnoses that is relevant to services:   300.00 (F41.9) Unspecified Anxiety Disorder  309.81 (F43.10) Posttraumatic Stress Disorder (includes Posttraumatic Stress Disorder for Children 6 Years and Younger)  With dissociative symptoms.    [facility] Adult Mental Health Outpatient Programs  TRACK: iop/dt4    NUMBER OF PARTICIPANTS: 6        Data:    Session content: At the start of this group, patients were invited to check in by identifying themselves, describing their current emotional status, and identifying issues to address in this group.   Area(s) of treatment focus addressed in this session included Symptom Management and Personal Safety.  Whit reports feeling \"competent\", states always working on goals.  She states she is \"aspiring\" to use more affirmations.  Takes time in group to share her decisions around disclosure to friends and family, particularly around SI.      Therapeutic Interventions/Treatment Strategies:  Psychotherapist offered support, feedback and validation and reinforced use of skills. Treatment modalities used include Cognitive Behavioral Therapy and Dialectical Behavioral Therapy. Interventions include Relationship Skills: Assisted patients in implementing more effective communication skills in their relationships and Discussed strategies to promote healthier understanding of interpersonal relationships.    Assessment:    Patient response:   Patient responded to session by accepting feedback, giving feedback, listening, focusing on goals, being attentive, accepting support, appearing alert, demonstrating behavior " change, and verbalizing understanding    Possible barriers to participation / learning include: and no barriers identified    Health Issues:   None reported       Substance Use Review:   Substance Use: No active concerns identified.    Mental Status/Behavioral Observations  Appearance:   Appropriate   Eye Contact:   Good   Psychomotor Behavior: Normal   Attitude:   Cooperative   Orientation:   All  Speech   Rate / Production: Normal    Volume:  Normal   Mood:    Anxious  Normal  Affect:    Appropriate   Thought Content:   Clear  Thought Form:  Coherent  Goal Directed  Logical     Insight:    Good     Plan:   Safety Plan: No current safety concerns identified.  Recommended that patient call 911 or go to the local ED should there be a change in any of these risk factors.   Barriers to treatment: None identified  Patient Contracts (see media tab):  None  Substance Use: Not addressed in session   Continue or Discharge: Patient will continue in Adult Day Treatment (ADT)  as planned. Patient is likely to benefit from learning and using skills as they work toward the goals identified in their treatment plan.      KVNG Shields  March 18, 2025
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
## 2025-03-19 ENCOUNTER — HOSPITAL ENCOUNTER (OUTPATIENT)
Dept: BEHAVIORAL HEALTH | Facility: CLINIC | Age: 65
End: 2025-03-19
Attending: PSYCHIATRY & NEUROLOGY
Payer: COMMERCIAL

## 2025-03-19 PROCEDURE — 90853 GROUP PSYCHOTHERAPY: CPT

## 2025-03-19 PROCEDURE — 90853 GROUP PSYCHOTHERAPY: CPT | Performed by: SOCIAL WORKER

## 2025-03-19 NOTE — GROUP NOTE
"Process Group Note    PATIENT'S NAME: Preethi Mays  MRN:   [id number]  :   1960  ACCT. NUMBER: [id number]  DATE OF SERVICE: 3/19/25  START TIME: 10:00 AM  END TIME: 10:50 AM  FACILITATOR: Preethi Stone LICSW  TOPIC:  Process Group    Diagnoses:  296.32 (F33.1) Major Depressive Disorder, Recurrent Episode, Moderate _ and With anxious distress.  3. Other Diagnoses that is relevant to services:   300.00 (F41.9) Unspecified Anxiety Disorder  309.81 (F43.10) Posttraumatic Stress Disorder (includes Posttraumatic Stress Disorder for Children 6 Years and Younger)  With dissociative symptoms.    [facility] Adult Mental Health Outpatient Programs  TRACK: iop/dt4    NUMBER OF PARTICIPANTS: 7        Data:    Session content: At the start of this group, patients were invited to check in by identifying themselves, describing their current emotional status, and identifying issues to address in this group.   Area(s) of treatment focus addressed in this session included Symptom Management and Personal Safety.  Whit initially checks in stating she \"doesn't know how I feel\".  She is wanting to work on affirmation and staying in her body.  She is proud of participating in a writing workshop.  Took time to continue to process disclosures with friends and family    Therapeutic Interventions/Treatment Strategies:  Psychotherapist offered support, feedback and validation and reinforced use of skills. Treatment modalities used include Cognitive Behavioral Therapy and Dialectical Behavioral Therapy. Interventions include Relationship Skills: Assisted patients in implementing more effective communication skills in their relationships and Discussed strategies to promote healthier understanding of interpersonal relationships.    Assessment:    Patient response:   Patient responded to session by accepting feedback, giving feedback, listening, focusing on goals, being attentive, accepting support, appearing alert, " demonstrating behavior change, and verbalizing understanding    Possible barriers to participation / learning include: and no barriers identified    Health Issues:   None reported       Substance Use Review:   Substance Use: No active concerns identified.    Mental Status/Behavioral Observations  Appearance:   Appropriate   Eye Contact:   Good   Psychomotor Behavior: Normal   Attitude:   Cooperative   Orientation:   All  Speech   Rate / Production: Normal    Volume:  Normal   Mood:    Anxious   Affect:    Appropriate   Thought Content:   Clear  Thought Form:  Coherent  Goal Directed  Logical     Insight:    Good     Plan:   Safety Plan: No current safety concerns identified.  Recommended that patient call 911 or go to the local ED should there be a change in any of these risk factors.   Barriers to treatment: None identified  Patient Contracts (see media tab):  None  Substance Use: Not addressed in session   Continue or Discharge: Patient will continue in Adult Day Treatment (ADT)  as planned. Patient is likely to benefit from learning and using skills as they work toward the goals identified in their treatment plan.      ARIANNE Shields  March 19, 2025

## 2025-03-19 NOTE — GROUP NOTE
Psychotherapy Group Note    PATIENT'S NAME: Preethi Mays  MRN:   [id number]  :   1960  ACCT. NUMBER: [id number]  DATE OF SERVICE: 3/19/25  START TIME: 12:00 PM  END TIME: 12:50 PM  FACILITATOR: rPeethi Stone LICSW  TOPIC: MH EBP Group: Behavioral Activation  [facility] Adult Mental Health Outpatient Programs  TRACK: iop/dt4    NUMBER OF PARTICIPANTS: 7    Summary of Group / Topics Discussed:  Behavioral Activation: [place]: {Patients identified situations that prompt unwanted and unhelpful emotions / thoughts / behaviors.   Patients discussed how to problem solve by proactively using coping skills in potentially difficult situations. Components included describing the situation, brainstorming coping skills, imagining how scenario can/will unfold, rehearsing the action plan, and practicing relaxation to follow.  Patients practiced using these skills to reduce symptom distress and increase effective coping  behaviors.      Patient Session Goals / Objectives:  Identify difficult situation(s), and gain proficiency with alternative behaviors / skills to problem solve.  Increase confidence using coping skills through group practice in session.  Receive and provide feedback regarding skill development.  Apply coping skills in daily life situations.      Patient Participation / Response:  Fully participated with the group by sharing personal reflections / insights and openly received / provided feedback with other participants.    Demonstrated understanding of topics discussed through group discussion and participation and Expressed understanding of the relationship between behaviors, thoughts, and feelings    Treatment Plan:  Patient has a current master individualized treatment plan.  See Epic treatment plan for more information.    KVNG Shields

## 2025-03-19 NOTE — GROUP NOTE
Psychoeducation Group Note    PATIENT'S NAME: Preethi Mays  MRN:   [id number]  :   1960  ACCT. NUMBER: [id number]  DATE OF SERVICE: 3/19/25  START TIME:  9:00 AM  END TIME:  9:50 AM  FACILITATOR: Kaitlyn Gu LICSW; Yocasta Gusman RN  TOPIC:  Wellness Group: Mind/Body Practice & Complementary  [facility] Mental Health Outpatient Programs  TRACK: [facility] DT 4    NUMBER OF PARTICIPANTS: 7    Summary of Group / Topics Discussed:  Mind/Body Practice & Complementary Therapies:  Progressive Muscle Relaxation: In addition to affecting our mood and behavior, psychological stress can cause a myriad of physical symptoms in our body. Patients were educated on these effects and guided to increased self-awareness of how stress affects their body. The purpose, benefits, history, and techniques of progressive muscle relaxation were discussed. In an instructor guided experiential, patients were guided to practice PMR to help reduce physical symptoms of psychological stress and achieve a more balanced feeling of well-being.    Patient Session Goals / Objectives:  Identified physiological symptoms of stress on the body  Listed & Explained the purpose and benefits to practicing PMR   Practiced progressive muscle relaxation experiential      Patient Participation / Response:  Fully participated with the group by sharing personal reflections / insights and openly received / provided feedback with other participants.    Demonstrated understanding of topics discussed through group discussion and participation, Identified / Expressed personal readiness to practice skills, and Verbalized understanding of Mind/Body Practice & Complementary Therapies topic    Treatment Plan:  Patient has a current master individualized treatment plan.  See Epic treatment plan for more information.    KVNG Craven

## 2025-03-19 NOTE — GROUP NOTE
Psychotherapy Group Note    PATIENT'S NAME: Preethi Mays  MRN:   [id number]  :   1960  ACCT. NUMBER: [id number]  DATE OF SERVICE: 3/19/25  START TIME: 11:00 AM  END TIME: 11:50 AM  FACILITATOR: Preethi Stone LICSW  TOPIC: MH EBP Group: Cognitive Restructuring  [facility] Mental Health Outpatient Programs  TRACK: iop/dt4    NUMBER OF PARTICIPANTS: 7    Summary of Group / Topics Discussed:  Cognitive Restructuring: Core Beliefs: Patients received an overview of what a core belief is, and how they develop. Patients then began to identify their negative core beliefs. Patients worked to modify core beliefs with the goal of improved self-image and functioning.     Patient Session Goals / Objectives:  Familiarize self with the concept of core beliefs and how they develop.    Explore personal core beliefs (positive and negative)  Develop / advance recognition of the connection between negative thoughts and negative core beliefs.  Formulate new neutral/positive core beliefs             Patient Participation / Response:  Fully participated with the group by sharing personal reflections / insights and openly received / provided feedback with other participants.    Demonstrated understanding of topics discussed through group discussion and participation and Expressed understanding of the relationship between behaviors, thoughts, and feelings    Treatment Plan:  Patient has a current master individualized treatment plan.  See Epic treatment plan for more information.    KVNG Shields

## 2025-03-20 ENCOUNTER — HOSPITAL ENCOUNTER (OUTPATIENT)
Dept: BEHAVIORAL HEALTH | Facility: CLINIC | Age: 65
End: 2025-03-20
Attending: PSYCHIATRY & NEUROLOGY
Payer: COMMERCIAL

## 2025-03-20 DIAGNOSIS — F33.1 MODERATE EPISODE OF RECURRENT MAJOR DEPRESSIVE DISORDER (H): Primary | ICD-10-CM

## 2025-03-20 PROCEDURE — 90853 GROUP PSYCHOTHERAPY: CPT | Performed by: PSYCHOLOGIST

## 2025-03-20 PROCEDURE — 90853 GROUP PSYCHOTHERAPY: CPT | Performed by: SOCIAL WORKER

## 2025-03-20 NOTE — GROUP NOTE
Psychotherapy Group Note    PATIENT'S NAME: Preethi Mays  MRN:   [id number]  :   1960  ACCT. NUMBER: [id number]  DATE OF SERVICE: 3/20/25  START TIME: 11:00 AM  END TIME: 11:50 AM  FACILITATOR: Preethi Stone LICSW  TOPIC: MH EBP Group: Specialty Awareness  [facility] Adult Mental Health Outpatient Programs  TRACK: iop/dt4    NUMBER OF PARTICIPANTS: 6    Summary of Group / Topics Discussed:  Specialty Topics: Goal Setting: Provided education and assessment on patient's group programming goals. Evaluated patient's assessment of their ability to participate in groups, share emotions with group members, and openness to the group format. Provided education regarding appropriate individual-based group therapy goals.     Patient Session Goals and Objectives:  -Participate in reflective assessment of group readiness.  -Understand appropriate topics and methods to discuss those topics in group programming.  -Identify and problem solve barriers to group participation.  -Identify strategies to actively cope while engaging in group programming.   -Reflected up individualized treatment plans  -Meet with members of the treatment team to assess goal progress       Patient Participation / Response:  Fully participated with the group by sharing personal reflections / insights and openly received / provided feedback with other participants.    Demonstrated understanding of topics discussed through group discussion and participation and Identified / Expressed readiness to act on skill suggestions discussed in topic    Treatment Plan:  Patient has a current master individualized treatment plan.  See Epic treatment plan for more information.    KVNG Shields

## 2025-03-20 NOTE — GROUP NOTE
Psychotherapy Group Note    PATIENT'S NAME: Preethi Mays  MRN:   [id number]  :   1960  ACCT. NUMBER: [id number]  DATE OF SERVICE: 3/20/25  START TIME: 12:00 PM  END TIME: 12:50 PM  FACILITATOR: Preethi Stone LICSW  TOPIC:  EBP Group: [facility] Adult Mental Health Outpatient Programs  TRACK: iop/dt4    NUMBER OF PARTICIPANTS: 6    Summary of Group / Topics Discussed:  Mindfulness: Mindfulness Experiential: Patients received an overview on what mindfulness is and how mindfulness can benefit general health, mental health symptoms, and stressors. The history of mindfulness, its application to mental health therapies, and key concepts were also discussed. Patients discussed current awareness, knowledge, and practice of mindfulness skills. Patients also discussed barriers to mindfulness practice.  Clients were offered a mindful journaling prompt focusing on self growth    Patient Session Goals / Objectives:  Demonstrated and verbalized understanding of key mindfulness concepts  Identified when/how to use mindfulness skills  Resolved barriers to practicing mindfulness skills  Identified plan to use mindfulness skills in daily life       Patient Participation / Response:  Fully participated with the group by sharing personal reflections / insights and openly received / provided feedback with other participants.    Demonstrated understanding of topics discussed through group discussion and participation and Practiced skills in session    Treatment Plan:  Patient has a current master individualized treatment plan.  See Epic treatment plan for more information.    KVNG Shields

## 2025-03-20 NOTE — GROUP NOTE
Psychoeducation Group Note    PATIENT'S NAME: Preethi Mays  MRN:   [id number]  :   1960  ACCT. NUMBER: [id number]  DATE OF SERVICE: 3/20/25  START TIME:  9:00 AM  END TIME:  9:50 AM  FACILITATOR: Estefany Hernandez Psy.D, LP; Cate Levy OTR  TOPIC:  Life Skills Group: Lifestyle Balance and Structure  [facility] Adult Mental Health Outpatient Programs  TRACK: iop/dt4 trauma    NUMBER OF PARTICIPANTS: 8    Summary of Group / Topics Discussed:  Lifestyle Balance and Strucure:  Benefits of Leisure on Mental Health: Patients explored and learned about the benefits and possibilities of leisure activity to create lifestyle balance that supports their mental and physical wellbeing.  Patients were assisted to identify individualized leisure values and interests, recognized the benefits of leisure activity on mental health, and problem solved barriers to leisure engagement and strategies to overcome.  Patient engaged in an experiential leisure activity to gain self-awareness and build milieu social aspects and reflected on the impact the experiential activity had on their mood.       Patient Session Goals / Objectives:  Increased awareness of the importance of engagement in leisure activities to support lifestyle balance and perceived quality of life  Identified strategies to recognize and challenge barriers to leisure participation   Facilitated exploration of meaningful leisure interests and values  Practiced and reflected on how to generalize taught skills to their everyday life      Patient Participation / Response:  Fully participated with the group by sharing personal reflections / insights and openly received / provided feedback with other participants.    Verbalized understanding of content    Treatment Plan:  Patient has a current master individualized treatment plan.  See Epic treatment plan for more information.    Estefany Hernandez Psy.D, PENELOPE

## 2025-03-20 NOTE — GROUP NOTE
"Process Group Note    PATIENT'S NAME: Preethi Mays  MRN:   [id number]  :   1960  ACCT. NUMBER: [id number]  DATE OF SERVICE: 3/20/25  START TIME: 10:00 AM  END TIME: 10:50 AM  FACILITATOR: Preethi Stone LICSW  TOPIC:  Process Group    Diagnoses:  296.32 (F33.1) Major Depressive Disorder, Recurrent Episode, Moderate _ and With anxious distress.  3. Other Diagnoses that is relevant to services:   300.00 (F41.9) Unspecified Anxiety Disorder  309.81 (F43.10) Posttraumatic Stress Disorder (includes Posttraumatic Stress Disorder for Children 6 Years and Younger)  With dissociative symptoms.    [facility] Adult Mental Health Outpatient Programs  TRACK: iop/dt4    NUMBER OF PARTICIPANTS: 7        Data:    Session content: At the start of this group, patients were invited to check in by identifying themselves, describing their current emotional status, and identifying issues to address in this group.   Area(s) of treatment focus addressed in this session included Symptom Management and Personal Safety.  Whit describes feeling sad today.  Focus on goals of building \"affirmation bank\".  Shares that today is  sister's birthday.  Shares that mother  within 7 days of daughter's death in .  Shares that father has called and she will return call later this afternoon    Therapeutic Interventions/Treatment Strategies:  Psychotherapist offered support, feedback and validation and reinforced use of skills. Treatment modalities used include Cognitive Behavioral Therapy and Dialectical Behavioral Therapy. Interventions include Other: Assisted patient  in finding ways to adapt functioning in light of past traumatic experiences.    Assessment:    Patient response:   Patient responded to session by accepting feedback, giving feedback, listening, focusing on goals, being attentive, accepting support, appearing alert, demonstrating behavior change, and verbalizing understanding    Possible barriers to " participation / learning include: and no barriers identified    Health Issues:   None reported       Substance Use Review:   Substance Use: No active concerns identified.    Mental Status/Behavioral Observations  Appearance:   Appropriate   Eye Contact:   Good   Psychomotor Behavior: Normal   Attitude:   Cooperative   Orientation:   All  Speech   Rate / Production: Normal/ Responsive Normal    Volume:  Normal   Mood:    Sad  Grieving  Affect:    Appropriate   Thought Content:   Clear  Thought Form:  Coherent  Goal Directed  Logical     Insight:    Good     Plan:   Safety Plan: No current safety concerns identified.  Recommended that patient call 911 or go to the local ED should there be a change in any of these risk factors.   Barriers to treatment: None identified  Patient Contracts (see media tab):  None  Substance Use: Not addressed in session   Continue or Discharge: Patient will continue in Adult Day Treatment (ADT)  as planned. Patient is likely to benefit from learning and using skills as they work toward the goals identified in their treatment plan.      Preethi Stone [facility]  March 20, 2025

## 2025-03-25 ENCOUNTER — HOSPITAL ENCOUNTER (OUTPATIENT)
Dept: BEHAVIORAL HEALTH | Facility: CLINIC | Age: 65
End: 2025-03-25
Attending: PSYCHIATRY & NEUROLOGY
Payer: COMMERCIAL

## 2025-03-25 DIAGNOSIS — F33.1 MODERATE EPISODE OF RECURRENT MAJOR DEPRESSIVE DISORDER (H): Primary | ICD-10-CM

## 2025-03-25 PROCEDURE — 90853 GROUP PSYCHOTHERAPY: CPT

## 2025-03-25 PROCEDURE — 90853 GROUP PSYCHOTHERAPY: CPT | Performed by: SOCIAL WORKER

## 2025-03-25 NOTE — GROUP NOTE
Psychotherapy Group Note    PATIENT'S NAME: Preethi Mays  MRN:   [id number]  :   1960  ACCT. NUMBER: [id number]  DATE OF SERVICE: 3/25/25  START TIME: 11:00 AM  END TIME: 11:50 AM  FACILITATOR: Preethi Stone LICSW  TOPIC:  EBP Group: Emotions Management  [facility] Mental Health Outpatient Programs  TRACK: iop/dt4    NUMBER OF PARTICIPANTS: 7    Summary of Group / Topics Discussed:  Emotions Management: Understanding Emotions: Patients discussed the purpose of emotions and function they serve in our lives.  Reviewed core emotions, why they happen (triggers), and how they occur. The group assisted one anothers' understanding that: emotional experiences are important; difficult emotions have a place in our lives; and the differences between various emotions.    Patient Session Goals / Objectives:  Demonstrate understanding of types various emotions  Identify and discuss specific emotions and when they occur; understand triggers  Discuss barriers to emotional regulation      Patient Participation / Response:  Fully participated with the group by sharing personal reflections / insights and openly received / provided feedback with other participants.    Demonstrated understanding of topics discussed through group discussion and participation and Expressed understanding of the relevance / importance of emotions management skills at distressing times in life    Treatment Plan:  Patient has a current master individualized treatment plan.  See Epic treatment plan for more information.    KVNG Shields

## 2025-03-25 NOTE — GROUP NOTE
Psychotherapy Group Note    PATIENT'S NAME: Preethi Mays  MRN:   [id number]  :   1960  ACCT. NUMBER: [id number]  DATE OF SERVICE: 3/25/25  START TIME: 12:00 PM  END TIME: 12:50 PM  FACILITATOR: Preethi Stone LICSW  TOPIC: MH EBP Group: Emotions Management  [facility] Mental Health Outpatient Programs  TRACK: iop/dt4    NUMBER OF PARTICIPANTS: 7    Summary of Group / Topics Discussed:  Emotions Management: Opposite to Emotion: Patients discussed past and present struggles with knowing how to make changes in their lives due to difficult emotional experiences.  Explored desires to experience and feel less anger, sadness, guilt, and fear.  Reviewed the therapeutic skill of opposite action and patients explored opportunities to use their behaviors as a tool to reduce an emotion that they want to change.     Patient Session Goals / Objectives:  Review DBT concepts and focus on patient s experiences of distress and difficult emotional experiences.  Learn how to do the opposite of what an emotion makes us want to do in an effort to decrease an unwanted emotional experience.  Demonstrate understanding of the skill of opposite action by sharing experiences where the technique could be useful in past / present situations.      Patient Participation / Response:  Fully participated with the group by sharing personal reflections / insights and openly received / provided feedback with other participants.    Demonstrated understanding of topics discussed through group discussion and participation and Expressed understanding of the relevance / importance of emotions management skills at distressing times in life    Treatment Plan:  Patient has a current master individualized treatment plan.  See Epic treatment plan for more information.    KVNG Shields

## 2025-03-25 NOTE — GROUP NOTE
Process Group Note    PATIENT'S NAME: Preethi Mays  MRN:   [id number]  :   1960  ACCT. NUMBER: [id number]  DATE OF SERVICE: 3/25/25  START TIME: 10:00 AM  END TIME: 10:50 AM  FACILITATOR: Preethi Stone LICSW  TOPIC:  Process Group    Diagnoses:  296.32 (F33.1) Major Depressive Disorder, Recurrent Episode, Moderate _ and With anxious distress.  3. Other Diagnoses that is relevant to services:   300.00 (F41.9) Unspecified Anxiety Disorder  309.81 (F43.10) Posttraumatic Stress Disorder (includes Posttraumatic Stress Disorder for Children 6 Years and Younger)  With dissociative symptoms.    [facility] Adult Mental Health Outpatient Programs  TRACK: iop/dt4    NUMBER OF PARTICIPANTS: 7        Data:    Session content: At the start of this group, patients were invited to check in by identifying themselves, describing their current emotional status, and identifying issues to address in this group.   Area(s) of treatment focus addressed in this session included Symptom Management., personal safety.  Whit takes time in group to share that she has decided to cut off communication with father and two sisters as she feels it has not been healthy for herself to engage.  She states that she is tired today and is trying to use grounding techniques        Therapeutic Interventions/Treatment Strategies:  Psychotherapist offered support, feedback and validation and reinforced use of skills. Treatment modalities used include Cognitive Behavioral Therapy and Dialectical Behavioral Therapy. Interventions include Relationship Skills: Encouraged development and maintenance  of healthy boundaries.    Assessment:    Patient response:   Patient responded to session by accepting feedback, giving feedback, listening, focusing on goals, being attentive, accepting support, appearing alert, demonstrating behavior change, and verbalizing understanding    Possible barriers to participation / learning include: and no  barriers identified    Health Issues:   None reported       Substance Use Review:   Substance Use: No active concerns identified.    Mental Status/Behavioral Observations  Appearance:   Appropriate   Eye Contact:   Good   Psychomotor Behavior: Normal   Attitude:   Cooperative   Orientation:   All  Speech   Rate / Production: Normal    Volume:  Normal   Mood:    Anxious   Affect:    Appropriate   Thought Content:   Clear  Thought Form:  Coherent  Goal Directed  Logical     Insight:    Good     Plan:   Safety Plan: No current safety concerns identified.  Recommended that patient call 911 or go to the local ED should there be a change in any of these risk factors.   Barriers to treatment: None identified  Patient Contracts (see media tab):  None  Substance Use: Not addressed in session   Continue or Discharge: Patient will continue in Adult Day Treatment (ADT)  as planned. Patient is likely to benefit from learning and using skills as they work toward the goals identified in their treatment plan.      Preethi Stone, [facility]  March 25, 2025

## 2025-03-26 ENCOUNTER — HOSPITAL ENCOUNTER (OUTPATIENT)
Dept: BEHAVIORAL HEALTH | Facility: CLINIC | Age: 65
End: 2025-03-26
Attending: PSYCHIATRY & NEUROLOGY
Payer: COMMERCIAL

## 2025-03-26 PROCEDURE — 90853 GROUP PSYCHOTHERAPY: CPT | Performed by: SOCIAL WORKER

## 2025-03-26 PROCEDURE — 90853 GROUP PSYCHOTHERAPY: CPT

## 2025-03-26 NOTE — GROUP NOTE
Psychoeducation Group Note    PATIENT'S NAME: Preethi Mays  MRN:   [id number]  :   1960  ACCT. NUMBER: [id number]  DATE OF SERVICE: 3/25/25  START TIME:  9:00 AM  END TIME:  9:50 AM  FACILITATOR: Idnra Mercer LPC; Cate Levy OTR  TOPIC:  Life Skills Group: Life Skills  [facility] Adult Mental Health Outpatient Programs  TRACK: IOP/DT-4 TRAUMA  NUMBER OF PARTICIPANTS: 7    Summary of Group / Topics Discussed:  Life Skills Clinic: Provided opportunity for patients to independently choose and engage in a therapeutic activity that supports progress towards their goals and psychiatric recovery. Discussed the skill of behavioral activation and acting opposite to emotion to improve motivation in everyday life tasks. The Life Skills Clinic provides a context for patients to monitor their symptoms, gain self-awareness, practice skills (self-regulation, mindfulness, self-talk, focus/concentration, social, productivity), build a sense of self efficacy and mastery, as well as receive validation, support, and resources. Staff checks in, observes, assesses, and monitors performance skills and patterns in context with each group member.      Patient Session Goals / Objectives:   Independently identify a purposeful and meaningful therapeutic activity.   Identified which goal(s) they are intentionally working on during session.    Reflected on their performance and share insight about their progress.   Practiced and identified a way to generalize a skill to their everyday life.     Patient Participation / Response:  Fully participated with the group by sharing personal reflections / insights and openly received / provided feedback with other participants.    Verbalized understanding of content, Patient would benefit from additional opportunities to practice the content to be able to generalize it to their everyday life with increased intentionality, consistency, and efficacy in support of their  psychiatric recovery, and Patient worked towards initial treatment plan goals     Treatment Plan:  Patient has a current master individualized treatment plan.  See Epic treatment plan for more information.    Indra Mercer, LPC   SSKI Counseling:  I discussed with the patient the risks of SSKI including but not limited to thyroid abnormalities, metallic taste, GI upset, fever, headache, acne, arthralgias, paraesthesias, lymphadenopathy, easy bleeding, arrhythmias, and allergic reaction.

## 2025-03-26 NOTE — GROUP NOTE
Psychoeducation Group Note    PATIENT'S NAME: Preethi Mays  MRN:   [id number]  :   1960  ACCT. NUMBER: [id number]  DATE OF SERVICE: 3/26/25  START TIME:  9:00 AM  END TIME:  9:50 AM  FACILITATOR: Kaitlyn Gu LICSW  TOPIC: MH Wellness Group: [facility] Adult Mental Health Outpatient Programs  TRACK: IOP DT 4     NUMBER OF PARTICIPANTS: 7    Summary of Group / Topics Discussed:  [facility]:  [facility]: Polyvagal Theory through Song    Discuss how music can help bring humans into a ventral vagal state  Encourage each patient to choose a song that is important to them. Facilitator will play a minute of this song for the group.   Ask patient who shared the song to describe why they chose this song.  Offer group members the opportunity to give feedback        Patient Participation / Response:  Fully participated with the group by sharing personal reflections / insights and openly received / provided feedback with other participants.    Demonstrated understanding of topics discussed through group discussion and participation and Identified / Expressed personal readiness to practice skills    Treatment Plan:  Patient has a current master individualized treatment plan.  See Epic treatment plan for more information.    KVNG Craven

## 2025-03-26 NOTE — GROUP NOTE
Psychotherapy Group Note    PATIENT'S NAME: Preethi Mays  MRN:   [id number]  :   1960  ACCT. NUMBER: [id number]  DATE OF SERVICE: 3/26/25  START TIME: 12:00 PM  END TIME: 12:50 PM  FACILITATOR: Preethi Stone LICSW  TOPIC: MH EBP Group: Emotions Management  [facility]  TRACK: iop/dt4    NUMBER OF PARTICIPANTS: 6    Summary of Group / Topics Discussed:  Emotions Management: Opposite to Emotion:  Application Patients discussed past and present struggles with knowing how to make changes in their lives due to difficult emotional experiences.  Explored desires to experience and feel less anger, sadness, guilt, and fear.  Reviewed the therapeutic skill of opposite action and patients explored opportunities to use their behaviors as a tool to reduce an emotion that they want to change.     Patient Session Goals / Objectives:  Review DBT concepts and focus on patient s experiences of distress and difficult emotional experiences.  Learn how to do the opposite of what an emotion makes us want to do in an effort to decrease an unwanted emotional experience.  Demonstrate understanding of the skill of opposite action by sharing experiences where the technique could be useful in past / present situations.      Patient Participation / Response:  Fully participated with the group by sharing personal reflections / insights and openly received / provided feedback with other participants.    Demonstrated understanding of topics discussed through group discussion and participation and Expressed understanding of the relevance / importance of emotions management skills at distressing times in life    Treatment Plan:  Patient has a current master individualized treatment plan.  See Epic treatment plan for more information.    KVNG Shields

## 2025-03-26 NOTE — GROUP NOTE
Psychotherapy Group Note    PATIENT'S NAME: Preethi Mays  MRN:   [id number]  :   1960  ACCT. NUMBER: [id number]  DATE OF SERVICE: 3/26/25  START TIME: 11:00 AM  END TIME: 11:50 AM  FACILITATOR: Preethi Stone LICSW  TOPIC: MH EBP Group: Specialty Awareness  [facility]  TRACK: iop/dt4    NUMBER OF PARTICIPANTS: 7    Summary of Group / Topics Discussed:  Specialty Topics: Autobiography: This topic provides each patient with an opportunity to share his/her life story by including background information, significant events that have been life changing, and a means to talk about how mental health and trauma has impacted overall functioning and development.      Patient Session Goals / Objectives:  Patient  had opportunity his/her personal story and give some background on their past and/or listen to another patient share their personal story  Identified ways that mental illness and substance use has impacted functioning and development  Examined their lives with trauma      Patient Participation / Response:  Fully participated with the group by sharing personal reflections / insights and openly received / provided feedback with other participants.    Demonstrated understanding of topics discussed through group discussion and participation    Treatment Plan:  Patient has a current master individualized treatment plan.  See Epic treatment plan for more information.    KVNG Shields

## 2025-03-26 NOTE — GROUP NOTE
Process Group Note    PATIENT'S NAME: Preethi Mays  MRN:   [id number]  :   1960  ACCT. NUMBER: [id number]  DATE OF SERVICE: 3/26/25  START TIME: 10:00 AM  END TIME: 10:50 AM  FACILITATOR: Preethi Stone LICSW  TOPIC:  Process Group    Diagnoses:  296.32 (F33.1) Major Depressive Disorder, Recurrent Episode, Moderate _ and With anxious distress.  3. Other Diagnoses that is relevant to services:   300.00 (F41.9) Unspecified Anxiety Disorder  309.81 (F43.10) Posttraumatic Stress Disorder (includes Posttraumatic Stress Disorder for Children 6 Years and Younger)  With dissociative symptoms.    [facility]  TRACK: iop/dt4    NUMBER OF PARTICIPANTS: 7        Data:    Session content: At the start of this group, patients were invited to check in by identifying themselves, describing their current emotional status, and identifying issues to address in this group.   Area(s) of treatment focus addressed in this session included Symptom Management and Personal Safety.  Whit describes having more energy today.  Is focusing on breathing and on self awareness.  Continues to work toward goals and has worked on short story.  Engaged in therapy process    Therapeutic Interventions/Treatment Strategies:  Psychotherapist offered support, feedback and validation and reinforced use of skills. Treatment modalities used include Cognitive Behavioral Therapy and Dialectical Behavioral Therapy.    Assessment:    Patient response:   Patient responded to session by accepting feedback, giving feedback, listening, focusing on goals, being attentive, accepting support, appearing alert, demonstrating behavior change, and verbalizing understanding    Possible barriers to participation / learning include: and no barriers identified    Health Issues:   None reported       Substance Use Review:   Substance Use: No active concerns identified.    Mental Status/Behavioral  Observations  Appearance:   Appropriate   Eye Contact:   Good   Psychomotor Behavior: Normal   Attitude:   Cooperative   Orientation:   All  Speech   Rate / Production: Normal    Volume:  Normal   Mood:    Anxious  Normal  Affect:    Appropriate   Thought Content:   Clear  Thought Form:  Coherent  Goal Directed  Logical     Insight:    Good     Plan:   Safety Plan: No current safety concerns identified.  Recommended that patient call 911 or go to the local ED should there be a change in any of these risk factors.   Barriers to treatment: None identified  Patient Contracts (see media tab):  None  Substance Use: Not addressed in session   Continue or Discharge: Patient will continue in Adult Day Treatment (ADT)  as planned. Patient is likely to benefit from learning and using skills as they work toward the goals identified in their treatment plan.      Preethi Stone, [facility]  March 26, 2025

## 2025-03-27 ENCOUNTER — HOSPITAL ENCOUNTER (OUTPATIENT)
Dept: BEHAVIORAL HEALTH | Facility: CLINIC | Age: 65
End: 2025-03-27
Attending: PSYCHIATRY & NEUROLOGY
Payer: COMMERCIAL

## 2025-03-27 ENCOUNTER — VIRTUAL VISIT (OUTPATIENT)
Dept: PSYCHIATRY | Facility: CLINIC | Age: 65
End: 2025-03-27
Attending: NURSE PRACTITIONER
Payer: COMMERCIAL

## 2025-03-27 DIAGNOSIS — F33.1 MODERATE EPISODE OF RECURRENT MAJOR DEPRESSIVE DISORDER (H): Primary | ICD-10-CM

## 2025-03-27 DIAGNOSIS — F43.10 PTSD (POST-TRAUMATIC STRESS DISORDER): ICD-10-CM

## 2025-03-27 PROCEDURE — 90853 GROUP PSYCHOTHERAPY: CPT

## 2025-03-27 PROCEDURE — 90853 GROUP PSYCHOTHERAPY: CPT | Performed by: SOCIAL WORKER

## 2025-03-27 RX ORDER — BUSPIRONE HYDROCHLORIDE 30 MG/1
30 TABLET ORAL 2 TIMES DAILY
Qty: 60 TABLET | Refills: 1 | Status: SHIPPED | OUTPATIENT
Start: 2025-03-27

## 2025-03-27 ASSESSMENT — PATIENT HEALTH QUESTIONNAIRE - PHQ9
SUM OF ALL RESPONSES TO PHQ QUESTIONS 1-9: 5
SUM OF ALL RESPONSES TO PHQ QUESTIONS 1-9: 5
10. IF YOU CHECKED OFF ANY PROBLEMS, HOW DIFFICULT HAVE THESE PROBLEMS MADE IT FOR YOU TO DO YOUR WORK, TAKE CARE OF THINGS AT HOME, OR GET ALONG WITH OTHER PEOPLE: NOT DIFFICULT AT ALL
SUM OF ALL RESPONSES TO PHQ QUESTIONS 1-9: 5
10. IF YOU CHECKED OFF ANY PROBLEMS, HOW DIFFICULT HAVE THESE PROBLEMS MADE IT FOR YOU TO DO YOUR WORK, TAKE CARE OF THINGS AT HOME, OR GET ALONG WITH OTHER PEOPLE: NOT DIFFICULT AT ALL
SUM OF ALL RESPONSES TO PHQ QUESTIONS 1-9: 5

## 2025-03-27 NOTE — GROUP NOTE
"Process Group Note    PATIENT'S NAME: Preethi Mays  MRN:   [id number]  :   1960  ACCT. NUMBER: [id number]  DATE OF SERVICE: 3/27/25  START TIME: 10:00 AM  END TIME: 10:50 AM  FACILITATOR: Preethi Stone LICSW  TOPIC:  Process Group    Diagnoses:  296.32 (F33.1) Major Depressive Disorder, Recurrent Episode, Moderate _ and With anxious distress.  3. Other Diagnoses that is relevant to services:   300.00 (F41.9) Unspecified Anxiety Disorder  309.81 (F43.10) Posttraumatic Stress Disorder (includes Posttraumatic Stress Disorder for Children 6 Years and Younger)  With dissociative symptoms.    [facility]patient [facility]  TRACK: iop/dt4    NUMBER OF PARTICIPANTS: 5        Data:    Session content: At the start of this group, patients were invited to check in by identifying themselves, describing their current emotional status, and identifying issues to address in this group.   Area(s) of treatment focus addressed in this session included Symptom Management and Personal Safety.  Whit reports feeling more grounded today.  She states she is seeing group as a gift.  She states that she has grown in her ability to \"sit with skills\"   Engaged in group process    Therapeutic Interventions/Treatment Strategies:  Psychotherapist offered support, feedback and validation and reinforced use of skills. Treatment modalities used include Cognitive Behavioral Therapy and Dialectical Behavioral Therapy.    Assessment:    Patient response:   Patient responded to session by accepting feedback, giving feedback, listening, focusing on goals, being attentive, accepting support, appearing alert, demonstrating behavior change, and verbalizing understanding    Possible barriers to participation / learning include: and no barriers identified    Health Issues:   None reported       Substance Use Review:   Substance Use: No active concerns identified.    Mental Status/Behavioral " Observations  Appearance:   Appropriate   Eye Contact:   Good   Psychomotor Behavior: Normal   Attitude:   Cooperative   Orientation:   All  Speech   Rate / Production: Normal    Volume:  Normal   Mood:    Anxious  Normal  Affect:    Appropriate   Thought Content:   Clear  Thought Form:  Coherent  Goal Directed  Logical     Insight:    Good     Plan:   Safety Plan: No current safety concerns identified.  Recommended that patient call 911 or go to the local ED should there be a change in any of these risk factors.   Barriers to treatment: None identified  Patient Contracts (see media tab):  None  Substance Use: Not addressed in session   Continue or Discharge: Patient will continue in Adult Day Treatment (ADT)  as planned. Patient is likely to benefit from learning and using skills as they work toward the goals identified in their treatment plan.      Preethi Stone, [facility]  March 27, 2025

## 2025-03-27 NOTE — GROUP NOTE
Psychotherapy Group Note    PATIENT'S NAME: Preethi Mays  MRN:   [id number]  :   1960  ACCT. NUMBER: [id number]  DATE OF SERVICE: 3/27/25  START TIME: 12:00 PM  END TIME: 12:50 PM  FACILITATOR: Preethi Stone LICSW  TOPIC: MH EBP Group: Coping Skills  [facility]  TRACK: iop/dt4    NUMBER OF PARTICIPANTS: 5    Summary of Group / Topics Discussed:  Coping Skills: Additional Coping Skills:  Patients discussed and practiced developing coping plan.  Reviewed the benefits of applying the aforementioned coping strategies.  Patients explored how these strategies might be applied to daily stressors or distressing situations.    Patient Session Goals / Objectives:  Understand the purpose and benefits of applying a variety  coping strategies  Develop coping cards    Address barriers to utilizing coping skills when in distress.      Patient Participation / Response:  Fully participated with the group by sharing personal reflections / insights and openly received / provided feedback with other participants.    Demonstrated understanding of topics discussed through group discussion and participation and Expressed understanding of the relevance / importance of coping skills at distressing times in life    Treatment Plan:  Patient has a current master individualized treatment plan.  See Epic treatment plan for more information.    KVNG Shields

## 2025-03-27 NOTE — GROUP NOTE
"Process Group Note    PATIENT'S NAME: Preethi Mays  MRN:   [id number]  :   1960  ACCT. NUMBER: [id number]  DATE OF SERVICE: 3/27/25  START TIME: 12:00 PM  END TIME: 12:50 PM  FACILITATOR: Preethi Stone LICSW  TOPIC:  Process Group    Diagnoses:  ***    [facility]  TRACK: iop/dt4    NUMBER OF PARTICIPANTS: 5          Data:    Session content: At the start of this group, patients were invited to check in by identifying themselves, describing their current emotional status, and identifying issues to address in this group.   Area(s) of treatment focus addressed in this session included {OP BEH ADULT  AREA OF TREATMENT FOCUS:224371}.  ***    Therapeutic Interventions/Treatment Strategies:  {OP  THERAPEUTIC INTERVENTIONS/TREATMENT:780485}    Assessment:    Patient response:   Patient responded to session by {PATIENT RESPONSE:676900}    Possible barriers to participation / learning include: {POSSIBLE BARRIERS:383527}    Health Issues:   {YES / NO:905989}       Substance Use Review:   Substance Use: {YES / NO:548527}    Mental Status/Behavioral Observations  Appearance:   {Appearance:190870::\"Appropriate \"}  Eye Contact:   {Eye Contact:070833::\"Good \"}  Psychomotor Behavior: {Psychomotor Behavior:448654::\"Normal \"}  Attitude:   {Attitude:525026::\"Cooperative \"}  Orientation:   {Orientation:046440::\"All\"}  Speech   Rate / Production: {Speech Rate/Production:224618::\"Normal \"}   Volume:  {Speech Volume:510286::\"Normal \"}  Mood:    {Mood:555409::\"Normal\"}  Affect:    {Affect:041085::\"Appropriate \"}  Thought Content:   {Thought Content with Safety:356414}  Thought Form:  {Thought Form:919454::\"Coherent \",\"Logical \"}    Insight:    {Insight:514161::\"Good \"}    Plan:     Safety Plan: {SAFETY PLAN:447691}     Barriers to treatment: {Barriers to Treatment:213287}    Patient Contracts (see media tab):  {Patient Contracts:266955}    Substance Use: {SUBSTANCE USE " ASSESSMENT/INTERVENTION:467245}     Continue or Discharge: {Continue or Discharge:258359}      Preethi Stone, [facility]  March 27, 2025

## 2025-03-27 NOTE — GROUP NOTE
Psychoeducation Group Note    PATIENT'S NAME: Preethi Mays  MRN:   [id number]  :   1960  ACCT. NUMBER: [id number]  DATE OF SERVICE: 3/27/25  START TIME:  9:00 AM  END TIME:  9:50 AM  FACILITATOR: Indra Mercer LPC; Cate Levy OTR  TOPIC: MH Life Skills Group: Communication and Social Skills Development  [facility]  TRACK: IOP/DT-4 TRAUMA  NUMBER OF PARTICIPANTS: 6    Summary of Group / Topics Discussed:  Communication and Social Skills Development: Social Participation: Facilitated discussion on the importance of social participation in their daily lives and identified areas that they are currently participating in socially. Patients discussed the benefits of social participation on their mental wellbeing and social values. Patients were provided with an opportunity to engage in a social leisure activity. Patients identified and discussed with peers and staff regarding their experience of practicing social skills (being assertive, setting boundaries, accepting positive feedback) and ways to stay connected with others. Validation, support, and feedback was provided throughout the group process.     Patient Session Goals / Objectives:   Identified strengths and opportunities for growth in the area of social participation.   Improved awareness of important aspects of social participation for mental wellbeing.   Engage with other peers for experiential learning of social leisure skills.   Practiced and reflected on how to generalize social participation skills in their everyday life.       Patient Participation / Response:  Fully participated with the group by sharing personal reflections / insights and openly received / provided feedback with other participants.    Verbalized understanding of content, Patient would benefit from additional opportunities to practice the content to be able to generalize it to their everyday life with increased  intentionality, consistency, and efficacy in support of their psychiatric recovery, and Patient worked towards initial treatment plan goals     Treatment Plan:  Patient has a current master individualized treatment plan.  See Epic treatment plan for more information.    Indra Mercer, LPC
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
## 2025-03-27 NOTE — GROUP NOTE
Psychotherapy Group Note    PATIENT'S NAME: Preethi Mays  MRN:   [id number]  :   1960  ACCT. NUMBER: [id number]  DATE OF SERVICE: 3/27/25  START TIME: 11:00 AM  END TIME: 11:50 AM  FACILITATOR: Preethi Stone LICSW  TOPIC: MH EBP Group: Coping Skills  [facility]  TRACK: iop/dt4    NUMBER OF PARTICIPANTS: 5    Summary of Group / Topics Discussed:  Coping Skills: Relapse Planning: Patients discussed and increased understanding of how anticipating and planning for possible increased symptoms is a proactive way to reduce the likelihood of relapse.  Patients shared individualized factors that may lead to increased symptoms and decompensation in functioning.  Each patient developed a relapse prevention plan designed to help them recognize when they may have increasing symptoms requiring them to take action and notify their key supports and care team.      Patient Session Goals / Objectives:  Identify and understand what factors may contribute to symptom relapse.  Identify actions that may be taken to manage increased symptoms/stressors.  Create an individualized written relapse plan  Problem solve barriers to plan creation and implementation  Share relapse plan with key support people      Patient Participation / Response:  Fully participated with the group by sharing personal reflections / insights and openly received / provided feedback with other participants.    Demonstrated understanding of topics discussed through group discussion and participation and Expressed understanding of the relevance / importance of coping skills at distressing times in life    Treatment Plan:  Patient has a current master individualized treatment plan.  See Epic treatment plan for more information.    KVNG Shields

## 2025-04-01 ENCOUNTER — HOSPITAL ENCOUNTER (OUTPATIENT)
Dept: BEHAVIORAL HEALTH | Facility: CLINIC | Age: 65
End: 2025-04-01
Attending: PSYCHIATRY & NEUROLOGY
Payer: COMMERCIAL

## 2025-04-01 PROCEDURE — 90853 GROUP PSYCHOTHERAPY: CPT | Performed by: SOCIAL WORKER

## 2025-04-01 PROCEDURE — 90853 GROUP PSYCHOTHERAPY: CPT

## 2025-04-01 ASSESSMENT — COLUMBIA-SUICIDE SEVERITY RATING SCALE - C-SSRS
5. HAVE YOU STARTED TO WORK OUT OR WORKED OUT THE DETAILS OF HOW TO KILL YOURSELF? DO YOU INTEND TO CARRY OUT THIS PLAN?: NO
2. HAVE YOU ACTUALLY HAD ANY THOUGHTS OF KILLING YOURSELF?: NO
1. SINCE LAST CONTACT, HAVE YOU WISHED YOU WERE DEAD OR WISHED YOU COULD GO TO SLEEP AND NOT WAKE UP?: NO

## 2025-04-01 NOTE — GROUP NOTE
Psychotherapy Group Note    PATIENT'S NAME: Preethi Mays  MRN:   [id number]  :   1960  ACCT. NUMBER: [id number]  DATE OF SERVICE: 25  START TIME: 11:00 AM  END TIME: 11:50 AM  FACILITATOR: Preethi Stone LICSW  TOPIC: MH EBP Group: Relationship Skills  [facility]  TRACK: iop/dt4    NUMBER OF PARTICIPANTS: 6    Summary of Group / Topics Discussed:  Relationship Skills: Basic Communication: Patients were provided with a general overview of interpersonal communication skills and information about how communication skills impact symptoms and functioning. The goal of this topic is to help patients identify communication issues and gain skills to work towards healthier interpersonal communication. Patients reviewed their current awareness of communication issues and how communication skills impact relationships and functioning.      Patient Session Goals / Objectives:  Identified and discussed patients individual challenges with basic communication  Presented and practiced effective communication skills in session  Assisted patients in implementing more effective communication skills in their relationships      Patient Participation / Response:  Fully participated with the group by sharing personal reflections / insights and openly received / provided feedback with other participants.    Demonstrated understanding of topics discussed through group discussion and participation and Demonstrated understanding of relationship skills and communication skills    Treatment Plan:  Patient has a current master individualized treatment plan.  See Epic treatment plan for more information.    KVNG Shields

## 2025-04-01 NOTE — GROUP NOTE
Process Group Note    PATIENT'S NAME: Preethi Mays  MRN:   [id number]  :   1960  ACCT. NUMBER: [id number]  DATE OF SERVICE: 25  START TIME: 10:00 AM  END TIME: 10:50 AM  FACILITATOR: Preethi Stone LICSW  TOPIC:  Process Group    Diagnoses:  296.32 (F33.1) Major Depressive Disorder, Recurrent Episode, Moderate _ and With anxious distress.  3. Other Diagnoses that is relevant to services:   300.00 (F41.9) Unspecified Anxiety Disorder  309.81 (F43.10) Posttraumatic Stress Disorder (includes Posttraumatic Stress Disorder for Children 6 Years and Younger)  With dissociative symptoms.    [facility]  TRACK: iop/dt4    NUMBER OF PARTICIPANTS: 6        Data:    Session content: At the start of this group, patients were invited to check in by identifying themselves, describing their current emotional status, and identifying issues to address in this group.   Area(s) of treatment focus addressed in this session included Symptom Management and Personal Safety.  Whit reports feeling vulnerable and is working on the affirmation that body is safe.  She reports noticing that she is beginning to feel safer in her body.  Takes time in group to process feelings/thoughts related to estranged daughter.  Shares that she is expecting letter from daughter in next couple of days.      Therapeutic Interventions/Treatment Strategies:  Psychotherapist offered support, feedback and validation and reinforced use of skills. Treatment modalities used include Cognitive Behavioral Therapy and Dialectical Behavioral Therapy. Interventions include Other: Assisted patient  in finding ways to adapt functioning in light of past traumatic experiences and Relationship Skills: Assisted patients in implementing more effective communication skills in their relationships and Discussed strategies to promote healthier understanding of interpersonal relationships.    Assessment:    Patient  response:   Patient responded to session by accepting feedback, giving feedback, listening, focusing on goals, being attentive, accepting support, appearing alert, demonstrating behavior change, and verbalizing understanding    Possible barriers to participation / learning include: and no barriers identified    Health Issues:   None reported       Substance Use Review:   Substance Use: No active concerns identified.    Mental Status/Behavioral Observations  Appearance:   Appropriate   Eye Contact:   Good   Psychomotor Behavior: Normal   Attitude:   Cooperative   Orientation:   All  Speech   Rate / Production: Normal    Volume:  Normal   Mood:    Anxious   Affect:    Appropriate   Thought Content:   Clear  Thought Form:  Coherent  Goal Directed  Logical     Insight:    Good     Plan:   Safety Plan: No current safety concerns identified.  Recommended that patient call 911 or go to the local ED should there be a change in any of these risk factors.   Barriers to treatment: None identified  Patient Contracts (see media tab):  None  Substance Use: Not addressed in session   Continue or Discharge: Patient will continue in Adult Day Treatment (ADT)  as planned. Patient is likely to benefit from learning and using skills as they work toward the goals identified in their treatment plan.      KVNG Shields  April 1, 2025

## 2025-04-01 NOTE — GROUP NOTE
Psychoeducation Group Note    PATIENT'S NAME: Preethi Mays  MRN:   [id number]  :   1960  ACCT. NUMBER: [id number]  DATE OF SERVICE: 25  START TIME:  9:00 AM  END TIME:  9:50 AM  FACILITATOR: Indra Mercer LPC; Cate Levy OTR  TOPIC:  Life Skills Group: Life Skills  [facility] Outpatient Programs  TRACK: IOP/DT-4 TRAUMA  NUMBER OF PARTICIPANTS: 5    Topics and Objectives Discussed:  Life Skills: Life Skills Clinic: Provided opportunity for patients to independently choose and engage in a therapeutic activity that supports progress towards their goals and psychiatric recovery. Discussed the skill of behavioral activation and acting opposite to emotion to improve motivation in everyday life tasks. The Life Skills Clinic provides a context for patients to monitor their symptoms, gain self-awareness, practice skills (self-regulation, mindfulness, self-talk, focus/concentration, social, productivity), build a sense of self efficacy and mastery, as well as receive validation, support, and resources. Staff checks in, observes, assesses, and monitors performance skills and patterns in context with each group member.    Patient Session Goals and Objectives:  Independently identify a purposeful and meaningful therapeutic activity.   Identified which goal(s) they are intentionally working on during session.    Reflected on their performance and share insight about their progress.   Practiced and identified a way to generalize a skill to their everyday life.    Patient Participation / Response:  Fully participated with the group by sharing personal reflections / insights and openly received / provided feedback with other participants.    Verbalized understanding of content, Patient would benefit from additional opportunities to practice the content to be able to generalize it to their everyday life with increased intentionality, consistency, and efficacy in support of  their psychiatric recovery, and Patient worked towards initial treatment plan goals     Treatment Plan:  Patient has a current master individualized treatment plan.  See Epic treatment plan for more information.    Indra Mercer, LPC

## 2025-04-01 NOTE — GROUP NOTE
Psychotherapy Group Note    PATIENT'S NAME: Preethi Mays  MRN:   [id number]  :   1960  ACCT. NUMBER: [id number]  DATE OF SERVICE: 25  START TIME: 12:00 PM  END TIME: 12:50 PM  FACILITATOR: Preethi Stone LICSW  TOPIC: MH EBP Group: Relationship Skills  [facility] Outpatient Programs  TRACK: iop/dt4    NUMBER OF PARTICIPANTS: 6    Summary of Group / Topics Discussed:  Relationship Skills: Dependencies: Patients were provided with a general overview of different types of dependencies and how they relate to symptoms and functioning. The purpose is to help patients identify the different types of dependencies, how they may be impacted by them, and to gain awareness and skills to work towards healthier relationships.    Patient Session Goals / Objectives:  Familiarized patients with the concept of interpersonal relationships and their characteristics.    Discussed and practiced strategies to promote healthier understanding of interpersonal relationships with a focus on dependencies  Identified types of dependencies in patient s lives and how they impact their symptoms and functioning      Patient Participation / Response:  Fully participated with the group by sharing personal reflections / insights and openly received / provided feedback with other participants.    Demonstrated understanding of topics discussed through group discussion and participation and Demonstrated understanding of relationship skills and communication skills    Treatment Plan:  Patient has a current master individualized treatment plan.  See Epic treatment plan for more information.    KVNG Shields

## 2025-04-02 ENCOUNTER — HOSPITAL ENCOUNTER (OUTPATIENT)
Dept: BEHAVIORAL HEALTH | Facility: CLINIC | Age: 65
End: 2025-04-02
Attending: PSYCHIATRY & NEUROLOGY
Payer: COMMERCIAL

## 2025-04-02 PROCEDURE — 90853 GROUP PSYCHOTHERAPY: CPT

## 2025-04-02 PROCEDURE — 90853 GROUP PSYCHOTHERAPY: CPT | Performed by: SOCIAL WORKER

## 2025-04-02 NOTE — GROUP NOTE
Process Group Note    PATIENT'S NAME: Preethi Mays  MRN:   [id number]  :   1960  ACCT. NUMBER: [id number]  DATE OF SERVICE: 25  START TIME: 11:00 AM  END TIME: 11:50 AM  FACILITATOR: Preethi Stone LICSW  TOPIC:  Process Group    Diagnoses:  296.32 (F33.1) Major Depressive Disorder, Recurrent Episode, Moderate _ and With anxious distress.  3. Other Diagnoses that is relevant to services:   300.00 (F41.9) Unspecified Anxiety Disorder  309.81 (F43.10) Posttraumatic Stress Disorder (includes Posttraumatic Stress Disorder for Children 6 Years and Younger)  With dissociative symptoms.    [facility]  TRACK: iop/dt4    NUMBER OF PARTICIPANTS: 5        Data:    Session content: At the start of this group, patients were invited to check in by identifying themselves, describing their current emotional status, and identifying issues to address in this group.   Area(s) of treatment focus addressed in this session included Symptom Management and Personal Safety.  Whit shares that she has felt nervous and then relieved regarding upcoming outing with friend.  Continues to assess amount of disclosure she is comfortable with.  Declined to take time and was engaged in group    Therapeutic Interventions/Treatment Strategies:  Psychotherapist offered support, feedback and validation and reinforced use of skills. Treatment modalities used include Cognitive Behavioral Therapy and Dialectical Behavioral Therapy.    Assessment:    Patient response:   Patient responded to session by accepting feedback, giving feedback, listening, focusing on goals, being attentive, accepting support, appearing alert, demonstrating behavior change, and verbalizing understanding    Possible barriers to participation / learning include: and no barriers identified    Health Issues:   None reported       Substance Use Review:   Substance Use: No active concerns identified.    Mental  Status/Behavioral Observations  Appearance:   Appropriate   Eye Contact:   Good   Psychomotor Behavior: Normal   Attitude:   Cooperative   Orientation:   All  Speech   Rate / Production: Normal    Volume:  Normal   Mood:    Anxious   Affect:    Appropriate   Thought Content:   Clear  Thought Form:  Coherent  Goal Directed  Logical     Insight:    Good     Plan:   Safety Plan: No current safety concerns identified.  Recommended that patient call 911 or go to the local ED should there be a change in any of these risk factors.   Barriers to treatment: None identified  Patient Contracts (see media tab):  None  Substance Use: Not addressed in session   Continue or Discharge: Patient will continue in Adult Day Treatment (ADT)  as planned. Patient is likely to benefit from learning and using skills as they work toward the goals identified in their treatment plan.      Preethi Stone, [facility]  April 2, 2025

## 2025-04-02 NOTE — GROUP NOTE
Psychotherapy Group Note    PATIENT'S NAME: Preethi Mays  MRN:   [id number]  :   1960  ACCT. NUMBER: [id number]  DATE OF SERVICE: 25  START TIME: 10:00 AM  END TIME: 10:50 AM  FACILITATOR: Preethi Stone LICSW  TOPIC: MH EBP Group: Specialty Awareness  [facility] Adult [facility] Outpatient Programs  TRACK: iop/dt4    NUMBER OF PARTICIPANTS: 7    Summary of Group / Topics Discussed:  Specialty Topics: Autobiography: This topic provides each patient with an opportunity to share his/her life story by including background information, significant events that have been life changing, and a means to talk about how trauma impacted overall functioning and development.      Patient Session Goals / Objectives:  Patient  had opportunity his/her personal story and give some background on their past and/or listen to another patient share their personal story  Identified ways that trauma have impacted functioning and development  Examined their lives with trauma      Patient Participation / Response:  Fully participated with the group by sharing personal reflections / insights and openly received / provided feedback with other participants.    Demonstrated understanding of topics discussed through group discussion and participation    Treatment Plan:  Patient has a current master individualized treatment plan.  See Epic treatment plan for more information.    KVNG Shields

## 2025-04-02 NOTE — GROUP NOTE
Psychoeducation Group Note    PATIENT'S NAME: Preethi Mays  MRN:   [id number]  :   1960  ACCT. NUMBER: [id number]  DATE OF SERVICE: 25  START TIME:  9:00 AM  END TIME:  9:50 AM  FACILITATOR: Kaitlyn Gu LICSW  TOPIC: MH Wellness Group: Mental Health Maintenance  [facility]  TRACK: [facility] DT 4     NUMBER OF PARTICIPANTS: 7    Summary of Group / Topics Discussed:  Mental Health Maintenance:  Letting Go of Stress: Patients viewed 20 minute video which demonstrated simple proven techniques too quickly and effectively release stress.     Patient Session Goals / Objectives:  Patients discovered quick, easy and effective stress reduction techniques  Patients practiced techniques that were demonstrated on the video  Patients identified one technique they will use to cope with symptoms      Patient Participation / Response:  Fully participated with the group by sharing personal reflections / insights and openly received / provided feedback with other participants.    Demonstrated understanding of topics discussed through group discussion and participation, Identified / Expressed personal readiness to practice skills, and Verbalized understanding of mental health maintenance topic    Treatment Plan:  Patient has a current master individualized treatment plan.  See Epic treatment plan for more information.    KVNG Craven

## 2025-04-02 NOTE — GROUP NOTE
Psychotherapy Group Note    PATIENT'S NAME: Preethi Mays  MRN:   [id number]  :   1960  ACCT. NUMBER: [id number]  DATE OF SERVICE: 25  START TIME: 12:00 PM  END TIME: 12:50 PM  FACILITATOR: Preethi Stone LICSW  TOPIC: MH EBP Group: Coping Skills  [facility] Adult Mental [facility] Outpatient Programs  TRACK: iop/dt4    NUMBER OF PARTICIPANTS: 5    Summary of Group / Topics Discussed:  Coping Skills: Additional Coping Skills:  Patients discussed and practiced use of variety of coping skills.  Reviewed the benefits of applying the aforementioned coping strategies.  Patients explored how these strategies might be applied to daily stressors or distressing situations.    Patient Session Goals / Objectives:  Understand the purpose and benefits of applying  coping strategies  Understand areas of coping skills          Patient Participation / Response:  Fully participated with the group by sharing personal reflections / insights and openly received / provided feedback with other participants.    Demonstrated understanding of topics discussed through group discussion and participation    Treatment Plan:  Patient has a current master individualized treatment plan.  See Epic treatment plan for more information.    KVNG Shields

## 2025-04-03 ENCOUNTER — HOSPITAL ENCOUNTER (OUTPATIENT)
Dept: BEHAVIORAL HEALTH | Facility: CLINIC | Age: 65
End: 2025-04-03
Attending: PSYCHIATRY & NEUROLOGY
Payer: COMMERCIAL

## 2025-04-03 PROCEDURE — 90853 GROUP PSYCHOTHERAPY: CPT

## 2025-04-03 PROCEDURE — 90853 GROUP PSYCHOTHERAPY: CPT | Performed by: SOCIAL WORKER

## 2025-04-03 NOTE — GROUP NOTE
Psychoeducation Group Note    PATIENT'S NAME: Preethi Mays  MRN:   [id number]  :   1960  ACCT. NUMBER: [id number]  DATE OF SERVICE: 25  START TIME:  9:00 AM  END TIME:  9:50 AM  FACILITATOR: Indra Mercer LPC; Cate Levy OTR  TOPIC: MH Life Skills Group: Lifestyle Balance and Structure  [facility] Adult [facility] Outpatient Programs  TRACK: IOP/DT-4 TRAUMA  NUMBER OF PARTICIPANTS: 6    Summary of Group / Topics Discussed:  Lifestyle Balance and Strucure:  Routines, Habits, Rituals, and Roles: Patients were assisted to identify meaningful roles that they want to promote and the impact their mental health symptoms have on this.  Patients learned, applied, and generalized skills needed to live and participate in meaningful roles as effectively and independently as possible.  Patients developed awareness of strengths and challenges in fulfilling these roles and worked on integrating specific and individualized rituals and habits into their daily life.     Patient Session Goals / Objectives:  Improved awareness and engagement in life s meaningful roles, routines, habits, and rituals  Explored and identified current roles and challenges due to mental health symptoms   Identified ways to establish and integrate daily self care and wellness routines and habits to support mental health recovery  Practiced and reflected on how to generalize taught skills to their everyday life      Patient Participation / Response:  Fully participated with the group by sharing personal reflections / insights and openly received / provided feedback with other participants.    Verbalized understanding of content, Patient would benefit from additional opportunities to practice the content to be able to generalize it to their everyday life with increased intentionality, consistency, and efficacy in support of their psychiatric recovery, and Patient worked towards initial treatment plan goals     Treatment  Plan:  Patient has a current master individualized treatment plan.  See Epic treatment plan for more information.    Indra Mercer, LPC

## 2025-04-03 NOTE — GROUP NOTE
Psychotherapy Group Note    PATIENT'S NAME: Preethi Mays  MRN:   [id number]  :   1960  ACCT. NUMBER: [id number]  DATE OF SERVICE: 25  START TIME: 12:00 PM  END TIME: 12:50 PM  FACILITATOR: Preethi Stone LICSW  TOPIC: MH EBP Group: Specialty Awareness  [facility] Adult Mental Health Outpatient Programs  TRACK: iop/dt4    NUMBER OF PARTICIPANTS: 6    Summary of Group / Topics Discussed:  Specialty Topics: Autobiography: This topic provides each patient with an opportunity to share his/her life story by including background information, significant events that have been life changing, and a means to talk about how mental health and trauma has impacted overall functioning and development.      Patient Session Goals / Objectives:  Patient  had opportunity his/her personal story and give some background on their past and/or listen to another patient share their personal story  Identified ways that has  use has impacted functioning and development  Examined their lives with  the impact of trauma      Patient Participation / Response:  Fully participated with the group by sharing personal reflections / insights and openly received / provided feedback with other participants.    Demonstrated understanding of topics discussed through group discussion and participation    Treatment Plan:  Patient has a current master individualized treatment plan.  See Epic treatment plan for more information.    KVNG Shields

## 2025-04-03 NOTE — GROUP NOTE
"Process Group Note    PATIENT'S NAME: Preethi Mays  MRN:   [id number]  :   1960  ACCT. NUMBER: [id number]  DATE OF SERVICE: 25  START TIME: 10:00 AM  END TIME: 10:50 AM  FACILITATOR: Preethi Stone LICSW  TOPIC:  Process Group    Diagnoses:  296.32 (F33.1) Major Depressive Disorder, Recurrent Episode, Moderate _ and With anxious distress.  3. Other Diagnoses that is relevant to services:   300.00 (F41.9) Unspecified Anxiety Disorder  309.81 (F43.10) Posttraumatic Stress Disorder (includes Posttraumatic Stress Disorder for Children 6 Years and Younger)  With dissociative symptoms.    [facility] Adult Mental Health Outpatient Programs  TRACK: iop/dt 4    NUMBER OF PARTICIPANTS: 6        Data:    Session content: At the start of this group, patients were invited to check in by identifying themselves, describing their current emotional status, and identifying issues to address in this group.   Area(s) of treatment focus addressed in this session included Symptom Management and Personal Safety.  Whit reports feeling \"mentally good and physically sore\".  Takes time in group to share insights regarding affirmation she had last night.  Reframing \"masking\" and making choices regarding disclosure .      Therapeutic Interventions/Treatment Strategies:  Psychotherapist offered support, feedback and validation and reinforced use of skills. Treatment modalities used include Cognitive Behavioral Therapy and Dialectical Behavioral Therapy. Interventions include Cognitive Restructuring:  Assisted patient in formulating new neutral/positive alternatives to challenge less helpful / ineffective thoughts and Assisted patient in identifying new neutral/positive core beliefs.    Assessment:    Patient response:   Patient responded to session by accepting feedback, giving feedback, listening, focusing on goals, being attentive, accepting support, appearing alert, demonstrating behavior change, and verbalizing " understanding    Possible barriers to participation / learning include: and no barriers identified    Health Issues:   None reported       Substance Use Review:   Substance Use: No active concerns identified.    Mental Status/Behavioral Observations  Appearance:   Appropriate   Eye Contact:   Good   Psychomotor Behavior: Normal   Attitude:   Cooperative   Orientation:   All  Speech   Rate / Production: Normal    Volume:  Normal   Mood:    Anxious  Normal  Affect:    Appropriate   Thought Content:   Clear  Thought Form:  Coherent  Goal Directed  Logical     Insight:    Good     Plan:   Safety Plan: No current safety concerns identified.  Recommended that patient call 911 or go to the local ED should there be a change in any of these risk factors.   Barriers to treatment: None identified  Patient Contracts (see media tab):  None  Substance Use: Not addressed in session   Continue or Discharge: Patient will continue in Adult Day Treatment (ADT)  as planned. Patient is likely to benefit from learning and using skills as they work toward the goals identified in their treatment plan.      Preethi Stone [facility]  April 3, 2025

## 2025-04-03 NOTE — GROUP NOTE
Psychotherapy Group Note    PATIENT'S NAME: Preethi Mays  MRN:   [id number]  :   1960  ACCT. NUMBER: [id number]  DATE OF SERVICE: 25  START TIME: 11:00 AM  END TIME: 11:50 AM  FACILITATOR: Preethi Stone LICSW  TOPIC: MH EBP Group: Relationship Skills  [facility] Mental [facility] Outpatient Programs  TRACK: iop/dt4    NUMBER OF PARTICIPANTS: 6    Summary of Group / Topics Discussed:  Relationship Skills: Basic Communication: Patients were provided with a general overview of interpersonal communication skills and information about how communication skills impact symptoms and functioning. The goal of this topic is to help patients identify communication issues and gain skills to work towards healthier interpersonal communication. Patients reviewed their current awareness of communication issues and how communication skills impact relationships and functioning.      Patient Session Goals / Objectives:  Identified and discussed patients individual challenges with basic communication  Presented and practiced effective communication skills in session  Assisted patients in implementing more effective communication skills in their relationships      Patient Participation / Response:  Fully participated with the group by sharing personal reflections / insights and openly received / provided feedback with other participants.    Demonstrated understanding of topics discussed through group discussion and participation    Treatment Plan:  Patient has a current master individualized treatment plan.  See Epic treatment plan for more information.    KVNG Shields

## 2025-04-04 ENCOUNTER — HOSPITAL ENCOUNTER (OUTPATIENT)
Dept: BEHAVIORAL HEALTH | Facility: CLINIC | Age: 65
Discharge: HOME | End: 2025-04-04
Attending: PSYCHIATRY & NEUROLOGY
Payer: COMMERCIAL

## 2025-04-04 DIAGNOSIS — F33.1 MODERATE EPISODE OF RECURRENT MAJOR DEPRESSIVE DISORDER (H): Primary | ICD-10-CM

## 2025-04-04 PROCEDURE — 90853 GROUP PSYCHOTHERAPY: CPT | Performed by: SOCIAL WORKER

## 2025-04-04 PROCEDURE — 90853 GROUP PSYCHOTHERAPY: CPT | Performed by: COUNSELOR

## 2025-04-04 NOTE — GROUP NOTE
"Process Group Note    PATIENT'S NAME: Preethi Mays  MRN:   [id number]  :   1960  ACCT. NUMBER: [id number]  DATE OF SERVICE: 25  START TIME: 10:00 AM  END TIME: 10:50 AM  FACILITATOR: Preethi Stone LICSW  TOPIC:  Process Group    Diagnoses:  296.32 (F33.1) Major Depressive Disorder, Recurrent Episode, Moderate _ and With anxious distress.  3. Other Diagnoses that is relevant to services:   300.00 (F41.9) Unspecified Anxiety Disorder  309.81 (F43.10) Posttraumatic Stress Disorder (includes Posttraumatic Stress Disorder for Children 6 Years and Younger)  With dissociative symptoms.    [facility] Adult Mental Health Outpatient Programs  TRACK: iop/dt4    NUMBER OF PARTICIPANTS: 7        Data:    Session content: At the start of this group, patients were invited to check in by identifying themselves, describing their current emotional status, and identifying issues to address in this group.   Area(s) of treatment focus addressed in this session included Symptom Management and Personal Safety.  Whit reports feeling \"oxygenated\" and reports working on goals.  Had dinner with daughter last night and daughter tried on dress that Whit had made.  Whit is proud of the dress, stating it fits well.  Takes time in group to share that negative voice challenging the affirmations that she is beginning to incorporate.    Therapeutic Interventions/Treatment Strategies:  Psychotherapist offered support, feedback and validation and reinforced use of skills. Treatment modalities used include Cognitive Behavioral Therapy and Dialectical Behavioral Therapy. Interventions include Cognitive Restructuring:  Facilitated recognition of the connection between negative thoughts and negative core beliefs and Assisted patient in identifying new neutral/positive core beliefs.    Assessment:    Patient response:   Patient responded to session by accepting feedback, giving feedback, listening, focusing on goals, being " attentive, accepting support, appearing alert, demonstrating behavior change, and verbalizing understanding    Possible barriers to participation / learning include: and no barriers identified    Health Issues:   None reported       Substance Use Review:   Substance Use: No active concerns identified.    Mental Status/Behavioral Observations  Appearance:   Appropriate   Eye Contact:   Good   Psychomotor Behavior: Normal   Attitude:   Cooperative   Orientation:   All  Speech   Rate / Production: Normal    Volume:  Normal   Mood:    Anxious  Normal  Affect:    Appropriate   Thought Content:   Clear  Thought Form:  Coherent  Goal Directed  Logical     Insight:    Good     Plan:   Safety Plan: No current safety concerns identified.  Recommended that patient call 911 or go to the local ED should there be a change in any of these risk factors.   Barriers to treatment: None identified  Patient Contracts (see media tab):  None  Substance Use: Not addressed in session   Continue or Discharge: Patient will continue in Adult Day Treatment (ADT)  as planned. Patient is likely to benefit from learning and using skills as they work toward the goals identified in their treatment plan.      Preethi Stone [facility]  April 4, 2025

## 2025-04-04 NOTE — GROUP NOTE
Psychotherapy Group Note    PATIENT'S NAME: Preethi Mays  MRN:   [id number]  :   1960  ACCT. NUMBER: [id number]  DATE OF SERVICE: 25  START TIME: 11:00 AM  END TIME: 11:50 AM  FACILITATOR: Preethi tSone LICSW  TOPIC:  EBP Group: Enhanced Mindfulness  [facility] Adult Mental Health Outpatient Programs  TRACK: iop/dt4    NUMBER OF PARTICIPANTS: 7    Summary of Group / Topics Discussed:  Enhanced Mindfulness: Body and Mind Integration: Patients received an overview and education regarding the importance of including the body in the management of emotional health and self-care and as a direct route to mindfulness practice.  Patients discussed various ways in which the body can serve as an informant to their physical and emotional experiences/need. Patients discussed the body as a direct link to the present moment and to mindfulness practice.  Patients discussed current relationship with body, self-awareness, mindfulness practice and barriers to connection with body.  Patients were guided through breath work and movement to facilitate greater self-awareness, grounding, self-expression, and connection to other.  Patients discussed how the experiential could be applied to better manage mental health and develop loving connection to self.    Patient Session Goals / Objectives:  Identify how movement awareness could be used for grounding, stress management, self-expression, connection to other and self-regulation  Improved awareness of breath and movement preferences  Identify how movement and the body is used in mindfulness practice  Reflect on use of these practices in everyday life.  Identify barriers to attending to body      Patient Participation / Response:  Fully participated with the group by sharing personal reflections / insights and openly received / provided feedback with other participants.    Demonstrated understanding of topics discussed through group discussion and participation  and Practiced skills in session    Treatment Plan:  Patient has a current master individualized treatment plan.  See Epic treatment plan for more information.    ARIANNE ShieldsSW

## 2025-04-04 NOTE — GROUP NOTE
Psychoeducation Group Note    PATIENT'S NAME: Preethi Mays  MRN:   [id number]  :   1960  ACCT. NUMBER: [id number]  DATE OF SERVICE: 25  START TIME:  9:00 AM  END TIME:  9:50 AM  FACILITATOR: Preethi Stone LICSW; Yocasta Gusman RN  TOPIC:  Wellness Group: [facility] Adult Mental Health Outpatient Programs  TRACK: iop/dt4    NUMBER OF PARTICIPANTS: 7    Summary of Group / Topics Discussed:  [facility]:  [facility]: Social Anxiety    Patient Session Goals / Objectives:  Attend to patient's discussion questions regarding social anxiety  Provide validation and ideas for coping skills surrounding interpersonal effectiveness  Redirect patients as needed toward social anxiety topic                 Patient Participation / Response:  Fully participated with the group by sharing personal reflections / insights and openly received / provided feedback with other participants.    Demonstrated understanding of topics discussed through group discussion and participation and Identified / Expressed personal readiness to practice skills    Treatment Plan:  Patient has a current master individualized treatment plan.  See Epic treatment plan for more information.    KVNG Shields

## 2025-04-05 NOTE — GROUP NOTE
Psychoeducation Group Note    PATIENT'S NAME: Preethi Mays  MRN:   [id number]  :   1960  ACCT. NUMBER: [id number]  DATE OF SERVICE: 25  START TIME: 12:00 PM  END TIME: 12:50 PM  FACILITATOR: Eugene Vallejo LMFT  TOPIC: MH Life Skills Group: Communication and Social Skills Development  [facility] Mental [facility] Outpatient Programs  TRACK: IOPDT4    NUMBER OF PARTICIPANTS: 6    Summary of Group / Topics Discussed:  Communication and Social Skills Development: Communication Styles: Anatomy of Trust: BRAVING Skill: Patients were taught and gained awareness of interpersonal relationships, specifically trust.  Patients watched a video by Wilfred Arrington and were introduced to the acronym BRAVING as a tool to reflect on issues in important relationships in a more manageable way and use skills to express the true issue of the conflict.  Patients also reflected on self trust and self compassion.      Patient Session Goals / Objectives:  Identified skills that help improve interpersonal relationships and express conflict      Improved awareness of important aspects of trust in interpersonal relationships and how this relates to mental health recovery      Established a plan for practice of these skills in their own environments  Practiced and reflected on how to generalize taught skills to their everyday life      Patient Participation / Response:  Fully participated with the group by sharing personal reflections / insights and openly received / provided feedback with other participants.    Demonstrated understanding of content through group participation     Treatment Plan:  Patient has a current master individualized treatment plan.  See Epic treatment plan for more information.    AYESHA Cano

## 2025-04-08 ENCOUNTER — HOSPITAL ENCOUNTER (OUTPATIENT)
Dept: BEHAVIORAL HEALTH | Facility: CLINIC | Age: 65
End: 2025-04-08
Attending: PSYCHIATRY & NEUROLOGY
Payer: COMMERCIAL

## 2025-04-08 PROCEDURE — 90853 GROUP PSYCHOTHERAPY: CPT | Performed by: SOCIAL WORKER

## 2025-04-08 PROCEDURE — 90853 GROUP PSYCHOTHERAPY: CPT

## 2025-04-08 NOTE — GROUP NOTE
Process Group Note    PATIENT'S NAME: Preethi Mays  MRN:   [id number]  :   1960  ACCT. NUMBER: [id number]  DATE OF SERVICE: 25  START TIME: 11:00 AM  END TIME: 11:50 AM  FACILITATOR: Preethi Stone LICSW  TOPIC:  Process Group    Diagnoses:  296.32 (F33.1) Major Depressive Disorder, Recurrent Episode, Moderate _ and With anxious distress.  3. Other Diagnoses that is relevant to services:   300.00 (F41.9) Unspecified Anxiety Disorder  309.81 (F43.10) Posttraumatic Stress Disorder (includes Posttraumatic Stress Disorder for Children 6 Years and Younger)  With dissociative symptoms.    [facility] Mental [facility] Outpatient Programs  TRACK: iop/dt4    NUMBER OF PARTICIPANTS: 7        Data:    Session content: At the start of this group, patients were invited to check in by identifying themselves, describing their current emotional status, and identifying issues to address in this group.   Area(s) of treatment focus addressed in this session included Symptom Management and Personal Safety.  Whit shares that she is feeling disoriented this am.  Takes time in group to share that she spoke with daughter, reviewing letter that she has sent.  She states that she was proud of way in which she prepared for conversation. Conversation to be continued next week.  Whit also shares that she was uncomfortable with therapist's response and is seeking a new therapist    Therapeutic Interventions/Treatment Strategies:  Psychotherapist offered support, feedback and validation and reinforced use of skills. Treatment modalities used include Cognitive Behavioral Therapy and Dialectical Behavioral Therapy. Interventions include Relationship Skills: Assisted patients in implementing more effective communication skills in their relationships, Encouraged development and maintenance  of healthy boundaries, and Discussed strategies to promote healthier understanding of interpersonal  relationships.    Assessment:    Patient response:   Patient responded to session by accepting feedback, giving feedback, listening, focusing on goals, being attentive, accepting support, appearing alert, demonstrating behavior change, and verbalizing understanding    Possible barriers to participation / learning include: and no barriers identified    Health Issues:   None reported       Substance Use Review:   Substance Use: No active concerns identified.    Mental Status/Behavioral Observations  Appearance:   Appropriate   Eye Contact:   Good   Psychomotor Behavior: Normal   Attitude:   Cooperative   Orientation:   All  Speech   Rate / Production: Normal    Volume:  Normal   Mood:    Anxious   Affect:    Appropriate   Thought Content:   Clear  Thought Form:  Coherent  Goal Directed  Logical     Insight:    Good     Plan:   Safety Plan: No current safety concerns identified.  Recommended that patient call 911 or go to the local ED should there be a change in any of these risk factors.   Barriers to treatment: None identified  Patient Contracts (see media tab):  None  Substance Use: Not addressed in session   Continue or Discharge: Patient will continue in Adult Day Treatment (ADT)  as planned. Patient is likely to benefit from learning and using skills as they work toward the goals identified in their treatment plan.      Preethi Stone, [facility]  April 8, 2025

## 2025-04-08 NOTE — GROUP NOTE
Psychoeducation Group Note    PATIENT'S NAME: Preethi Mays  MRN:   [id number]  :   1960  ACCT. NUMBER: [id number]  DATE OF SERVICE: 25  START TIME:  9:00 AM  END TIME:  9:50 AM  FACILITATOR: Indra Mercer LPC; Cate Levy OTR  TOPIC:  Life Skills Group: Life Skills  [facility] Adult Mental [facility] Outpatient Programs  TRACK: IOP/DT-4 TRAUMA  NUMBER OF PARTICIPANTS: 7    Group Topics and Activities Discussed: Life Skills: Life Skills Clinic:  Provided opportunity for patients to independently choose and engage in a therapeutic activity that supports progress towards their goals and psychiatric recovery. Discussed the skill of behavioral activation and acting opposite to emotion to improve motivation in everyday life tasks. The Life Skills Clinic provides a context for patients to monitor their symptoms, gain self-awareness, practice skills (self-regulation, mindfulness, self-talk, focus/concentration, social, productivity), build a sense of self efficacy and mastery, as well as receive validation, support, and resources. Staff checks in, observes, assesses, and monitors performance skills and patterns in context with each group member.      Patient Session Goals / Objectives:  Independently identify a purposeful and meaningful therapeutic activity.   Identified which goal(s) they are intentionally working on during session.    Reflected on their performance and share insight about their progress.   Practiced and identified a way to generalize a skill to their everyday life.       Patient Participation / Response:  Fully participated with the group by sharing personal reflections / insights and openly received / provided feedback with other participants.    Verbalized understanding of content, Patient would benefit from additional opportunities to practice the content to be able to generalize it to their everyday life with increased intentionality, consistency, and efficacy in  support of their psychiatric recovery, and Patient worked towards initial treatment plan goals     Treatment Plan:  Patient has a current master individualized treatment plan.  See Epic treatment plan for more information.    Indra Mercer, LPC

## 2025-04-08 NOTE — GROUP NOTE
Psychotherapy Group Note    PATIENT'S NAME: Preethi Mays  MRN:   [id number]  :   1960  ACCT. NUMBER: [id number]  DATE OF SERVICE: 25  START TIME: 12:00 PM  END TIME: 12:50 PM  FACILITATOR: Preethi Stone LICSW  TOPIC: MH EBP Group: Specialty Awareness  [facility] Adult Mental Health Outpatient Programs  TRACK: iop/dt4    NUMBER OF PARTICIPANTS: 7    Summary of Group / Topics Discussed:  Specialty Topics: Autobiography: This topic provides each patient with an opportunity to share his/her life story by including background information, significant events that have been life changing, and a means to talk about how trauma have impacted overall functioning and development.      Patient Session Goals / Objectives:  Patient  had opportunity his/her personal story and give some background on their past and/or listen to another patient share their personal story  Identified ways that trauma have impacted functioning and development  Examined their lives with trauma      Patient Participation / Response:  Fully participated with the group by sharing personal reflections / insights and openly received / provided feedback with other participants.    Demonstrated understanding of topics discussed through group discussion and participation    Treatment Plan:  Patient has a current master individualized treatment plan.  See Epic treatment plan for more information.    KVNG Shields

## 2025-04-08 NOTE — GROUP NOTE
Psychotherapy Group Note    PATIENT'S NAME: Preethi Mays  MRN:   [id number]  :   1960  ACCT. NUMBER: [id number]  DATE OF SERVICE: 25  START TIME: 10:00 AM  END TIME: 10:50 AM  FACILITATOR: Preethi Stone LICSW  TOPIC: MH EBP Group: Behavioral Activation  [facility]  TRACK: iop/dt4    NUMBER OF PARTICIPANTS: 7    Summary of Group / Topics Discussed:  Behavioral Activation: Introduction and Overview of Behavioral Activation: Patients explored how behaviors affect mood and interact with thoughts and feelings (CBT/DBT).   Discussions included reviewing the benefits of making behavioral changes, and the barriers to doing so.  Specific skills introduced: taking small steps, increasing motivation, decreasing procrastination and withdrawal.  Encouraged patient exploration / reflection on the relationship between their behaviors, thoughts, and feelings.    Patient Session Goals / Objectives:  Understand the importance of behavioral patterns and how making behavioral changes benefits overall mental and physical health.  Share experiences and challenges with making behavioral changes    Identify personal barriers to change      Patient Participation / Response:  Fully participated with the group by sharing personal reflections / insights and openly received / provided feedback with other participants.    Demonstrated understanding of topics discussed through group discussion and participation and Expressed understanding of the relationship between behaviors, thoughts, and feelings    Treatment Plan:  Patient has a current master individualized treatment plan.  See Epic treatment plan for more information.    KVNG Shields

## 2025-04-09 ENCOUNTER — HOSPITAL ENCOUNTER (OUTPATIENT)
Dept: BEHAVIORAL HEALTH | Facility: CLINIC | Age: 65
End: 2025-04-09
Attending: PSYCHIATRY & NEUROLOGY
Payer: COMMERCIAL

## 2025-04-09 DIAGNOSIS — F43.10 POSTTRAUMATIC STRESS DISORDER: ICD-10-CM

## 2025-04-09 DIAGNOSIS — F33.1 MAJOR DEPRESSIVE DISORDER, RECURRENT EPISODE, MODERATE (H): Primary | ICD-10-CM

## 2025-04-09 PROCEDURE — 90853 GROUP PSYCHOTHERAPY: CPT | Performed by: SOCIAL WORKER

## 2025-04-09 PROCEDURE — 90853 GROUP PSYCHOTHERAPY: CPT

## 2025-04-09 NOTE — GROUP NOTE
"Process Group Note    PATIENT'S NAME: Preethi Mays  MRN:   [id number]  :   1960  ACCT. NUMBER: [id number]  DATE OF SERVICE: 25  START TIME: 11:00 AM  END TIME: 11:50 AM  FACILITATOR: Preethi Stone LICSW  TOPIC:  Process Group    Diagnoses:  296.32 (F33.1) Major Depressive Disorder, Recurrent Episode, Moderate _ and With anxious distress.  3. Other Diagnoses that is relevant to services:   300.00 (F41.9) Unspecified Anxiety Disorder  309.81 (F43.10) Posttraumatic Stress Disorder (includes Posttraumatic Stress Disorder for Children 6 Years and Younger)  With dissociative symptoms.    [facility] Adult Mental Health Outpatient Programs  TRACK: iop/dt4    NUMBER OF PARTICIPANTS: 7        Data:    Session content: At the start of this group, patients were invited to check in by identifying themselves, describing their current emotional status, and identifying issues to address in this group.   Area(s) of treatment focus addressed in this session included Symptom Management and Personal Safety.  Whit describes feeling uncertain.  Using COPE AHEAD to prepare for next conversation with daughter.  Dinner plans tonight.  Practising grounding and \"being in body\"    Therapeutic Interventions/Treatment Strategies:  Psychotherapist offered support, feedback and validation and reinforced use of skills. Treatment modalities used include Cognitive Behavioral Therapy and Dialectical Behavioral Therapy.    Assessment:    Patient response:   Patient responded to session by accepting feedback, giving feedback, listening, focusing on goals, being attentive, accepting support, appearing alert, demonstrating behavior change, and verbalizing understanding    Possible barriers to participation / learning include: and no barriers identified    Health Issues:   None reported       Substance Use Review:   Substance Use: No active concerns identified.    Mental Status/Behavioral " Observations  Appearance:   Appropriate   Eye Contact:   Good   Psychomotor Behavior: Normal   Attitude:   Cooperative   Orientation:   All  Speech   Rate / Production: Normal    Volume:  Normal   Mood:    Anxious   Affect:    Appropriate   Thought Content:   Clear  Thought Form:  Coherent  Goal Directed  Logical     Insight:    Good     Plan:   Safety Plan: No current safety concerns identified.  Recommended that patient call 911 or go to the local ED should there be a change in any of these risk factors.   Barriers to treatment: None identified  Patient Contracts (see media tab):  None  Substance Use: Not addressed in session   Continue or Discharge: Patient will continue in Adult Day Treatment (ADT)  as planned. Patient is likely to benefit from learning and using skills as they work toward the goals identified in their treatment plan.      Preethi Stone, [facility]  April 9, 2025

## 2025-04-09 NOTE — GROUP NOTE
"  Psychotherapy Group Note    PATIENT'S NAME: Preethi Mays  MRN:   [id number]  :   1960  ACCT. NUMBER: [id number]  DATE OF SERVICE: 25  START TIME:  9:00 AM  END TIME:  9:50 AM  FACILITATOR: Kaitlyn Gu LICSW  TOPIC: MH EBP Group: Specialty Awareness  [facility] Adult Mental Health Outpatient Programs  TRACK: IOP DT 4     NUMBER OF PARTICIPANTS: 6    Summary of Group / Topics Discussed:    Grounding Through Brain Games  Discuss how we can use our own mind to ground ourselves without tools or guided prompts  Discuss ABC's grounding method  Engage in staff-designed \"Scattergories\" game to practice the ABC's grounding technique.      Patient Participation / Response:  Fully participated with the group by sharing personal reflections / insights and openly received / provided feedback with other participants.    Demonstrated understanding of topics discussed through group discussion and participation, Identified / Expressed readiness to act on skill suggestions discussed in topic, and Verbalized understanding of ways to proactively manage illness    Treatment Plan:  Patient has a current master individualized treatment plan.  See Epic treatment plan for more information.    KVNG Craven   "

## 2025-04-09 NOTE — GROUP NOTE
Psychotherapy Group Note    PATIENT'S NAME: Preethi Mays  MRN:   [id number]  :   1960  ACCT. NUMBER: [id number]  DATE OF SERVICE: 25  START TIME: 12:00 PM  END TIME: 12:50 PM  FACILITATOR: Preethi Stone LICSW  TOPIC: MH EBP Group: Specialty Awareness  [facility] Adult Mental Health Outpatient Programs  TRACK: iop/dt4    NUMBER OF PARTICIPANTS: 6    Summary of Group / Topics Discussed:  Specialty Topics: Hope: The topic of hope was presented in order to help patients better understand the symptoms of hopelessness and how to become more hopeful. Patients discussed their current awareness of the topic and relevance to their functioning. Individual experiences with symptoms and treatment options were also discussed. Patients explored options for ongoing/future treatment and symptom management.      Patient Session Goals / Objectives:  Discussed definition of hopelessness  Discussed how hopelessness impacts functioning  Set a plan to utilize skills to reduce hopelessness      Patient Participation / Response:  Fully participated with the group by sharing personal reflections / insights and openly received / provided feedback with other participants.    Demonstrated understanding of topics discussed through group discussion and participation    Treatment Plan:  Patient has a current master individualized treatment plan.  See Epic treatment plan for more information.    KVNG Shields

## 2025-04-09 NOTE — GROUP NOTE
Psychotherapy Group Note    PATIENT'S NAME: Preethi Mays  MRN:   [id number]  :   1960  ACCT. NUMBER: [id number]  DATE OF SERVICE: 25  START TIME: 10:00 AM  END TIME: 10:50 AM  FACILITATOR: Preethi Stone LICSW  TOPIC: MH EBP Group: Behavioral Activation  [facility] Mental Health Outpatient Programs  TRACK: iop/dt4    NUMBER OF PARTICIPANTS: 6    Summary of Group / Topics Discussed:  Behavioral Activation: The Change Process - Behavior Change: Patients explored the process and types of change, including but not limited to, theories of change, steps to making change, methods of changing behavior, and potential barriers.  Patients worked to identify what changes may benefit their daily lives, and work towards a plan to implement change.      Patient Session Goals / Objectives:  Demonstrate understanding of the change process.    Identify positive and negative behavioral patterns.  Make plans to track and implement changes and share experiences in group.  Identify personal barriers to change      Patient Participation / Response:  Fully participated with the group by sharing personal reflections / insights and openly received / provided feedback with other participants.    Demonstrated understanding of topics discussed through group discussion and participation and Expressed understanding of the relationship between behaviors, thoughts, and feelings    Treatment Plan:  Patient has a current master individualized treatment plan.  See Epic treatment plan for more information.    KVNG Shields

## 2025-04-09 NOTE — PROGRESS NOTES
"[facility]   Adult Mental Health Outpatient Programs  Psychiatric Progress Record    Program Track: IOP/DT 4 T [place]    PATIENT'S NAME: Preethi Mays  MRN:   [id number]  :   1960  ACCT. NUMBER: [id number]  DATE OF SERVICE: 25    Interval History:  \"better.\" Whit presents today for follow-up and ongoing program supervision.   Endorses:  I am doing better. I am sad because people are discharging, it was a good closed group  Benefiting definitively, I was not sure it is going to happen  Appetite is better. I am cooking again  Sleep is better  Mood is situational but more stable. I am tolerating the change in increase of Buspirone and started Sertraline  Met with outpatient provider, we are meeting next Tuesday  No safety concerns    Review of Symptoms  Sleep: see above  Appetite: above  Suicidal ideation: denies current or recent suicidal ideation or behavior  Thoughts of non-suicidal self-injury: denied  Recent self-injurious behavior: denied  Homicidal ideation: denied  Other safety concerns: denied    Substance use:  Denies    Medications:  Current Outpatient Medications   Medication Sig Dispense Refill    busPIRone HCl (BUSPAR) 30 MG tablet Take 1 tablet (30 mg) by mouth 2 times daily. 60 tablet 1    Calcium 200 MG TABS Take 1 tablet by mouth daily.      cyclobenzaprine (FLEXERIL) 5 MG tablet Take 5 mg by mouth daily as needed for muscle spasms      cycloSPORINE (RESTASIS) 0.05 % ophthalmic emulsion 1 drop 2 times daily      doxycycline (PERIOSTAT) 20 MG tablet Take 20 mg by mouth 2 times daily      doxylamine (UNISOM) 25 MG TABS tablet Take 12.5 mg by mouth nightly as needed for sleep.      DI-M9-T00-[id number] 3-Phytosterols (BP VIT 3) 1 MG CAPS One capsule BID      fluorometholone (FML LIQUIFILM) 0.1 % ophthalmic suspension Place 1 drop into both eyes as needed.      ketoconazole (NIZORAL) 2 % external cream APPLY EXTERNALLY TO FEET TWICE DAILY      metroNIDAZOLE (METROGEL) 1 % external " "gel APPLY TOPICALLY TO FACE EVERY NIGHT AT BEDTIME      multivitamin w/minerals (THERA-VIT-M) tablet Take 1 tablet by mouth daily      Rizatriptan Benzoate (MAXALT PO) Take 10 mg by mouth as needed for migraine      sertraline (ZOLOFT) 50 MG tablet Take one tab daily 30 tablet 1    Vitamin D, Cholecalciferol, 1000 units CAPS Take 2 tablets by mouth daily 100 capsule 3       The above list was reviewed and updated in [facility] with patient today.     Patient is taking medications as prescribed and denies adverse effects    Laboratory Results:  Most recent labs reviewed. Pertinent updates/findings: None.     Mental Status Examination:  Vital Signs: There were no vitals taken for this visit.   Appearance: adequately groomed, appears stated age, and in no apparent distress.  Attitude: cooperative   Eye Contact: good   Muscle Strength and Tone: normal  Psychomotor Behavior: normal or unremarkable   Gait and Station: normal width, turn, arm swing  Speech: clear, coherent, decreased prosody, regular rate, regular rhythm, and fluent  Associations: No loosening of associations  Thought Process: coherent and goal directed  Thought Content: no evidence of suicidal ideation or homicidal ideation, no evidence of psychotic thought, no auditory hallucinations present, and no visual hallucinations present  Mood: \"anxious, sad , depressed, and better\"  Affect: mood congruent  Insight: good  Judgment: intact, adequate for safety  Impulse Control: intact  Oriented to: time, place, person, and situation  Attention Span and Concentration: Normal  Language: Intact  Recent and Remote Memory: Delayed & immediate recall intact  Fund of Knowledge/Assessment of Intelligence: Average  Capacity of Activities of Daily Living: Independent, able to participate in programmatic care services.    Diagnosis/es:    ICD-10-CM    1. Major depressive disorder, recurrent episode, moderate (H)  F33.1       2. Posttraumatic stress disorder  F43.10         Other " Diagnoses that is relevant to services:     300.00 (F41.9) Unspecified Anxiety Disorder    Assessment/Plan:  Whit presents today for follow-up psychiatric evaluation and assessment of progress. Endorses overall improvement in mood and anxiety, tolerating well the newly changes in medications. Has follow up appointment with outpatient provider. No safety concerns  Will continue to attend [facility] psychotherapy to sustain current gains, benefit with peer support and mitigate safety risks.   Follow up in 3 weeks or sooner as needed    Depression  Overall improved  Continue to engage in psychotherapy  Continue to work with outpatient provider for medication management  Continue with current medication regimen  Buspirone 30 mg twice daily   Sertraline 50 mg daily  Melatonin 5 mg bedtime  Continue to improve sleep hygiene  Continue to maintain a balanced diet  Continue to engage in physical activities as tolerated     PTSD  Overall improved  As above    Safety Assessment:  Whit reports suicidal ideation and/or non-suicidal self-injury or thoughts thereof as noted above  Whit is future-oriented and is engaged in treatment planning   I do not feel that Whit meets criteria for a 72-hour involuntary hold and remains appropriate for an outpatient level of care    Johnson Celestin DNP on 4/9/2025 at 12:30 PM    Visit Details:  Type of service: In-person  Location (patient and provider): [facility] Adult Mental Health Outpatient Programmatic Care Offices    Level of Medical Decision Making:   - At least 1 chronic problem that is not stable  - Engaged in prescription drug management during visit (discussed any medication benefits, side effects, alternatives, etc.)  Discussion of management or test interpretation with external physician/other qualified healthcare professional/appropriate source - programmatic care multidisciplinary treatment team    30 min spent on the date of the encounter in chart review, patient visit, review of  tests, documentation, care coordination, and/or discussion with other providers about the issues documented above.      This document completed in part using yuil[employer] SL dictation software and therefore may contain inadvertent word or phrase substitutions.

## 2025-04-10 ENCOUNTER — HOSPITAL ENCOUNTER (OUTPATIENT)
Dept: BEHAVIORAL HEALTH | Facility: CLINIC | Age: 65
End: 2025-04-10
Attending: PSYCHIATRY & NEUROLOGY
Payer: COMMERCIAL

## 2025-04-10 PROCEDURE — 90853 GROUP PSYCHOTHERAPY: CPT

## 2025-04-10 PROCEDURE — 90853 GROUP PSYCHOTHERAPY: CPT | Performed by: SOCIAL WORKER

## 2025-04-10 NOTE — GROUP NOTE
Psychotherapy Group Note    PATIENT'S NAME: Preethi Mays  MRN:   [id number]  :   1960  ACCT. NUMBER: [id number]  DATE OF SERVICE: 4/10/25  START TIME: 11:00 AM  END TIME: 11:50 AM  FACILITATOR: Preethi Stone LICSW  TOPIC: MH EBP Group: Specialty Awareness  [facility] Adult Mental Health Outpatient Programs  TRACK: iop/dt4    NUMBER OF PARTICIPANTS: 7    Summary of Group / Topics Discussed:  Specialty Topics: Life Transitions: The topic of life transitions was presented in order to help patients to better understand the challenges presented by life transitions, and how to best navigate them. Exploring the phases of transition and how one works through them was discussed. Patients were provided with information regarding community resources.     Patient Session Goals / Objectives:  Discussed the timing and nature of major life transitions  Explored how life transitions may impact mental health and functioning  Discussed coping strategies to manage symptoms and help with transitioning  Discussed and planned a successful transition      Patient Participation / Response:  Fully participated with the group by sharing personal reflections / insights and openly received / provided feedback with other participants.    Demonstrated understanding of topics discussed through group discussion and participation and Identified / Expressed readiness to act on skill suggestions discussed in topic    Treatment Plan:  Patient has a current master individualized treatment plan.  See Epic treatment plan for more information.    KVNG Shields

## 2025-04-10 NOTE — GROUP NOTE
Psychotherapy Group Note    PATIENT'S NAME: Preethi Mays  MRN:   [id number]  :   1960  ACCT. NUMBER: [id number]  DATE OF SERVICE: 4/10/25  START TIME: 12:00 PM  END TIME: 12:50 PM  FACILITATOR: Preethi Stone LICSW  TOPIC: MH EBP Group: Behavioral Activation  [facility]  TRACK: iop/dt4    NUMBER OF PARTICIPANTS: 6    Summary of Group / Topics Discussed:  Behavioral Activation: The Change Process - Behavior Change: Patients explored the process and types of change, including but not limited to, theories of change, steps to making change, methods of changing behavior, and potential barriers.  Patients worked to identify what changes may benefit their daily lives, and work towards a plan to implement change.      Patient Session Goals / Objectives:  Demonstrate understanding of the change process.    Identify positive and negative behavioral patterns.  Make plans to track and implement changes and share experiences in group.  Identify personal barriers to change      Patient Participation / Response:  Fully participated with the group by sharing personal reflections / insights and openly received / provided feedback with other participants.    Demonstrated understanding of topics discussed through group discussion and participation and Expressed understanding of the relationship between behaviors, thoughts, and feelings    Treatment Plan:  Patient has a current master individualized treatment plan.  See Epic treatment plan for more information.    KVNG Shields

## 2025-04-10 NOTE — GROUP NOTE
Psychoeducation Group Note    PATIENT'S NAME: Preethi Mays  MRN:   [id number]  :   1960  ACCT. NUMBER: [id number]  DATE OF SERVICE: 4/10/25  START TIME:  9:00 AM  END TIME:  9:50 AM  FACILITATOR: Indra Mercer LPC  TOPIC: MH Life Skills Group: Resiliency Development  [facility]patient [facility]  TRACK: IOP/DT-1 YOUNG ADULT  NUMBER OF PARTICIPANTS: 7    Summary of Group / Topics Discussed:  Resiliency Development: ABCs of Coping: Reviewed signs of stress including physical changes, behavior changes, and changes to thoughts and emotions. Provided education on the benefits of developing a robust coping plan to help manage distress and regulate emotions. Discussed the importance of having easy access to this plan in times of increased symptoms. Brainstormed with group members to identify several coping skills for each letter of the alphabet in order to develop a list that includes variety and depth. Prompted patients to identify at least one new skill from the list they are willing to try.     Patient Session Goals / Objectives:  Review the signs and symptoms of stress and establish a need for stress management strategies.   Identify benefits of having a robust coping plan to help manage distress and regulate emotions.   Develop a list of coping skills to promote self-regulation.   Establish a plan for practice of these skills in their own environments.      Patient Participation / Response:  Fully participated with the group by sharing personal reflections / insights and openly received / provided feedback with other participants.    Verbalized understanding of content, Patient would benefit from additional opportunities to practice the content to be able to generalize it to their everyday life with increased intentionality, consistency, and efficacy in support of their psychiatric recovery, and Patient worked towards initial treatment plan goals     Treatment  Plan:  Patient has a current master individualized treatment plan.  See Epic treatment plan for more information.    Indra Mercer, LPC

## 2025-04-10 NOTE — GROUP NOTE
"Process Group Note    PATIENT'S NAME: Preethi Mays  MRN:   [id number]  :   1960  ACCT. NUMBER: [id number]  DATE OF SERVICE: 4/10/25  START TIME: 10:00 AM  END TIME: 10:50 AM  FACILITATOR: Preethi Stone LICSW  TOPIC:  Process Group    Diagnoses:  296.32 (F33.1) Major Depressive Disorder, Recurrent Episode, Moderate _ and With anxious distress.  3. Other Diagnoses that is relevant to services:   300.00 (F41.9) Unspecified Anxiety Disorder  309.81 (F43.10) Posttraumatic Stress Disorder (includes Posttraumatic Stress Disorder for Children 6 Years and Younger)  With dissociative symptoms.    [facility] Adult Mental Health Outpatient Programs  TRACK: iop/dt4    NUMBER OF PARTICIPANTS: 6        Data:    Session content: At the start of this group, patients were invited to check in by identifying themselves, describing their current emotional status, and identifying issues to address in this group.   Area(s) of treatment focus addressed in this session included Symptom Management and Personal Safety.  Whit reports feeling hopeful, worked on goals. States that she is using affirmation of \"I can trust myself\" and is more aware of breathing patterns.  Some anxiety regarding social engagement tonight. Takes time in group to ask group for feedback regarding a change in individual therapists    Therapeutic Interventions/Treatment Strategies:  Psychotherapist offered support, feedback and validation and reinforced use of skills. Treatment modalities used include Cognitive Behavioral Therapy and Dialectical Behavioral Therapy. Interventions include Cognitive Restructuring:  Assisted patient in identifying new neutral/positive core beliefs, Other: Assisted patient  in finding ways to adapt functioning in light of past traumatic experiences and Discussed ways to increase hopefulness, and Relationship Skills: Assisted patients in implementing more effective communication skills in their " relationships.    Assessment:    Patient response:   Patient responded to session by accepting feedback, giving feedback, listening, focusing on goals, being attentive, accepting support, appearing alert, demonstrating behavior change, and verbalizing understanding    Possible barriers to participation / learning include: and no barriers identified    Health Issues:   None reported       Substance Use Review:   Substance Use: No active concerns identified.    Mental Status/Behavioral Observations  Appearance:   Appropriate   Eye Contact:   Good   Psychomotor Behavior: Normal   Attitude:   Cooperative   Orientation:   All  Speech   Rate / Production: Normal    Volume:  Normal   Mood:    Anxious  Normal hopeful  Affect:    Appropriate   Thought Content:   Clear  Thought Form:  Coherent  Goal Directed  Logical     Insight:    Good     Plan:   Safety Plan: No current safety concerns identified.  Recommended that patient call 911 or go to the local ED should there be a change in any of these risk factors.   Barriers to treatment: None identified  Patient Contracts (see media tab):  None  Substance Use: Not addressed in session   Continue or Discharge: Patient will continue in Adult Day Treatment (ADT)  as planned. Patient is likely to benefit from learning and using skills as they work toward the goals identified in their treatment plan.      Preethi Stone, [facility]  April 10, 2025

## 2025-04-15 ENCOUNTER — HOSPITAL ENCOUNTER (OUTPATIENT)
Dept: BEHAVIORAL HEALTH | Facility: CLINIC | Age: 65
End: 2025-04-15
Attending: PSYCHIATRY & NEUROLOGY
Payer: COMMERCIAL

## 2025-04-15 ENCOUNTER — VIRTUAL VISIT (OUTPATIENT)
Dept: PSYCHIATRY | Facility: CLINIC | Age: 65
End: 2025-04-15
Attending: NURSE PRACTITIONER
Payer: COMMERCIAL

## 2025-04-15 DIAGNOSIS — F43.10 PTSD (POST-TRAUMATIC STRESS DISORDER): ICD-10-CM

## 2025-04-15 PROCEDURE — 90853 GROUP PSYCHOTHERAPY: CPT | Performed by: SOCIAL WORKER

## 2025-04-15 PROCEDURE — 90853 GROUP PSYCHOTHERAPY: CPT

## 2025-04-15 PROCEDURE — 90853 GROUP PSYCHOTHERAPY: CPT | Performed by: PSYCHOLOGIST

## 2025-04-15 ASSESSMENT — PAIN SCALES - GENERAL: PAINLEVEL_OUTOF10: NO PAIN (0)

## 2025-04-15 NOTE — GROUP NOTE
"Process Group Note    PATIENT'S NAME: Preethi Mays  MRN:   [id number]  :   1960  ACCT. NUMBER: [id number]  DATE OF SERVICE: 4/15/25  START TIME: 10:00 AM  END TIME: 10:50 AM  FACILITATOR: Preethi Villar LICSW  TOPIC: MH Process Group    Diagnoses:  ***    [facility] Outpatient Programs  TRACK: IOP/DT4    NUMBER OF PARTICIPANTS: 6          Data:    Session content: At the start of this group, patients were invited to check in by identifying themselves, describing their current emotional status, and identifying issues to address in this group.   Area(s) of treatment focus addressed in this session included {OP BEH ADULT  AREA OF TREATMENT FOCUS:497067}.  ***    Therapeutic Interventions/Treatment Strategies:  {OP  THERAPEUTIC INTERVENTIONS/TREATMENT:391548}    Assessment:    Patient response:   Patient responded to session by {PATIENT RESPONSE:111938}    Possible barriers to participation / learning include: {POSSIBLE BARRIERS:587097}    Health Issues:   {YES / NO:572987}       Substance Use Review:   Substance Use: {YES / NO:974744}    Mental Status/Behavioral Observations  Appearance:   {Appearance:018573::\"Appropriate \"}  Eye Contact:   {Eye Contact:764707::\"Good \"}  Psychomotor Behavior: {Psychomotor Behavior:953889::\"Normal \"}  Attitude:   {Attitude:996419::\"Cooperative \"}  Orientation:   {Orientation:866799::\"All\"}  Speech   Rate / Production: {Speech Rate/Production:147570::\"Normal \"}   Volume:  {Speech Volume:525864::\"Normal \"}  Mood:    {Mood:832047::\"Normal\"}  Affect:    {Affect:263022::\"Appropriate \"}  Thought Content:   {Thought Content with Safety:072126}  Thought Form:  {Thought Form:642811::\"Coherent \",\"Logical \"}    Insight:    {Insight:023626::\"Good \"}    Plan:     Safety Plan: {SAFETY PLAN:869192}     Barriers to treatment: {Barriers to Treatment:130931}    Patient Contracts (see media tab):  {Patient Contracts:644618}    Substance Use: {SUBSTANCE USE " ASSESSMENT/INTERVENTION:223216}     Continue or Discharge: {Continue or Discharge:062721}      Preethi Villar, [facility]  April 15, 2025
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
## 2025-04-15 NOTE — PATIENT INSTRUCTIONS
**For crisis resources, please see the information at the end of this document**   Patient Education    Thank you for coming to the [facility] [place] [facility].     Lab Testing:  If you had lab testing today and your results are reassuring or normal they will be mailed to you or sent through [employer] within 7 days. If the lab tests need quick action we will call you with the results. The phone number we will call with results is # [phone]. If this is not the best number please call our clinic and change the number.     Medication Refills:  If you need any refills please call your pharmacy and they will contact us. Our fax number for refills is [phone].   Three business days of notice are needed for general medication refill requests.   Five business days of notice are needed for controlled substance refill requests.   If you need to change to a different pharmacy, please contact the new pharmacy directly. The new pharmacy will help you get your medications transferred.     Contact Us:  Please call [phone] during business hours (8-5:00 M-F).   If you have medication related questions after clinic hours, or on the weekend, please call [phone].     Financial Assistance [phone]   Medical Records [phone]       MENTAL HEALTH CRISIS RESOURCES:  For a emergency help, please call 911 or go to the nearest Emergency Department.     Emergency Walk-In Options:   Em[facility] Unit @ [facility] ([place]): [phone] - Specialized mental health emergency area designed to be calming  [facility] ([place]): [phone]  [facility] ([place]): [phone]  [facility]): [phone]    [facility] Crisis Information:   [place]: [phone]  [place]: [phone]  [place] ([place]) - Adult: [phone]     Child: [phone]  [place] - Adult: [phone]     Child: [phone]  [place]:  [phone]  List of all [facility] resources:   https://[place].gov/dhs/people-we-serve/adults/health-care/mental-health/resources/crisis-contacts.jsp    National Crisis Information:   Crisis Text Line: Text  [place]  to 292972  Suicide & Crisis Lifeline: 988  National Suicide Prevention Lifeline: 8-024-342-TALK (1-781.760.6544)       For online chat options, visit https://suicidepreventionlifeline.org/chat/  Poison Control Center: 9-166-639-9454  Trans Lifeline: 9-315-231-9990 - Hotline for transgender people of all ages  The [employer]ast[employer] Project: 9-190-057-2946 - Hotline for LGBT youth     For Non-Emergency Support:   Fast Tracker: Mental Health & Substance Use Disorder Resources -   https://www.[employer]axis[employer]INTREorg SYSTEMSn.org/

## 2025-04-15 NOTE — GROUP NOTE
"Process Group Note     PATIENT'S NAME:    Preethi Mays  MRN:                           [id number]  :                           1960  ACCT. NUMBER:       [id number]  DATE OF SERVICE:  4/15/25  START TIME:  10:00 AM  END TIME:  10:50 AM  FACILITATOR: Preethi Villar, [facility]  TOPIC:  Process Group     Diagnoses:  296.32 (F33.1) Major Depressive Disorder, Recurrent Episode, Moderate _ and With anxious distress.  3. Other Diagnoses that is relevant to services:   300.00 (F41.9) Unspecified Anxiety Disorder  309.81 (F43.10) Posttraumatic Stress Disorder (includes Posttraumatic Stress Disorder for Children 6 Years and Younger)  With dissociative symptoms     [facility] Adult Mental Health Outpatient Programs  TRACK: IOP/DT4     NUMBER OF PARTICIPANTS: 6           Data:     Session content: At the start of this group, patients were invited to check in by identifying themselves, describing their current emotional status, and identifying issues to address in this group.   Area(s) of treatment focus addressed in this session included Symptom Management, Personal Safety, and Community Resources/Discharge Planning.  Whit reported feeling satisfied.   Her goal is to rest and use the affirmation \"I can trust myself\".  She also plans to attend a virtual psychiatry appointment today. They shared that they will use skills of using coping cards she created.  A barrier is mood shifts.  Client denied reported a low level of suicidal ideation.  Client denied chemical use.   Client is taking medications.   They are proud for using skills to prepare for a phone conversation with her daughter and spouse around communication and boundaries.      4/15/2025    11:00 AM   Suicide Ideation Check In   Since last session, how often have you had suicidal thoughts? No thoughts of suicide, but wish I were dead             Therapeutic Interventions/Treatment Strategies:  Psychotherapist offered support, feedback and validation " and reinforced use of skills. Treatment modalities used include Cognitive Behavioral Therapy. Interventions include Coping Skills: Assisted patient in identifying 1-2 healthy distraction skills to reduce overall distress.     Assessment:     Patient response:   Patient responded to session by listening and focusing on goals     Possible barriers to participation / learning include: and no barriers identified     Health Issues:              None reported                  Substance Use Review:              Substance Use: No active concerns identified.     Mental Status/Behavioral Observations  Appearance:                            Appropriate   Eye Contact:                           Good   Psychomotor Behavior:          Normal   Attitude:                                   Cooperative  Interested  Orientation:                             All  Speech              Rate / Production:       Normal               Volume:                       Normal   Mood:                                      Anxious  Depressed   Affect:                                      Appropriate   Thought Content:                    Safety reports  presence of suicidal ideation passive suicidal ideation   Thought Form:                        Coherent  Logical     Insight:                                     Good      Plan:   Safety Plan: No current safety concerns identified.  Recommended that patient call 911 or go to the local ED should there be a change in any of these risk factors.   Barriers to treatment: None identified  Patient Contracts (see media tab):  None  Substance Use: Not addressed in session   Continue or Discharge: Patient will continue in Adult Day Treatment (ADT)  as planned. Patient is likely to benefit from learning and using skills as they work toward the goals identified in their treatment plan.        Preethi Villar, [facility]                    April 15, 2025

## 2025-04-15 NOTE — NURSING NOTE
Current patient location: [address] [place] [id number]    Is the patient currently in the state of [place]? YES    Visit mode: VIDEO    If the visit is dropped, the patient can be reconnected by:VIDEO VISIT: Text to cell phone:   Telephone Information:   Mobile [phone]       Will anyone else be joining the visit? NO  (If patient encounters technical issues they should call [phone]    Are changes needed to the allergy or medication list? No    Are refills needed on medications prescribed by this physician? YES    Rooming Documentation:  Not applicable    Reason for visit: RANDAL HICKSF

## 2025-04-15 NOTE — GROUP NOTE
Psychotherapy Group Note    PATIENT'S NAME: Preethi Mays  MRN:   [id number]  :   1960  ACCT. NUMBER: [id number]  DATE OF SERVICE: 4/15/25  START TIME: 12:00 PM  END TIME: 12:50 PM  FACILITATOR: Verenice Spencer LICSW  TOPIC:  EBP Group: Symptom Awareness  [facility] Mental Health Outpatient Programs  TRACK: IOP/DT 4 T    NUMBER OF PARTICIPANTS: 5    Summary of Group / Topics Discussed:  Symptom Awareness: Symptom Observation and Tracking: An overview of symptom observation and tracking was presented to help patients identify specific symptoms and identify patterns. This topic will also assist patient in identifying progress towards goal of decreasing severity of symptoms and increasing overall functioning. Patients completed a symptom check list in session. Patient was assisted in identifying baseline functioning, patterns, and ways to assess current symptoms. Patient was also assisted in identifying a tool or strategy to continue to track or monitor symptoms over a period of time.       Patient Session Goals / Objectives:  Identified patient individual symptoms and experiences  Identified potential symptom patterns and factors that contribute to changes in symptom severity      Patient Participation / Response:  Fully participated with the group by sharing personal reflections / insights and openly received / provided feedback with other participants.    Demonstrated understanding of topics discussed through group discussion and participation, Demonstrated understanding of how information regarding symptoms can assist in management of symptoms, Identified / Expressed personal readiness to increase awareness of symptoms and apply skills as necessary, Verbalized understanding of how awareness can benefit mental health symptoms , and Identified plan to address barriers to increase awareness and knowledge about diagnoses and symptoms     Treatment Plan:  Patient has a current master  individualized treatment plan.  See Epic treatment plan for more information.    ARIANNE JjSW

## 2025-04-15 NOTE — GROUP NOTE
Psychoeducation Group Note    PATIENT'S NAME: Preethi Mays  MRN:   [id number]  :   1960  ACCT. NUMBER: [id number]  DATE OF SERVICE: 4/15/25  START TIME:  9:00 AM  END TIME:  9:50 AM  FACILITATOR: Melissa Mac, [facility]; Cate Levy OTR  TOPIC:  Life Skills Group: Life Skills  [facility] Adult Mental Health Outpatient Programs  TRACK: IOP/DT 4    NUMBER OF PARTICIPANTS: 5    Summary of Group / Topics Discussed:  Life Skills:  Life Skills Clinic: Provided opportunity for patients to independently choose and engage in a therapeutic activity that supports progress towards their goals and psychiatric recovery. Discussed the skill of behavioral activation and acting opposite to emotion to improve motivation in everyday life tasks. The Life Skills Clinic provides a context for patients to monitor their symptoms, gain self-awareness, practice skills (self-regulation, mindfulness, self-talk, focus/concentration, social, productivity), build a sense of self efficacy and mastery, as well as receive validation, support, and resources. Staff checks in, observes, assesses, and monitors performance skills and patterns in context with each group member.      Patient Session Goals / Objectives:   Independently identify a purposeful and meaningful therapeutic activity.   Identified which goal(s) they are intentionally working on during session.   Reflected on their performance and share insight about their progress.   Practiced and identified a way to generalize a skill to their everyday life.     Spent group time writing her short story.    Patient Participation / Response:  Fully participated with the group by sharing personal reflections / insights and openly received / provided feedback with other participants.    Verbalized understanding of content, Patient would benefit from additional opportunities to practice the content to be able to generalize it to their everyday life with increased  intentionality, consistency, and efficacy in support of their psychiatric recovery, and Patient worked towards initial treatment plan goals     Treatment Plan:  Patient has a current master individualized treatment plan.  See Epic treatment plan for more information.    Melissa Mac, LPCC

## 2025-04-15 NOTE — GROUP NOTE
"Process Group Note    PATIENT'S NAME: Preethi Mays  MRN:   [id number]  :   1960  ACCT. NUMBER: [id number]  DATE OF SERVICE: 4/15/25  START TIME:  9:00 AM  END TIME:  9:50 AM  FACILITATOR: Preethi Villar [facility]  TOPIC:  Process Group    Diagnoses:  296.32 (F33.1) Major Depressive Disorder, Recurrent Episode, Moderate _ and With anxious distress.  3. Other Diagnoses that is relevant to services:   300.00 (F41.9) Unspecified Anxiety Disorder  309.81 (F43.10) Posttraumatic Stress Disorder (includes Posttraumatic Stress Disorder for Children 6 Years and Younger)  With dissociative symptoms    [facility] Adult Mental Health Outpatient Programs  TRACK: IOP/DT4    NUMBER OF PARTICIPANTS: 6        Data:    Session content: At the start of this group, patients were invited to check in by identifying themselves, describing their current emotional status, and identifying issues to address in this group.   Area(s) of treatment focus addressed in this session included Symptom Management, Personal Safety, and Community Resources/Discharge Planning.  Whit reported feeling satisfied.   Her goal is to rest and use the affirmation \"I can trust myself\".  She also plans to attend a virtual psychiatry appointment today. They shared that they will use skills of using coping cards she created.  A barrier is mood shifts.  Client denied reported a low level of suicidal ideation.  Client denied chemical use.   Client is taking medications.   They are proud for using skills to prepare for a phone conversation with her daughter and spouse around communication and boundaries.    Therapeutic Interventions/Treatment Strategies:  Psychotherapist offered support, feedback and validation and reinforced use of skills. Treatment modalities used include Cognitive Behavioral Therapy. Interventions include Coping Skills: Assisted patient in identifying 1-2 healthy distraction skills to reduce overall " distress.    Assessment:    Patient response:   Patient responded to session by listening and focusing on goals    Possible barriers to participation / learning include: and no barriers identified    Health Issues:   None reported       Substance Use Review:   Substance Use: No active concerns identified.    Mental Status/Behavioral Observations  Appearance:   Appropriate   Eye Contact:   Good   Psychomotor Behavior: Normal   Attitude:   Cooperative  Interested  Orientation:   All  Speech   Rate / Production: Normal    Volume:  Normal   Mood:    Anxious  Depressed   Affect:    Appropriate   Thought Content:   Safety reports  presence of suicidal ideation passive suicidal ideation   Thought Form:  Coherent  Logical     Insight:    Good     Plan:   Safety Plan: No current safety concerns identified.  Recommended that patient call 911 or go to the local ED should there be a change in any of these risk factors.   Barriers to treatment: None identified  Patient Contracts (see media tab):  None  Substance Use: Not addressed in session   Continue or Discharge: Patient will continue in Adult Day Treatment (ADT)  as planned. Patient is likely to benefit from learning and using skills as they work toward the goals identified in their treatment plan.      Preethi Villar, [facility]  April 15, 2025

## 2025-04-15 NOTE — GROUP NOTE
Psychotherapy Group Note    PATIENT'S NAME: Preethi Mays  MRN:   [id number]  :   1960  ACCT. NUMBER: [id number]  DATE OF SERVICE: 4/15/25  START TIME: 11:00 AM  END TIME: 11:50 AM  FACILITATOR: Estefany Hernandez Psy.D, LP  TOPIC:  EBP Group: Emotions Management  [facility] Mental Health Outpatient Programs  TRACK: iop/dt4 trauma    NUMBER OF PARTICIPANTS: 6    Summary of Group / Topics Discussed:  Emotions Management: Understanding Emotions: Patients discussed the purpose of emotions and function they serve in our lives.  Reviewed core emotions, why they happen (triggers), and how they occur. The group assisted one anothers' understanding that: emotional experiences are important; difficult emotions have a place in our lives; and the differences between various emotions.    Patient Session Goals / Objectives:  Demonstrate understanding of types various emotions  Identify and discuss specific emotions and when they occur; understand triggers  Discuss barriers to emotional regulation      Patient Participation / Response:  Fully participated with the group by sharing personal reflections / insights and openly received / provided feedback with other participants.    Self-aware of experiences with difficult emotions, and strategies to employ to manage them    Treatment Plan:  Patient has a current master individualized treatment plan.  See Epic treatment plan for more information.    Estefany Hernandez Psy.D, LP

## 2025-04-16 ENCOUNTER — HOSPITAL ENCOUNTER (OUTPATIENT)
Dept: BEHAVIORAL HEALTH | Facility: CLINIC | Age: 65
End: 2025-04-16
Attending: PSYCHIATRY & NEUROLOGY
Payer: COMMERCIAL

## 2025-04-16 PROCEDURE — 90853 GROUP PSYCHOTHERAPY: CPT

## 2025-04-16 PROCEDURE — 90853 GROUP PSYCHOTHERAPY: CPT | Performed by: SOCIAL WORKER

## 2025-04-16 NOTE — GROUP NOTE
"Psychoeducation Group Note    PATIENT'S NAME: Preethi Mays  MRN:   [id number]  :   1960  ACCT. NUMBER: [id number]  DATE OF SERVICE: 25  START TIME:  9:00 AM  END TIME:  9:50 AM  FACILITATOR: Kaitlyn Gu LICSW; Yocasta Gusman RN  TOPIC:  Wellness Group: [facility] Adult Mental Health Outpatient Programs  TRACK: IOP DT 4     NUMBER OF PARTICIPANTS: 6    Summary of Group / Topics Discussed:  [facility]:  [facility]:     Self Compassion and Group Cohesion through \"Pick or Pass\"    Pts spent time journaling on self-esteem-based prompts independently. Patients were then asked to practice giving themselves compliments in the therapeutic space of group. Patients then engaged in a group game called  pick or pass,  in which a deck of cards is passed around with a positive quality/strength on each card. Each group member could choose to  pick  the card if they personally identified with the quality or choose to  pass  the card to a group member they feel exemplifies the quality listed on the card.       Patient Session Goals and Objectives:   -Practiced positive self-talk   -Challenged negative self-talk    -Practiced giving group members compliments and practiced receiving compliments from other group members.       Patient Participation / Response:  Fully participated with the group by sharing personal reflections / insights and openly received / provided feedback with other participants.    Identified / Expressed personal readiness to practice skills    Treatment Plan:  Patient has a current master individualized treatment plan.  See Epic treatment plan for more information.    KVNG Craven  "

## 2025-04-16 NOTE — GROUP NOTE
"Process Group Note    PATIENT'S NAME: Preethi Mays  MRN:   [id number]  :   1960  ACCT. NUMBER: [id number]  DATE OF SERVICE: 25  START TIME: 10:00 AM  END TIME: 10:50 AM  FACILITATOR: Preethi Stone LICSW  TOPIC:  Process Group    Diagnoses:  296.32 (F33.1) Major Depressive Disorder, Recurrent Episode, Moderate _ and With anxious distress.  3. Other Diagnoses that is relevant to services:   300.00 (F41.9) Unspecified Anxiety Disorder  309.81 (F43.10) Posttraumatic Stress Disorder (includes Posttraumatic Stress Disorder for Children 6 Years and Younger)  With dissociative symptoms.    [facility] Adult Mental Health Outpatient Programs  TRACK: iop/dt4    NUMBER OF PARTICIPANTS: 6        Data:    Session content: At the start of this group, patients were invited to check in by identifying themselves, describing their current emotional status, and identifying issues to address in this group.   Area(s) of treatment focus addressed in this session included Symptom Management and Personal Safety.  Whit states that she feels \"driven\" Reports sleeping in until 715.  Utilizing affirmation of \"I am not too much\".  States that she feels need to rest and is aware of resting being difficult for her.  Shared that she used \"FENT\" skills in conversation with daughter and shared that daughter has sent pictures of grandson.    Therapeutic Interventions/Treatment Strategies:  Psychotherapist offered support, feedback and validation and reinforced use of skills. Treatment modalities used include Cognitive Behavioral Therapy and Dialectical Behavioral Therapy.    Assessment:    Patient response:   Patient responded to session by accepting feedback, giving feedback, listening, focusing on goals, being attentive, accepting support, appearing alert, demonstrating behavior change, and verbalizing understanding    Possible barriers to participation / learning include: and no barriers identified    Health " Issues:   None reported       Substance Use Review:   Substance Use: No active concerns identified.    Mental Status/Behavioral Observations  Appearance:   Appropriate   Eye Contact:   Good   Psychomotor Behavior: Normal   Attitude:   Cooperative   Orientation:   All  Speech   Rate / Production: Normal    Volume:  Normal   Mood:    Normal  Affect:    Appropriate   Thought Content:   Clear  Thought Form:  Coherent  Goal Directed  Logical     Insight:    Good     Plan:   Safety Plan: No current safety concerns identified.  Recommended that patient call 911 or go to the local ED should there be a change in any of these risk factors.   Barriers to treatment: None identified  Patient Contracts (see media tab):  None  Substance Use: Not addressed in session   Continue or Discharge: Patient will continue in Adult Day Treatment (ADT)  as planned. Patient is likely to benefit from learning and using skills as they work toward the goals identified in their treatment plan.      Preethi Stone, [facility]  April 16, 2025

## 2025-04-16 NOTE — GROUP NOTE
Psychotherapy Group Note    PATIENT'S NAME: Preethi Mays  MRN:   [id number]  :   1960  ACCT. NUMBER: [id number]  DATE OF SERVICE: 25  START TIME: 12:00 PM  END TIME: 12:50 PM  FACILITATOR: Preethi Stone LICSW  TOPIC: MH EBP Group: Coping Skills  [facility] Adult Mental Health Outpatient Programs  TRACK: iop/dt4    NUMBER OF PARTICIPANTS: 6    Summary of Group / Topics Discussed:  Coping Skills: Additional Coping Skills:  Patients discussed and practiced a variety of coping skills to be used in varying situations.  Reviewed the benefits of applying the aforementioned coping strategies.  Patients explored how these strategies might be applied to daily stressors or distressing situations.    Patient Session Goals / Objectives:  Understand the purpose and benefits of applying a variety coping strategies      Address barriers to utilizing coping skills when in distress.      Patient Participation / Response:  Fully participated with the group by sharing personal reflections / insights and openly received / provided feedback with other participants.    Demonstrated understanding of topics discussed through group discussion and participation and Expressed understanding of the relevance / importance of coping skills at distressing times in life    Treatment Plan:  Patient has a current master individualized treatment plan.  See Epic treatment plan for more information.    KVNG Shields

## 2025-04-16 NOTE — GROUP NOTE
Psychotherapy Group Note    PATIENT'S NAME: Preethi Mays  MRN:   [id number]  :   1960  ACCT. NUMBER: [id number]  DATE OF SERVICE: 25  START TIME: 11:00 AM  END TIME: 11:50 AM  FACILITATOR: Preethi Stone LICSW  TOPIC: MH EBP Group: Specialty Awareness  [facility] Adult Mental Health Outpatient Programs  TRACK: iop/dt4    NUMBER OF PARTICIPANTS: 6    Summary of Group / Topics Discussed:  Specialty Topics: Goal Setting: Provided education and assessment on patient's group programming goals. Evaluated patient's assessment of their ability to participate in groups, share emotions with group members, and openness to the group format. Provided education regarding appropriate individual-based group therapy goals.     Patient Session Goals and Objectives:  -Participate in reflective assessment of group readiness.  -Understand appropriate topics and methods to discuss those topics in group programming.  -Identify and problem solve barriers to group participation.  -Identify strategies to actively cope while engaging in group programming.   -Reflected up individualized treatment plans  -Meet with members of the treatment team to assess goal progress       Patient Participation / Response:  Fully participated with the group by sharing personal reflections / insights and openly received / provided feedback with other participants.    Demonstrated understanding of topics discussed through group discussion and participation and Identified / Expressed readiness to act on skill suggestions discussed in topic    Treatment Plan:  Patient has a current master individualized treatment plan.  See Epic treatment plan for more information.    KVNG Shields

## 2025-04-17 ENCOUNTER — HOSPITAL ENCOUNTER (OUTPATIENT)
Dept: BEHAVIORAL HEALTH | Facility: CLINIC | Age: 65
End: 2025-04-17
Attending: PSYCHIATRY & NEUROLOGY
Payer: COMMERCIAL

## 2025-04-17 DIAGNOSIS — F33.1 MODERATE EPISODE OF RECURRENT MAJOR DEPRESSIVE DISORDER (H): Primary | ICD-10-CM

## 2025-04-17 PROCEDURE — 90853 GROUP PSYCHOTHERAPY: CPT | Performed by: COUNSELOR

## 2025-04-17 PROCEDURE — 90853 GROUP PSYCHOTHERAPY: CPT

## 2025-04-17 PROCEDURE — 90853 GROUP PSYCHOTHERAPY: CPT | Performed by: SOCIAL WORKER

## 2025-04-17 NOTE — GROUP NOTE
"Process Group Note    PATIENT'S NAME: Preethi Mays  MRN:   [id number]  :   1960  ACCT. NUMBER: [id number]  DATE OF SERVICE: 25  START TIME: 10:00 AM  END TIME: 10:50 AM  FACILITATOR: Tiffany Tate [facility]  TOPIC:  Process Group    Diagnoses:  296.32 (F33.1) Major Depressive Disorder, Recurrent Episode, Moderate _ and With anxious distress.  3. Other Diagnoses that is relevant to services:   300.00 (F41.9) Unspecified Anxiety Disorder  309.81 (F43.10) Posttraumatic Stress Disorder (includes Posttraumatic Stress Disorder for Children 6 Years and Younger)  With dissociative symptoms.    [facility] Adult [facility] Health Outpatient Programs  TRACK: IOP/DT 2    NUMBER OF PARTICIPANTS: 6        Data:    Session content: At the start of this group, patients were invited to check in by identifying themselves, describing their current emotional status, and identifying issues to address in this group.     Area(s) of treatment focus addressed in this session included Symptom Management, Personal Safety, Community Resources/Discharge Planning, Abstinence/Relapse Prevention, Develop / Improve Independent Living Skills, and Develop Socialization / Interpersonal Relationship Skills.    Whit reports feeling tired. She reports less depressed and less anxious mood. Denies S/I, SIB or safety issues. She is using coping skills for symptom management. \"I feel strong\".  Her affirmation is \"I'm not too much.\" Whit is motivated in treatment and healing depsite that it is a lot of work. She is engaged in movement aeb  went for a walk this morning. She will attend a [employer] yoga class today.  She is grateful that her dog did not push her on the leash. She appreciates the support of the group.         2025    11:00 AM   Suicide Ideation Check In   Since last session, how often have you had suicidal thoughts? No thoughts of suicide       Therapeutic Interventions/Treatment Strategies:  Psychotherapist offered " support, feedback and validation and reinforced use of skills. Treatment modalities used include Cognitive Behavioral Therapy.    Assessment:    Patient response:   Patient responded to session by verbalizing understanding    Possible barriers to participation / learning include: and no barriers identified    Health Issues:   None reported       Substance Use Review:   Substance Use: No active concerns identified.    Mental Status/Behavioral Observations  Appearance:   Appropriate   Eye Contact:   Good   Psychomotor Behavior: Normal   Attitude:   Cooperative  Interested  Orientation:   All  Speech   Rate / Production: Normal    Volume:  Normal   Mood:    Less depressed and less anxious mood  Affect:    Appropriate   Thought Content:   Clear and Safety denies any current safety concerns including suicidal ideation, self-harm, and homicidal ideation  Thought Form:  Coherent  Goal Directed  Logical     Insight:    Good     Plan:   Safety Plan: No current safety concerns identified.  Recommended that patient call 911 or go to the local ED should there be a change in any of these risk factors.   Barriers to treatment: None identified  Patient Contracts (see media tab):  None  Substance Use: Not addressed in session   Continue or Discharge: Patient will continue in Adult Day Treatment (ADT)  as planned. Patient is likely to benefit from learning and using skills as they work toward the goals identified in their treatment plan.      Tiffany Tate, [facility]  April 17, 2025

## 2025-04-17 NOTE — GROUP NOTE
Psychoeducation Group Note    PATIENT'S NAME: Preethi Mays  MRN:   [id number]  :   1960  ACCT. NUMBER: [id number]  DATE OF SERVICE: 25  START TIME:  9:00 AM  END TIME:  9:50 AM  FACILITATOR: Janie Saucedo LGSW  TOPIC: MH Life Skills Group: Sensory Approaches in Mental Health  [facility] Outpatient Programs  TRACK: IOP/DT 4    NUMBER OF PARTICIPANTS: 5    Summary of Group / Topics Discussed:  Sensory Approaches in Mental Health: Coping Through the Senses: Patients were introduced and taught about neurosensory based skills and strategies related to supporting effective self-regulation skills.  Patients were taught about the eight sensory systems and how they can be used for coping with mental health symptoms and stressors.  Patients were provided with an experiential opportunity to increase self-awareness of helpful sensory input and self-care activities. Patients were introduced how to create supportive environments that encourage use of these skills.          Patient Session Goals / Objectives:  Identified specific and individualized neurosensory skills to help when distressed.     Identified skills learned and how this applies to current daily life.   Established a plan for practice of these skills in their own environments.       Patient Participation / Response:  Fully participated with the group by sharing personal reflections / insights and openly received / provided feedback with other participants.    Demonstrated understanding of content through participation in the provided activity and Verbalized understanding of content    Treatment Plan:  Patient has a current master individualized treatment plan.  See Epic treatment plan for more information.    YOUSIF Ortiz

## 2025-04-17 NOTE — GROUP NOTE
Psychotherapy Group Note    PATIENT'S NAME: Preethi Mays  MRN:   [id number]  :   1960  ACCT. NUMBER: [id number]  DATE OF SERVICE: 25  START TIME: 11:00 AM  END TIME: 11:50 AM  FACILITATOR: Eugene Vallejo LMFT  TOPIC: MH EBP Group: Behavioral Activation  [facility] Mental [facility] Outpatient Programs  TRACK: IOPDT4    NUMBER OF PARTICIPANTS: 6    Summary of Group / Topics Discussed:  Behavioral Activation: Motivation and Procrastination: Patients explored how they currently spend their time, identifying thoughts and feelings that are motivating and serve to increase desired behaviors.  They also examined behaviors that contribute to procrastination.  Different types of procrastination behaviors were identified, and strategies to reduce individual procrastination and increase motivation were explored and practiced.  Patients identified ways to increase goal-directed activities to enhance mood and reduce symptoms.        Patient Session Goals / Objectives:  Identify current patterns of procrastination behavior and how they influence thoughts and moods, and inhibit motivation.  Identify behaviors that can be implemented that contribute to improving thoughts and feelings, motivation, and reduce symptoms.  Identify and develop a plan to increase activities that promote a sense of accomplishment and competence.  Practice scheduling positive activities / behaviors into daily routines.      Patient Participation / Response:  Fully participated with the group by sharing personal reflections / insights and openly received / provided feedback with other participants.    Demonstrated understanding of topics discussed through group discussion and participation, Expressed understanding of the relationship between behaviors, thoughts, and feelings, Identified / Expressed personal readiness to make behavioral change, and Practiced skills in session    Treatment Plan:  Patient has a current master  individualized treatment plan.  See Epic treatment plan for more information.    AYESHA Cano

## 2025-04-17 NOTE — GROUP NOTE
Psychotherapy Group Note    PATIENT'S NAME: Preethi Mays  MRN:   [id number]  :   1960  ACCT. NUMBER: [id number]  DATE OF SERVICE: 25  START TIME: 12:00 PM  END TIME: 12:50 PM  FACILITATOR: Janie Saucedo LGSW  TOPIC: MH EBP Group: [facility] Mental Health Outpatient Programs  TRACK: IOP/DT 4    NUMBER OF PARTICIPANTS: 6    Summary of Group / Topics Discussed:  Mindfulness: Mindfulness Skills: Patients received information on the main components of mindfulness. Patients participated in discussion on how to practice observing, describing, and participating in internal and external environments. Relevance of mindfulness skills to overall mental and physical health was explored.  Patients explored and discussed in group their current awareness and knowledge of mindfulness skills as well as barriers to applying skills.    Patient Session Goals / Objectives:  Demonstrated and verbalized understanding of key mindfulness concepts  Identified when/how to use mindfulness skills  Resolved barriers to practicing mindfulness skills  Identified plan to use mindfulness skills in daily life       Patient Participation / Response:  Fully participated with the group by sharing personal reflections / insights and openly received / provided feedback with other participants.    Demonstrated understanding of topics discussed through group discussion and participation, Demonstrated understanding of mindfulness skills and benefits of practice, and Identified / Expressed personal readiness to practice mindfulness skills    Treatment Plan:  Patient has a current master individualized treatment plan.  See Epic treatment plan for more information.    YOUSIF Ortiz

## 2025-04-21 NOTE — PROGRESS NOTES
Intensive Outpatient Program    Physician Recertification of Medical Necessity    Patient Legal Name: Preethi Mays   Patient Preferred Name: Whit  Patient : 1960  Patient MRN: [id number]    Attending physician: Cal Sanchez MD    Recertification #1 from date 2025 through date 2025    I certify the above-named patient would require partial hospitalization care if intensive outpatient services were not provided and that the patient requires such IOP services for a minimum of 9 hours per week. These services are provided under the care and supervision of a physician and under a written, individualized plan of treatment authorized and approved by the physician.    Patient's response to the therapeutic interventions provided by IOP:  Improved symptoms and coping      Patient's psychiatric symptoms that continue to place the patient at risk of need for higher level of care:  Continued symptoms, including rumination      Treatment Goals for coordination of services to facilitate discharge from the intensive outpatient program:    Goal # 1: continue curriculum of program    Goal # 2: finalize treatment planning    Goal # 3: continue to decrease symptoms       Cal Sanchez MD on 2025 at 9:34 AM

## 2025-04-22 ENCOUNTER — HOSPITAL ENCOUNTER (OUTPATIENT)
Dept: BEHAVIORAL HEALTH | Facility: CLINIC | Age: 65
End: 2025-04-22
Attending: PSYCHIATRY & NEUROLOGY
Payer: COMMERCIAL

## 2025-04-22 PROCEDURE — 90853 GROUP PSYCHOTHERAPY: CPT

## 2025-04-22 PROCEDURE — 90853 GROUP PSYCHOTHERAPY: CPT | Performed by: PSYCHOLOGIST

## 2025-04-22 PROCEDURE — 90853 GROUP PSYCHOTHERAPY: CPT | Performed by: SOCIAL WORKER

## 2025-04-22 NOTE — GROUP NOTE
Psychotherapy Group Note    PATIENT'S NAME: Preethi Mays  MRN:   [id number]  :   1960  ACCT. NUMBER: [id number]  DATE OF SERVICE: 25  START TIME: 11:00 AM  END TIME: 11:50 AM  FACILITATOR: Estefany Hernandez Psy.D, LP  TOPIC: MH EBP Group: Cognitive Restructuring  [facility] Mental Health Outpatient Programs  TRACK: iop/dt4 trauma    NUMBER OF PARTICIPANTS: 6    Summary of Group / Topics Discussed:  Cognitive Restructuring: Distortions: Patients received an overview of how to identify common cognitive distortions. Patients will explore alternatives to cognitive distortions and practice challenging their negative thought patterns. The goal is to help patients target modify ineffective thought patterns.     Patient Session Goals / Objectives:  Familiarized self with ineffective / unhealthy thoughts and how they develop.    Explored impact of ineffective thoughts / distortions on mood and activity  Formulated new neutral/positive alternatives to challenge less helpful / ineffective thoughts.  Practiced and plan to apply in daily life             Patient Participation / Response:  Fully participated with the group by sharing personal reflections / insights and openly received / provided feedback with other participants.    Expressed understanding of the relationship between behaviors, thoughts, and feelings    Treatment Plan:  Patient has a current master individualized treatment plan.  See Epic treatment plan for more information.    Estefany Hernandez Psy.D, PENELOPE

## 2025-04-22 NOTE — GROUP NOTE
Psychotherapy Group Note    PATIENT'S NAME: Preethi Mays  MRN:   [id number]  :   1960  ACCT. NUMBER: [id number]  DATE OF SERVICE: 25  START TIME: 12:00 PM  END TIME: 12:50 PM  FACILITATOR: Verenice Spencer LICSW  TOPIC: MH EBP Group: Specialty Awareness  [facility] Adult Mental Health Outpatient Programs  TRACK: IOP/DT 4 T    NUMBER OF PARTICIPANTS: 6    Summary of Group / Topics Discussed:  Specialty Topics: Life Transitions: The topic of life transitions was presented in order to help patients to better understand the challenges presented by life transitions, and how to best navigate them. Exploring the phases of transition and how one works through them was discussed. Patients were provided with information regarding community resources.     Patient Session Goals / Objectives:  Discussed the timing and nature of major life transitions  Explored how life transitions may impact mental health and functioning  Discussed coping strategies to manage symptoms and help with transitioning  Discussed and planned a successful transition      Patient Participation / Response:  Fully participated with the group by sharing personal reflections / insights and openly received / provided feedback with other participants.    Demonstrated understanding of topics discussed through group discussion and participation, Identified / Expressed readiness to act on skill suggestions discussed in topic, Verbalized understanding of ways to proactively manage illness, and Identified plan to address barriers to practicing skills discussed in topic    Treatment Plan:  Patient has a current master individualized treatment plan.  See Epic treatment plan for more information.    KVNG Jj

## 2025-04-22 NOTE — GROUP NOTE
"Process Group Note    PATIENT'S NAME: Preethi Mays  MRN:   [id number]  :   1960  ACCT. NUMBER: [id number]  DATE OF SERVICE: 25  START TIME: 10:00 AM  END TIME: 10:50 AM  FACILITATOR: Tiffany Tate [facility]  TOPIC:  Process Group    Diagnoses:  296.32 (F33.1) Major Depressive Disorder, Recurrent Episode, Moderate _ and With anxious distress.  3. Other Diagnoses that is relevant to services:   300.00 (F41.9) Unspecified Anxiety Disorder  309.81 (F43.10) Posttraumatic Stress Disorder (includes Posttraumatic Stress Disorder for Children 6 Years and Younger)  With dissociative symptoms.    [facility] Adult Mental Health Outpatient Programs  TRACK: IOP/DT 4    NUMBER OF PARTICIPANTS: 6        Data:    Session content: At the start of this group, patients were invited to check in by identifying themselves, describing their current emotional status, and identifying issues to address in this group.     Area(s) of treatment focus addressed in this session included Symptom Management, Personal Safety, Community Resources/Discharge Planning, Abstinence/Relapse Prevention, Develop / Improve Independent Living Skills, and Develop Socialization / Interpersonal Relationship Skills.    Whit reports anxious and depressed mood. Denies active S/I. Denies SIB or H/I. She feels worn out. She reports using coping skills for symptom management including opposite to emotion. Whit reports being aware of physical discomfort with her knee.   Her affirmation is \"Im not too much.\"  She reports medication compliance. Her goal is to go shopping for her daughter today.         2025     2:00 PM   Suicide Ideation Check In   Since last session, how often have you had suicidal thoughts? No thoughts of suicide, but wish I were dead       Therapeutic Interventions/Treatment Strategies:  Psychotherapist offered support, feedback and validation and reinforced use of skills. Treatment modalities used include Cognitive " Behavioral Therapy.    Assessment:    Patient response:   Patient responded to session by verbalizing understanding    Possible barriers to participation / learning include: and no barriers identified    Health Issues:   Yes: Issues with her knee.       Substance Use Review:   Substance Use: No active concerns identified.    Mental Status/Behavioral Observations  Appearance:   Appropriate   Eye Contact:   Good   Psychomotor Behavior: Normal   Attitude:   Cooperative  Interested  Orientation:   All  Speech   Rate / Production: Normal    Volume:  Normal   Mood:    Anxious  Depressed   Affect:    Appropriate  Worrisome   Thought Content:   Clear and Safety denies any current safety concerns including suicidal ideation, self-harm, and homicidal ideation  Thought Form:  Coherent  Goal Directed  Logical     Insight:    Good     Plan:   Safety Plan: No current safety concerns identified.  Recommended that patient call 911 or go to the local ED should there be a change in any of these risk factors.   Barriers to treatment: None identified  Patient Contracts (see media tab):  None  Substance Use: Not addressed in session   Continue or Discharge: Patient will continue in Adult Day Treatment (ADT)  as planned. Patient is likely to benefit from learning and using skills as they work toward the goals identified in their treatment plan.      Tiffany Tate, [facility]  April 22, 2025

## 2025-04-22 NOTE — GROUP NOTE
Psychoeducation Group Note    PATIENT'S NAME: Preethi Mays  MRN:   [id number]  :   1960  ACCT. NUMBER: [id number]  DATE OF SERVICE: 25  START TIME:  9:00 AM  END TIME:  9:50 AM  FACILITATOR: Indra Mercer LPC; Cate Levy OTR  TOPIC:  Life Skills Group: Life Skills  [facility] Adult Mental Health Outpatient Programs  TRACK: IOP/DT-4 TRAUMA  NUMBER OF PARTICIPANTS: 6    Life Skills Clinic Overview: The Life Skills Clinic offers patients the opportunity to independently select and engage in therapeutic activities designed to support their recovery goals and psychiatric well-being. During this session, the focus was on behavioral activation, specifically acting opposite to emotion to enhance motivation for daily tasks. The clinic serves as a platform for patients to monitor their symptoms, increase self-awareness, and practice essential skills such as self-regulation, mindfulness, self-talk, focus, concentration, social interaction, and productivity. In addition to skill-building, the clinic helps patients develop a sense of self-efficacy and mastery, while also providing validation, support, and resources. Staff members actively engage by checking in with participants, observing their performance, and assessing their skill use within the context of group dynamics. This approach allows for ongoing monitoring of behavioral patterns and performance skills.    Patient Session Goals and Objectives:  Independently identify a purposeful and meaningful therapeutic activity.  Select specific goal(s) to focus on during the session.  Reflect on performance, providing insights regarding progress.  Practice and identify strategies to generalize learned skills to daily life activities.      Patient Participation / Response:  Fully participated with the group by sharing personal reflections / insights and openly received / provided feedback with other participants.    Verbalized understanding  of content, Patient would benefit from additional opportunities to practice the content to be able to generalize it to their everyday life with increased intentionality, consistency, and efficacy in support of their psychiatric recovery, and Patient worked towards initial treatment plan goals     Treatment Plan:  Patient has a current master individualized treatment plan.  See Epic treatment plan for more information.    Indra Mercer, LPC

## 2025-04-23 ENCOUNTER — HOSPITAL ENCOUNTER (OUTPATIENT)
Dept: BEHAVIORAL HEALTH | Facility: CLINIC | Age: 65
End: 2025-04-23
Attending: PSYCHIATRY & NEUROLOGY
Payer: COMMERCIAL

## 2025-04-23 PROCEDURE — 90853 GROUP PSYCHOTHERAPY: CPT

## 2025-04-23 PROCEDURE — 90853 GROUP PSYCHOTHERAPY: CPT | Performed by: COUNSELOR

## 2025-04-23 NOTE — GROUP NOTE
Psychoeducation Group Note    PATIENT'S NAME: Preethi Mays  MRN:   [id number]  :   1960  ACCT. NUMBER: [id number]  DATE OF SERVICE: 25  START TIME:  9:00 AM  END TIME:  9:50 AM  FACILITATOR: Kaitlyn Gu LICSW  TOPIC: MH Wellness Group: [facility] Adult Mental Health Outpatient Programs  TRACK: IOP DT 4     NUMBER OF PARTICIPANTS: 6    Summary of Group / Topics Discussed:  Specialty Health:  Specialty Health: Hygiene Indifference    Patient Session Goals / Objectives:  Introduce the topic and help participants understand what hygiene indifference is, how it relates to mental health, and why it is often overlooked  Encourage open conversation about the relationship between mental health and hygiene, and address the stigma that often surrounds it  Review strategies for coping and support          Patient Participation / Response:  Fully participated with the group by sharing personal reflections / insights and openly received / provided feedback with other participants.    Demonstrated understanding of topics discussed through group discussion and participation and Identified / Expressed personal readiness to practice skills    Treatment Plan:  Patient has a current master individualized treatment plan.  See Epic treatment plan for more information.    KVNG Craven

## 2025-04-23 NOTE — GROUP NOTE
Psychoeducation Group Note    PATIENT'S NAME: Preethi Mays  MRN:   [id number]  :   1960  ACCT. NUMBER: [id number]  DATE OF SERVICE: 25  START TIME: 11:00 AM  END TIME: 11:50 AM  FACILITATOR: Eugene Vallejo LMFT  TOPIC: MH Life Skills Group: Lifestyle Balance and Structure  [facility] Mental Health Outpatient Programs  TRACK: IOPDT4    NUMBER OF PARTICIPANTS: 5    Summary of Group / Topics Discussed:  Lifestyle Balance and Strucure:  Building New Habits: Patients were assisted to identify new habits that they want to promote .  Patients learned, applied, and generalized skills needed to develop and maintain new habits into their daily life.     Patient Session Goals / Objectives:  Improved awareness and engagement in new habits  Explored and identified current roles and challenges due to mental health symptoms   Identified ways to establish and integrate daily habits to support mental health recovery  Practiced and reflected on how to generalize taught skills to their everyday life      Patient Participation / Response:  Fully participated with the group by sharing personal reflections / insights and openly received / provided feedback with other participants.    Demonstrated understanding of content through group participation.     Treatment Plan:  Patient has a current master individualized treatment plan.  See Epic treatment plan for more information.    AYESHA Cano

## 2025-04-23 NOTE — GROUP NOTE
Psychotherapy Group Note    PATIENT'S NAME: Preethi Mays  MRN:   [id number]  :   1960  ACCT. NUMBER: [id number]  DATE OF SERVICE: 25  START TIME: 12:00 PM  END TIME: 12:50 PM  FACILITATOR: Kaitlyn Gu LICSW  TOPIC:  EBP Group: Emotions Management  [facility] Mental Health Outpatient Programs  TRACK: IOP DT 4     NUMBER OF PARTICIPANTS: 6    Summary of Group / Topics Discussed:  Emotions Management: Understanding Emotions: Patients discussed the purpose of emotions and function they serve in our lives.  Reviewed core emotions, why they happen (triggers), and how they occur. The group assisted one anothers' understanding that: emotional experiences are important; difficult emotions have a place in our lives; and the differences between various emotions.    Patient Session Goals / Objectives:  Demonstrate understanding of types various emotions  Identify and discuss specific emotions and when they occur; understand triggers  Discuss barriers to emotional regulation      Patient Participation / Response:  Fully participated with the group by sharing personal reflections / insights and openly received / provided feedback with other participants.    Demonstrated understanding of topics discussed through group discussion and participation, Expressed understanding of the relevance / importance of emotions management skills at distressing times in life, Self-aware of experiences with difficult emotions, and strategies to employ to manage them, and Identified strategies to overcome barriers to use of emotions management skills    Treatment Plan:  Patient has a current master individualized treatment plan.  See Epic treatment plan for more information.    KVNG Craven

## 2025-04-23 NOTE — GROUP NOTE
Process Group Note    PATIENT'S NAME: Preethi Mays  MRN:   [id number]  :   1960  ACCT. NUMBER: [id number]  DATE OF SERVICE: 25  START TIME: 10:00 AM  END TIME: 10:50 AM  FACILITATOR: Kaitlyn Gu LICSW  TOPIC:  Process Group    Diagnoses:    296.32 (F33.1) Major Depressive Disorder, Recurrent Episode, Moderate _ and With anxious distress.  3. Other Diagnoses that is relevant to services:   300.00 (F41.9) Unspecified Anxiety Disorder  309.81 (F43.10) Posttraumatic Stress Disorder (includes Posttraumatic Stress Disorder for Children 6 Years and Younger)  With dissociative symptoms.    [facility]  TRACK: IOP DT 4     NUMBER OF PARTICIPANTS: 6        Data:    Session content: At the start of this group, patients were invited to check in by identifying themselves, describing their current emotional status, and identifying issues to address in this group.   Area(s) of treatment focus addressed in this session included Symptom Management and Personal Safety.    Patient reported feeling tired. Patient discussed working toward regulation. Patient identified distress tolerance as skills they will use to address their goal(s). Patient reported self may be a barrier to working toward their goal(s) and/or addressing mental health symptoms. Patient reported no safety concerns and/or self-injurious behaviors. Patient reported no substance use. Patient reported they are taking their medications as prescribed. Patient reported feeling proud that they part of group. Patient discussed grounding with the treatment group.              Therapeutic Interventions/Treatment Strategies:  Treatment modalities used include Cognitive Behavioral Therapy and Dialectical Behavioral Therapy.    Assessment:    Patient response:   Patient responded to session by accepting feedback, giving feedback, listening, and focusing on goals    Possible barriers to participation / learning  include: and no barriers identified    Health Issues:   None reported       Substance Use Review:   Substance Use: No active concerns identified.    Mental Status/Behavioral Observations  Appearance:   Appropriate   Eye Contact:   Good   Psychomotor Behavior: Normal   Attitude:   Cooperative   Orientation:   All  Speech   Rate / Production: Normal    Volume:  Normal   Mood:    Normal  Affect:    Appropriate   Thought Content:   Clear  Thought Form:  Coherent  Logical     Insight:    Good     Plan:   Safety Plan: No current safety concerns identified.  Recommended that patient call 911 or go to the local ED should there be a change in any of these risk factors.   Barriers to treatment: None identified  Patient Contracts (see media tab):  None  Substance Use: Not addressed in session   Continue or Discharge: Patient will continue in Adult Day Treatment (ADT)  as planned. Patient is likely to benefit from learning and using skills as they work toward the goals identified in their treatment plan.      KVNG Craven  April 23, 2025

## 2025-04-24 ENCOUNTER — HOSPITAL ENCOUNTER (OUTPATIENT)
Dept: BEHAVIORAL HEALTH | Facility: CLINIC | Age: 65
End: 2025-04-24
Attending: PSYCHIATRY & NEUROLOGY
Payer: COMMERCIAL

## 2025-04-24 DIAGNOSIS — F33.1 MODERATE EPISODE OF RECURRENT MAJOR DEPRESSIVE DISORDER (H): Primary | ICD-10-CM

## 2025-04-24 PROCEDURE — 90853 GROUP PSYCHOTHERAPY: CPT

## 2025-04-24 PROCEDURE — 90853 GROUP PSYCHOTHERAPY: CPT | Performed by: SOCIAL WORKER

## 2025-04-24 NOTE — GROUP NOTE
Psychotherapy Group Note    PATIENT'S NAME: Preethi Mays  MRN:   [id number]  :   1960  ACCT. NUMBER: [id number]  DATE OF SERVICE: 25  START TIME: 12:00 PM  END TIME: 12:50 PM  FACILITATOR: Teagan Jones LPCC; Janie Saucedo LGSW  TOPIC: MH EBP Group: Emotions Management  [facility]  TRACK: IOP/DT4    NUMBER OF PARTICIPANTS: 6    Summary of Group / Topics Discussed:  Emotions Management: Check Facts: Patients participated in an interactive approach to identifying and challenging cognitive distortions that arise following an event that triggers intense emotion.  Patients choose an emotional reaction/event to work on.  The group shared their experiences and thought processes for feedback.      Patient Session Goals / Objectives:  Learn the process of identifying thoughts associated with the situation and reaction  Learn to challenge cognitive distortions and reframe the situation/event/reaction   Distinguish between facts, feelings, thoughts  Gain understanding of how to interpret an emotional reaction  Practice identification of cognitive distortions and evaluating an emotionally charged situation more rationally/objectively/mindfully      Patient Participation / Response:  Fully participated with the group by sharing personal reflections / insights and openly received / provided feedback with other participants.    Demonstrated understanding of topics discussed through group discussion and participation, Self-aware of experiences with difficult emotions, and strategies to employ to manage them, and Identified barriers to applying emotions management strategies    Treatment Plan:  Patient has a current master individualized treatment plan.  See Epic treatment plan for more information.    SARAH Hernandez

## 2025-04-24 NOTE — GROUP NOTE
Psychotherapy Group Note    PATIENT'S NAME: Preethi Mays  MRN:   [id number]  :   1960  ACCT. NUMBER: [id number]  DATE OF SERVICE: 25  START TIME: 11:00 AM  END TIME: 11:50 AM  FACILITATOR: Tiffany Tate LICSW  TOPIC: MH EBP Group: Mindfulness  [facility] Adult Mental Health Outpatient [facility]  TRACK: IOP/DT 4 T    NUMBER OF PARTICIPANTS: 5  Summary of Group / Topics Discussed:  Mindfulness: Mindfulness Experiential: Patients engaged in an insight oriented therapeutic exercise using art media. Pts were educated  how mindfulness can benefit general health, mental health symptoms, and stressors. Pt's created a picture illustrating their  recovery and clinical needs.  Patients discussed current awareness, knowledge, and practice of mindfulness skills. Patients also discussed barriers to the mindfulness practice.    Patient Session Goals / Objectives:  Demonstrated and verbalized understanding of mindfulness concepts  Identified clinical needs in their recovery   Identified plan to use  in mental health recovery and daily life       Patient Participation / Response:  Fully participated with the group by sharing personal reflections / insights and openly received / provided feedback with other participants.    Demonstrated understanding of topics discussed through group discussion and participation    Treatment Plan:  Patient has a current master individualized treatment plan.  See Epic treatment plan for more information.    KVNG Moralez

## 2025-04-24 NOTE — GROUP NOTE
"Process Group Note    PATIENT'S NAME: Preethi Mays  MRN:   [id number]  :   1960  ACCT. NUMBER: [id number]  DATE OF SERVICE: 25  START TIME: 10:00 AM  END TIME: 10:50 AM  FACILITATOR: Verenice Spencer LICSW  TOPIC:  Process Group    Diagnoses:    296.32 (F33.1) Major Depressive Disorder, Recurrent Episode, Moderate _ and With anxious distress.  3. Other Diagnoses that is relevant to services:   300.00 (F41.9) Unspecified Anxiety Disorder  309.81 (F43.10) Posttraumatic Stress Disorder (includes Posttraumatic Stress Disorder for Children 6 Years and Younger)  With dissociative symptoms.    [facility] Adult Mental Health Outpatient Programs  TRACK: IOP/DT 4 T    NUMBER OF PARTICIPANTS: 6        Data:    Session content: At the start of this group, patients were invited to check in by identifying themselves, describing their current emotional status, and identifying issues to address in this group.   Area(s) of treatment focus addressed in this session included Symptom Management and Personal Safety.    Reported mood is \"Upset, unhinged, unhappy, unsettled, undone\".  She reports her startle reflex has returned with increased intensity today.   Client denied safety concerns, including SI and SIB (\"surprisingly none\"). Client denies any chemical use.  Client is taking medications as prescribed. Client's goal is \"survive my father\".  Client reported plans to use the following coping skills: accept help from  and daughter. Client is proud of herself for managing interpersonal triggers.  Patient requested processing time but declined time to process with the psychotherapist that was available next hour.        2025     2:00 PM   Suicide Ideation Check In   Since last session, how often have you had suicidal thoughts? No thoughts of suicide                      Therapeutic Interventions/Treatment Strategies:  Treatment modalities used include Cognitive Behavioral Therapy and Dialectical " "Behavioral Therapy.    Assessment:    Patient response:   Patient responded to session by accepting feedback, giving feedback, listening, and focusing on goals    Possible barriers to participation / learning include: and no barriers identified    Health Issues:   None reported       Substance Use Review:   Substance Use: No active concerns identified.    Mental Status/Behavioral Observations  Appearance:   Appropriate   Eye Contact:   Good   Psychomotor Behavior: Normal   Attitude:   Cooperative   Orientation:   All  Speech   Rate / Production: Normal    Volume:  Normal   Mood:    \"Upset, unhinged, unhappy, unsettled, undone\"  Affect:    Worrisome   Thought Content:   Clear  Thought Form:  Coherent  Logical     Insight:    Good     Plan:   Safety Plan: No current safety concerns identified.  Recommended that patient call 911 or go to the local ED should there be a change in any of these risk factors.   Barriers to treatment: None identified  Patient Contracts (see media tab):  None  Substance Use: Not addressed in session   Continue or Discharge: Patient will continue in Adult Day Treatment (ADT)  as planned. Patient is likely to benefit from learning and using skills as they work toward the goals identified in their treatment plan.      Verenice Spencer [facility]  April 24, 2025  "

## 2025-04-24 NOTE — GROUP NOTE
Psychoeducation Group Note    PATIENT'S NAME: Preethi Mays  MRN:   [id number]  :   1960  ACCT. NUMBER: [id number]  DATE OF SERVICE: 25  START TIME:  9:00 AM  END TIME:  9:50 AM  FACILITATOR: Indra Mercer LPC; Cate Levy OTR  TOPIC: MH Life Skills Group: Sensory Approaches in Mental Health  [facility] Adult Mental Health Outpatient Programs  TRACK: [facility]/DT-4    NUMBER OF PARTICIPANTS: 7    Summary of Group and Topics Discussed:   Sensory Approaches in Mental Health: Coping Through the Senses  Patients were introduced to neurosensory-based strategies designed to support effective self-regulation. The session focused on educating patients about the eight sensory systems and how these systems can be engaged to manage mental health symptoms and stress. Through experiential activities, patients were encouraged to increase their self-awareness around beneficial sensory input and self-care practices. Additionally, they were guided in creating supportive environments that promote the use of these sensory strategies.    Patient Session Goals and Objectives:  Identified individualized neurosensory strategies for managing distress.  Recognized how these skills apply to their current daily routines and mental health needs.  Developed a personalized plan to practice and integrate these skills into their own environments.      Patient Participation / Response:  Fully participated with the group by sharing personal reflections / insights and openly received / provided feedback with other participants.    Verbalized understanding of content, Patient would benefit from additional opportunities to practice the content to be able to generalize it to their everyday life with increased intentionality, consistency, and efficacy in support of their psychiatric recovery, and Patient worked towards initial treatment plan goals     Treatment Plan:  Patient has a current master individualized treatment  plan.  See Epic treatment plan for more information.    Indra Mercer, LPC

## 2025-04-29 ENCOUNTER — HOSPITAL ENCOUNTER (OUTPATIENT)
Dept: BEHAVIORAL HEALTH | Facility: CLINIC | Age: 65
End: 2025-04-29
Attending: PSYCHIATRY & NEUROLOGY
Payer: COMMERCIAL

## 2025-04-29 PROCEDURE — 90853 GROUP PSYCHOTHERAPY: CPT | Performed by: SOCIAL WORKER

## 2025-04-29 PROCEDURE — 90853 GROUP PSYCHOTHERAPY: CPT

## 2025-04-29 NOTE — GROUP NOTE
Psychotherapy Group Note    PATIENT'S NAME: Preethi Mays  MRN:   [id number]  :   1960  ACCT. NUMBER: [id number]  DATE OF SERVICE: 25  START TIME: 12:00 PM  END TIME: 12:50 PM  FACILITATOR: Preethi Stone LICSW  TOPIC: MH EBP Group: Specialty Awareness  [facility] Adult Mental Health Outpatient Programs  TRACK: iop/dt4    NUMBER OF PARTICIPANTS: 7    Summary of Group / Topics Discussed:  Specialty Topics: Goal Setting: Provided education and assessment on patient's group programming goals. Evaluated patient's assessment of their ability to participate in groups, share emotions with group members, and openness to the group format. Provided education regarding appropriate individual-based group therapy goals.     Patient Session Goals and Objectives:  -Participate in reflective assessment of group readiness.  -Understand appropriate topics and methods to discuss those topics in group programming.  -Identify and problem solve barriers to group participation.  -Identify strategies to actively cope while engaging in group programming.   -Reflected up individualized treatment plans  -Meet with members of the treatment team to assess goal progress       Patient Participation / Response:  Fully participated with the group by sharing personal reflections / insights and openly received / provided feedback with other participants.    Demonstrated understanding of topics discussed through group discussion and participation and Identified / Expressed readiness to act on skill suggestions discussed in topic    Treatment Plan:  Patient has a current master individualized treatment plan.  See Epic treatment plan for more information.    KVNG Shields

## 2025-04-29 NOTE — GROUP NOTE
Psychoeducation Group Note    PATIENT'S NAME: Preethi Mays  MRN:   [id number]  :   1960  ACCT. NUMBER: [id number]  DATE OF SERVICE: 25  START TIME:  9:00 AM  END TIME:  9:50 AM  FACILITATOR: Indra Mercer LPC; Cate Levy OTR  TOPIC:  Life Skills Group: Life Skills  [facility] Adult Mental Health Outpatient Programs  TRACK: IOP/DT-4 TRAUMA  NUMBER OF PARTICIPANTS: 6    Group Topic and Topics Discussed:  Life Skills: Occupational Therapy Life Skills [facility]:   Provided opportunity for patients to independently choose and engage in a therapeutic activity that supports progress towards their goals and psychiatric recovery.   Discussed the skill of behavioral activation and acting opposite to emotion to improve motivation in everyday life tasks. The Life Skills Clinic provides a context for patients to monitor their symptoms, gain self-awareness, practice skills (self-regulation, mindfulness, self-talk, focus/concentration, social, productivity), build a sense of self efficacy and mastery, as well as receive validation, support, and resources. Staff checks in, observes, assesses, and monitors performance skills and patterns in context with each group member.      Patient Session Goals and Session Objectives:  Independently identify a purposeful and meaningful therapeutic activity.   Identified which goal(s) they are intentionally working on during session.    Reflected on their performance and share insight about their progress.   Practiced and identified a way to generalize a skill to their everyday life.      Patient Participation / Response:  Fully participated with the group by sharing personal reflections / insights and openly received / provided feedback with other participants.    Verbalized understanding of content, Patient would benefit from additional opportunities to practice the content to be able to generalize it to their everyday life with increased intentionality,  consistency, and efficacy in support of their psychiatric recovery, and Patient worked towards initial treatment plan goals     Treatment Plan:  Patient has a current master individualized treatment plan.  See Epic treatment plan for more information.    Indra Mercer, LPC

## 2025-04-29 NOTE — GROUP NOTE
Psychotherapy Group Note    PATIENT'S NAME: Preethi Mays  MRN:   [id number]  :   1960  ACCT. NUMBER: [id number]  DATE OF SERVICE: 25  START TIME: 11:00 AM  END TIME: 11:50 AM  FACILITATOR: Preethi Stone LICSW  TOPIC:  EBP Group: Emotions Management  [facility] Mental [facility] Outpatient Programs  TRACK: iop/dt4    NUMBER OF PARTICIPANTS: 7    Summary of Group / Topics Discussed:  Emotions Management: Anger: Patients explored and shared personal experiences associated with feelings of anger.  Group explored how these feelings develop, what they mean to each individual, and how to increase acceptance and usefulness of these feelings.  Discussed anger as a  secondary  emotion and reviewed ways to manage anger and challenge associated cognitive distortions. Group members worked to contextualize these concepts and promote healing.     Patient Session Goals / Objectives:  Discuss and review definitions and personal views/experiences with anger  Explore how feelings of anger impact functioning  Understand and practice strategies to manage difficult emotions and move towards healing  Demonstrate understanding of the feelings of anger  Verbalize how these emotions have impacted their lives/functioning  Verbalize of knowledge gained and possible interventions to manage feelings      Patient Participation / Response:  Fully participated with the group by sharing personal reflections / insights and openly received / provided feedback with other participants.    Demonstrated understanding of topics discussed through group discussion and participation and Expressed understanding of the relevance / importance of emotions management skills at distressing times in life    Treatment Plan:  Patient has a current master individualized treatment plan.  See Epic treatment plan for more information.    KVNG Shields

## 2025-04-29 NOTE — GROUP NOTE
"Process Group Note    PATIENT'S NAME: Preethi Mays  MRN:   [id number]  :   1960  ACCT. NUMBER: [id number]  DATE OF SERVICE: 25  START TIME: 10:00 AM  END TIME: 10:50 AM  FACILITATOR: Preethi Stone LICSW  TOPIC:  Process Group    Diagnoses:  296.32 (F33.1) Major Depressive Disorder, Recurrent Episode, Moderate _ and With anxious distress.  3. Other Diagnoses that is relevant to services:   300.00 (F41.9) Unspecified Anxiety Disorder  309.81 (F43.10) Posttraumatic Stress Disorder (includes Posttraumatic Stress Disorder for Children 6 Years and Younger)  With dissociative symptoms.    [facility] Adult Mental Health Outpatient Programs  TRACK: iop/dt4    NUMBER OF PARTICIPANTS: 7        Data:    Session content: At the start of this group, patients were invited to check in by identifying themselves, describing their current emotional status, and identifying issues to address in this group.   Area(s) of treatment focus addressed in this session included Symptom Management and Personal Safety.  Whit reports feeling \"moderately\" anxious today.  Working on goals, specifically around grounding.  Shares that \"life is weird when you're in your body\".  Takes time in group to share that she was assertive with individual therapist and will do repair work with her at same time exploring other options.  States that she has not had any other therapist    Therapeutic Interventions/Treatment Strategies:  Psychotherapist offered support, feedback and validation and reinforced use of skills. Treatment modalities used include Cognitive Behavioral Therapy and Dialectical Behavioral Therapy. Interventions include Relationship Skills: Assisted patients in implementing more effective communication skills in their relationships and Discussed strategies to promote healthier understanding of interpersonal relationships.    Assessment:    Patient response:   Patient responded to session by accepting feedback, giving " feedback, listening, focusing on goals, being attentive, accepting support, appearing alert, demonstrating behavior change, and verbalizing understanding    Possible barriers to participation / learning include: and no barriers identified    Health Issues:   None reported       Substance Use Review:   Substance Use: No active concerns identified.    Mental Status/Behavioral Observations  Appearance:   Appropriate   Eye Contact:   Good   Psychomotor Behavior: Normal   Attitude:   Cooperative   Orientation:   All  Speech   Rate / Production: Normal    Volume:  Normal   Mood:    Anxious   Affect:    Appropriate   Thought Content:   Clear  Thought Form:  Coherent  Goal Directed  Logical     Insight:    Good     Plan:   Safety Plan: No current safety concerns identified.  Recommended that patient call 911 or go to the local ED should there be a change in any of these risk factors.   Barriers to treatment: None identified  Patient Contracts (see media tab):  None  Substance Use: Not addressed in session   Continue or Discharge: Patient will continue in Adult Day Treatment (ADT)  as planned. Patient is likely to benefit from learning and using skills as they work toward the goals identified in their treatment plan.      Preethi Stone, [facility]  April 29, 2025

## 2025-04-29 NOTE — GROUP NOTE
"Process Group Note    PATIENT'S NAME: Preethi Mays  MRN:   [id number]  :   1960  ACCT. NUMBER: [id number]  DATE OF SERVICE: 25  START TIME: 10:00 AM  END TIME: 10:50 AM  FACILITATOR: Indra Mercer LPC  TOPIC:  Process Group    Diagnoses:  ***    [facility] Mental Health Outpatient Programs  TRACK: IOP/DT-1 YOUNG ADULT  NUMBER OF PARTICIPANTS: 7      Data:    Session content: At the start of this group, patients were invited to check in by identifying themselves, describing their current emotional status, and identifying issues to address in this group.   Area(s) of treatment focus addressed in this session included {OP BEH ADULT  AREA OF TREATMENT FOCUS:707681}.  ***    Therapeutic Interventions/Treatment Strategies:  {OP  THERAPEUTIC INTERVENTIONS/TREATMENT:893923}    Assessment:    Patient response:   Patient responded to session by {PATIENT RESPONSE:657841}    Possible barriers to participation / learning include: {POSSIBLE BARRIERS:546584}    Health Issues:   {YES / NO:931992}       Substance Use Review:   Substance Use: {YES / NO:948798}    Mental Status/Behavioral Observations  Appearance:   {Appearance:152529::\"Appropriate \"}  Eye Contact:   {Eye Contact:591606::\"Good \"}  Psychomotor Behavior: {Psychomotor Behavior:489268::\"Normal \"}  Attitude:   {Attitude:290276::\"Cooperative \"}  Orientation:   {Orientation:522826::\"All\"}  Speech   Rate / Production: {Speech Rate/Production:129487::\"Normal \"}   Volume:  {Speech Volume:589062::\"Normal \"}  Mood:    {Mood:100169::\"Normal\"}  Affect:    {Affect:469561::\"Appropriate \"}  Thought Content:   {Thought Content with Safety:167669}  Thought Form:  {Thought Form:746237::\"Coherent \",\"Logical \"}    Insight:    {Insight:968266::\"Good \"}    Plan:     Safety Plan: {SAFETY PLAN:027633}     Barriers to treatment: {Barriers to Treatment:775744}    Patient Contracts (see media tab):  {Patient Contracts:683468}    Substance Use: " {SUBSTANCE USE ASSESSMENT/INTERVENTION:508715}     Continue or Discharge: {Continue or Discharge:994122}      Indra Mercer, [facility]  April 29, 2025

## 2025-04-30 ENCOUNTER — HOSPITAL ENCOUNTER (OUTPATIENT)
Dept: BEHAVIORAL HEALTH | Facility: CLINIC | Age: 65
End: 2025-04-30
Attending: PSYCHIATRY & NEUROLOGY
Payer: COMMERCIAL

## 2025-04-30 PROCEDURE — 90853 GROUP PSYCHOTHERAPY: CPT

## 2025-04-30 PROCEDURE — 90853 GROUP PSYCHOTHERAPY: CPT | Performed by: SOCIAL WORKER

## 2025-04-30 NOTE — GROUP NOTE
Psychotherapy Group Note    PATIENT'S NAME: Preethi Mays  MRN:   [id number]  :   1960  ACCT. NUMBER: [id number]  DATE OF SERVICE: 25  START TIME: 12:00 PM  END TIME: 12:50 PM  FACILITATOR: Preethi Stone LICSW  TOPIC:  EBP Group: Emotions Management  [facility]  TRACK: iop/dt4    NUMBER OF PARTICIPANTS: 7    Summary of Group / Topics Discussed:  Emotions Management: Anger: Patients explored and shared personal experiences associated with feelings of anger.  Group explored how these feelings develop, what they mean to each individual, and how to increase acceptance and usefulness of these feelings.  Discussed anger as a  secondary  emotion and reviewed ways to manage anger and challenge associated cognitive distortions. Group members worked to contextualize these concepts and promote healing.     Patient Session Goals / Objectives:  Discuss and review definitions and personal views/experiences with anger  Explore how feelings of anger impact functioning  Understand and practice strategies to manage difficult emotions and move towards healing  Demonstrate understanding of the feelings of anger  Verbalize how these emotions have impacted their lives/functioning  Verbalize of knowledge gained and possible interventions to manage feelings      Patient Participation / Response:  Fully participated with the group by sharing personal reflections / insights and openly received / provided feedback with other participants.    Demonstrated understanding of topics discussed through group discussion and participation, Expressed understanding of the relevance / importance of emotions management skills at distressing times in life, and Self-aware of experiences with difficult emotions, and strategies to employ to manage them    Treatment Plan:  Patient has a current master individualized treatment plan.  See Epic treatment plan for more information.    Preethi  Chase, [facility]

## 2025-04-30 NOTE — GROUP NOTE
Psychotherapy Group Note    PATIENT'S NAME: Preethi Mays  MRN:   [id number]  :   1960  ACCT. NUMBER: [id number]  DATE OF SERVICE: 25  START TIME: 11:00 AM  END TIME: 11:50 AM  FACILITATOR: Preethi Stone LICSW  TOPIC: MH EBP Group: Relationship Skills  [facility]  TRACK: iop/dt4    NUMBER OF PARTICIPANTS: 7    Summary of Group / Topics Discussed:  Relationship Skills: Basic Communication: Patients were provided with a general overview of interpersonal communication skills and information about how communication skills impact symptoms and functioning. The goal of this topic is to help patients identify communication issues and gain skills to work towards healthier interpersonal communication. Patients reviewed their current awareness of communication issues and how communication skills impact relationships and functioning.      Patient Session Goals / Objectives:  Identified and discussed patients individual challenges with basic communication  Presented and practiced effective communication skills in session  Assisted patients in implementing more effective communication skills in their relationships      Patient Participation / Response:  Fully participated with the group by sharing personal reflections / insights and openly received / provided feedback with other participants.    Demonstrated understanding of topics discussed through group discussion and participation and Demonstrated understanding of relationship skills and communication skills    Treatment Plan:  Patient has a current master individualized treatment plan.  See Epic treatment plan for more information.    KVNG Shields

## 2025-04-30 NOTE — GROUP NOTE
"Process Group Note    PATIENT'S NAME: Preethi Mays  MRN:   [id number]  :   1960  ACCT. NUMBER: [id number]  DATE OF SERVICE: 25  START TIME: 10:00 AM  END TIME: 10:50 AM  FACILITATOR: Preethi Stone LICSW  TOPIC:  Process Group    Diagnoses:  296.32 (F33.1) Major Depressive Disorder, Recurrent Episode, Moderate _ and With anxious distress.  3. Other Diagnoses that is relevant to services:   300.00 (F41.9) Unspecified Anxiety Disorder  309.81 (F43.10) Posttraumatic Stress Disorder (includes Posttraumatic Stress Disorder for Children 6 Years and Younger)  With dissociative symptoms.    [facility]  TRACK: iop/dt4    NUMBER OF PARTICIPANTS: 7        Data:    Session content: At the start of this group, patients were invited to check in by identifying themselves, describing their current emotional status, and identifying issues to address in this group.   Area(s) of treatment focus addressed in this session included Symptom Management and Personal Safety.  Whit describes feeling \"shaken like a snowglobe\"  and shares her theory about startle response.  Continuing to explore ways to ground self.  Plans to talk with daughter tonight    Therapeutic Interventions/Treatment Strategies:  Psychotherapist offered support, feedback and validation and reinforced use of skills. Treatment modalities used include Cognitive Behavioral Therapy and Dialectical Behavioral Therapy.    Assessment:    Patient response:   Patient responded to session by accepting feedback, giving feedback, listening, focusing on goals, being attentive, accepting support, appearing alert, demonstrating behavior change, and verbalizing understanding    Possible barriers to participation / learning include: and no barriers identified    Health Issues:   None reported       Substance Use Review:   Substance Use: No active concerns identified.    Mental Status/Behavioral " Observations  Appearance:   Appropriate   Eye Contact:   Good   Psychomotor Behavior: Normal   Attitude:   Cooperative   Orientation:   All  Speech   Rate / Production: Normal    Volume:  Normal   Mood:    Anxious  Normal  Affect:    Appropriate   Thought Content:   Clear  Thought Form:  Coherent  Goal Directed  Logical     Insight:    Good     Plan:   Safety Plan: No current safety concerns identified.  Recommended that patient call 911 or go to the local ED should there be a change in any of these risk factors.   Barriers to treatment: None identified  Patient Contracts (see media tab):  None  Substance Use: Not addressed in session   Continue or Discharge: Patient will continue in Adult Day Treatment (ADT)  as planned. Patient is likely to benefit from learning and using skills as they work toward the goals identified in their treatment plan.      Preethi Stone, [facility]  April 30, 2025

## 2025-04-30 NOTE — GROUP NOTE
Psychoeducation Group Note    PATIENT'S NAME: Preethi Mays  MRN:   [id number]  :   1960  ACCT. NUMBER: [id number]  DATE OF SERVICE: 25  START TIME:  9:00 AM  END TIME:  9:50 AM  FACILITATOR: Yocasta Gusman RN; Kaitlyn Gu LICSW  TOPIC: MH Wellness Group: Mental Health Maintenance  [facility]  TRACK: IOP 2     NUMBER OF PARTICIPANTS: 7    Summary of Group / Topics Discussed:  Mental Health Maintenance:  Social/Coping Bingo: Patients were educated on the importance of balance in meeting our wellness needs. Topic of social/coping was reviewed and patients participated in playing a verbal response style coping/social BINGO game. In this game, patients were challenged to utilize their understanding of themselves and their coping strategies to respond to the questions on their BINGO cards.    Patient Session Goals / Objectives:  Identified the importance of balance in wellness  Explained the important aspects of socialization/effective coping strategies  Listed ways of improving weak areas in social/coping skills      Patient Participation / Response:  Fully participated with the group by sharing personal reflections / insights and openly received / provided feedback with other participants.    Demonstrated understanding of topics discussed through group discussion and participation, Identified / Expressed personal readiness to practice skills, and Verbalized understanding of mental health maintenance topic    Treatment Plan:  Patient has a current master individualized treatment plan.  See Epic treatment plan for more information.    KVNG Craven

## 2025-05-01 ENCOUNTER — HOSPITAL ENCOUNTER (OUTPATIENT)
Dept: BEHAVIORAL HEALTH | Facility: CLINIC | Age: 65
End: 2025-05-01
Attending: PSYCHIATRY & NEUROLOGY
Payer: COMMERCIAL

## 2025-05-01 DIAGNOSIS — F33.1 MODERATE EPISODE OF RECURRENT MAJOR DEPRESSIVE DISORDER (H): Primary | ICD-10-CM

## 2025-05-01 PROCEDURE — 90853 GROUP PSYCHOTHERAPY: CPT | Performed by: SOCIAL WORKER

## 2025-05-01 PROCEDURE — 90853 GROUP PSYCHOTHERAPY: CPT

## 2025-05-01 NOTE — GROUP NOTE
Psychoeducation Group Note    PATIENT'S NAME: Preethi Mays  MRN:   [id number]  :   1960  ACCT. NUMBER: [id number]  DATE OF SERVICE: 25  START TIME:  9:00 AM  END TIME:  9:50 AM  FACILITATOR: Verenice Spencer LICSW  TOPIC: MH PHP OT Group: Self- Regulation Skills  [facility]  TRACK: IOP/DT 4 T    NUMBER OF PARTICIPANTS: 5    Summary of Group / Topics Discussed:  Self-Regulation Skills: Coping Cards: Provided education on the benefits of developing a robust coping plan to help manage distress and regulate emotions.  Discussed the importance of having easy access to this plan in times of increased symptoms.  Brainstormed with group members to identify coping skills in the categories of sensory strategies, distraction techniques, cognitive strategies, and physical activity.  Instructed patients to select 1-2 skills from each category to include in their deck of coping cards, then provided time and materials for patients to create this deck.  Prompted patients to consider where they might keep this tool and when they might find it most helpful.         Patient Session Goals / Objectives:   Review the benefits of having a robust coping plan to help manage distress and regulate emotions   Develop a deck of personal coping cards to promote self-regulation and independent skill use   Established a plan for practice of these skills in their own environments      Patient Participation / Response:  Fully participated with the group by sharing personal reflections / insights and openly received / provided feedback with other participants.    Verbalized understanding of content and Patient would benefit from additional opportunities to practice the content to be able to generalize it to their everyday life with increased intentionality, consistency, and efficacy in support of their psychiatric recovery    Treatment Plan:  Patient has a current master individualized  treatment plan.  See Epic treatment plan for more information.    KVNG Jj

## 2025-05-01 NOTE — GROUP NOTE
Psychotherapy Group Note    PATIENT'S NAME: Preethi Mays  MRN:   [id number]  :   1960  ACCT. NUMBER: [id number]  DATE OF SERVICE: 25  START TIME: 11:00 AM  END TIME: 11:50 AM  FACILITATOR: Preethi Stone LICSW  TOPIC: MH EBP Group: Coping Skills  [facility]  TRACK: iop/dt4    NUMBER OF PARTICIPANTS: 6    Summary of Group / Topics Discussed:  Coping Skills: Radical Acceptance: Patients learned radical acceptance as a way to tolerate heightened stress in the moment.  Patients identified situations that necessitate radical acceptance.  They focused on applying acceptance of the moment to tolerate difficult emotions and events. Patients discussed how to distinguish when this can be useful in their lives and when other skills are more relevant or helpful.    Patient Session Goals / Objectives:  Understand that some amount of pain exists for each of us in our lives  Process the difficulty of acceptance in our lives and benefits of radical acceptance to mood and functioning.  Demonstrate understanding of when to use the radical acceptance to tolerate distress by providing examples of situations where this could be applied.  Identify barriers to applying radical acceptance to difficult situations and explore strategies to overcome them      Patient Participation / Response:  Fully participated with the group by sharing personal reflections / insights and openly received / provided feedback with other participants.    Demonstrated understanding of topics discussed through group discussion and participation, Expressed understanding of the relevance / importance of coping skills at distressing times in life, and Demonstrated knowledge of when to consider using a variety of coping skills in daily life    Treatment Plan:  Patient has a current master individualized treatment plan.  See Epic treatment plan for more information.    KVNG Shields

## 2025-05-01 NOTE — GROUP NOTE
Process Group Note    PATIENT'S NAME: Preethi Mays  MRN:   [id number]  :   1960  ACCT. NUMBER: [id number]  DATE OF SERVICE: 25  START TIME: 10:00 AM  END TIME: 10:50 AM  FACILITATOR: Preethi Stone LICSW  TOPIC:  Process Group    Diagnoses:  296.32 (F33.1) Major Depressive Disorder, Recurrent Episode, Moderate _ and With anxious distress.  3. Other Diagnoses that is relevant to services:   300.00 (F41.9) Unspecified Anxiety Disorder  309.81 (F43.10) Posttraumatic Stress Disorder (includes Posttraumatic Stress Disorder for Children 6 Years and Younger)  With dissociative symptoms.    [facility]  TRACK: iop/dt4    NUMBER OF PARTICIPANTS: 6        Data:    Session content: At the start of this group, patients were invited to check in by identifying themselves, describing their current emotional status, and identifying issues to address in this group.   Area(s) of treatment focus addressed in this session included Symptom Management and Personal Safety.  Whit reports feeling unsafe and shut down today.  Leaves group for sensory room for brief period of time.  Upon return was able to use self soothe/distraction skills to regulate and participate in group    Therapeutic Interventions/Treatment Strategies:  Psychotherapist offered support, feedback and validation and reinforced use of skills. Treatment modalities used include Cognitive Behavioral Therapy and Dialectical Behavioral Therapy.    Assessment:    Patient response:   Patient responded to session by accepting feedback, giving feedback, listening, focusing on goals, being attentive, accepting support, appearing alert, demonstrating behavior change, and verbalizing understanding    Possible barriers to participation / learning include: and no barriers identified    Health Issues:   None reported       Substance Use Review:   Substance Use: No active concerns identified.    Mental Status/Behavioral  Observations  Appearance:   Appropriate   Eye Contact:   Good   Psychomotor Behavior: Normal   Attitude:   Cooperative   Orientation:   All  Speech   Rate / Production: Normal    Volume:  Normal   Mood:    Anxious   Affect:    Tearful  Thought Content:   Clear and Rumination  Thought Form:  Coherent  Logical     Insight:    Good     Plan:   Safety Plan: No current safety concerns identified.  Recommended that patient call 911 or go to the local ED should there be a change in any of these risk factors.   Barriers to treatment: None identified  Patient Contracts (see media tab):  None  Substance Use: Not addressed in session   Continue or Discharge: Patient will continue in Adult Day Treatment (ADT)  as planned. Patient is likely to benefit from learning and using skills as they work toward the goals identified in their treatment plan.      KVNG Shields  May 1, 2025

## 2025-05-06 ENCOUNTER — VIRTUAL VISIT (OUTPATIENT)
Dept: PSYCHIATRY | Facility: CLINIC | Age: 65
End: 2025-05-06
Attending: NURSE PRACTITIONER
Payer: COMMERCIAL

## 2025-05-06 ENCOUNTER — HOSPITAL ENCOUNTER (OUTPATIENT)
Dept: BEHAVIORAL HEALTH | Facility: CLINIC | Age: 65
End: 2025-05-06
Attending: PSYCHIATRY & NEUROLOGY
Payer: COMMERCIAL

## 2025-05-06 DIAGNOSIS — F43.10 PTSD (POST-TRAUMATIC STRESS DISORDER): ICD-10-CM

## 2025-05-06 PROCEDURE — 90853 GROUP PSYCHOTHERAPY: CPT

## 2025-05-06 PROCEDURE — 90853 GROUP PSYCHOTHERAPY: CPT | Performed by: COUNSELOR

## 2025-05-06 PROCEDURE — 90853 GROUP PSYCHOTHERAPY: CPT | Performed by: SOCIAL WORKER

## 2025-05-06 RX ORDER — BUSPIRONE HYDROCHLORIDE 30 MG/1
30 TABLET ORAL 2 TIMES DAILY
Qty: 60 TABLET | Refills: 1 | Status: SHIPPED | OUTPATIENT
Start: 2025-05-06

## 2025-05-06 ASSESSMENT — PATIENT HEALTH QUESTIONNAIRE - PHQ9
SUM OF ALL RESPONSES TO PHQ QUESTIONS 1-9: 1
SUM OF ALL RESPONSES TO PHQ QUESTIONS 1-9: 1
10. IF YOU CHECKED OFF ANY PROBLEMS, HOW DIFFICULT HAVE THESE PROBLEMS MADE IT FOR YOU TO DO YOUR WORK, TAKE CARE OF THINGS AT HOME, OR GET ALONG WITH OTHER PEOPLE: NOT DIFFICULT AT ALL
10. IF YOU CHECKED OFF ANY PROBLEMS, HOW DIFFICULT HAVE THESE PROBLEMS MADE IT FOR YOU TO DO YOUR WORK, TAKE CARE OF THINGS AT HOME, OR GET ALONG WITH OTHER PEOPLE: NOT DIFFICULT AT ALL
SUM OF ALL RESPONSES TO PHQ QUESTIONS 1-9: 1
10. IF YOU CHECKED OFF ANY PROBLEMS, HOW DIFFICULT HAVE THESE PROBLEMS MADE IT FOR YOU TO DO YOUR WORK, TAKE CARE OF THINGS AT HOME, OR GET ALONG WITH OTHER PEOPLE: NOT DIFFICULT AT ALL
SUM OF ALL RESPONSES TO PHQ QUESTIONS 1-9: 1

## 2025-05-06 ASSESSMENT — PAIN SCALES - GENERAL: PAINLEVEL_OUTOF10: NO PAIN (0)

## 2025-05-06 NOTE — GROUP NOTE
"Process Group Note    PATIENT'S NAME: Preethi Mays  MRN:   [id number]  :   1960  ACCT. NUMBER: [id number]  DATE OF SERVICE: 25  START TIME: 10:00 AM  END TIME: 10:50 AM  FACILITATOR: Preehti Stone LICSW  TOPIC:  Process Group    Diagnoses:  296.32 (F33.1) Major Depressive Disorder, Recurrent Episode, Moderate _ and With anxious distress.  3. Other Diagnoses that is relevant to services:   300.00 (F41.9) Unspecified Anxiety Disorder  309.81 (F43.10) Posttraumatic Stress Disorder (includes Posttraumatic Stress Disorder for Children 6 Years and Younger)  With dissociative symptoms.    [facility]  TRACK: iop/dt4    NUMBER OF PARTICIPANTS: 7        Data:    Session content: At the start of this group, patients were invited to check in by identifying themselves, describing their current emotional status, and identifying issues to address in this group.   Area(s) of treatment focus addressed in this session included Symptom Management and Personal Safety.  Whit reports feeling more grounded today and states that \"the buzz\" goes away as she is grounded.  Goals around recognizing core beliefs and impact on life.  Increased awareness around startle response.  Upcoming appt with startle clinic    Therapeutic Interventions/Treatment Strategies:  Psychotherapist offered support, feedback and validation and reinforced use of skills. Treatment modalities used include Cognitive Behavioral Therapy and Dialectical Behavioral Therapy.    Assessment:    Patient response:   Patient responded to session by accepting feedback, giving feedback, listening, focusing on goals, being attentive, accepting support, appearing alert, demonstrating behavior change, and verbalizing understanding    Possible barriers to participation / learning include: and no barriers identified    Health Issues:   None reported       Substance Use Review:   Substance Use: No active concerns " identified.    Mental Status/Behavioral Observations  Appearance:   Appropriate   Eye Contact:   Good   Psychomotor Behavior: Normal   Attitude:   Cooperative   Orientation:   All  Speech   Rate / Production: Normal    Volume:  Normal   Mood:    Anxious  Normal  Affect:    Appropriate   Thought Content:   Clear  Thought Form:  Coherent  Goal Directed  Logical     Insight:    Good     Plan:   Safety Plan: No current safety concerns identified.  Recommended that patient call 911 or go to the local ED should there be a change in any of these risk factors.   Barriers to treatment: None identified  Patient Contracts (see media tab):  None  Substance Use: Not addressed in session   Continue or Discharge: Patient will continue in Adult Day Treatment (ADT)  as planned. Patient is likely to benefit from learning and using skills as they work toward the goals identified in their treatment plan.      Preethi Stone, [facility]  May 6, 2025

## 2025-05-06 NOTE — GROUP NOTE
Psychotherapy Group Note    PATIENT'S NAME: Preethi Mays  MRN:   [id number]  :   1960  ACCT. NUMBER: [id number]  DATE OF SERVICE: 25  START TIME: 12:00 PM  END TIME: 12:50 PM  FACILITATOR: Preethi Stone LICSW  TOPIC:  EBP Group: Specialty Awareness  [facility]  TRACK: iop/dt4    NUMBER OF PARTICIPANTS: 6    Summary of Group / Topics Discussed:  living legacy of trauma  Specialty Topics: Trauma and PTSD: Patients were provided with information to understand the types and origins of trauma, the relationship between trauma and PTSD, the scope of Trauma-Informed Care, and treatment options. Patients were able to explore how trauma may have impacted their functioning directly or indirectly, with reference to the the complexity of trauma and associated treatment options. Patients reviewed their current awareness of this topic and relevance to their functioning. Individual experiences with symptoms and treatment options were discussed.     Patient Session Goals / Objectives:  Discussed definitions of trauma, Trauma-Informed Care, PTSD  Discussed how traumatic experiences affect the individual and their relationships (directly, indirectly, brain development and function)  Identified how a history of trauma or exposure to trauma may impact group work  Assisted patients to find ways to adapt functioning in light of past traumatic experience(s), and to identify suitable treatment options and community resources      Patient Participation / Response:  Fully participated with the group by sharing personal reflections / insights and openly received / provided feedback with other participants.    Demonstrated understanding of topics discussed through group discussion and participation and Identified / Expressed readiness to act on skill suggestions discussed in topic    Treatment Plan:  Patient has a current master individualized treatment plan.  See Epic  treatment plan for more information.    Preethi Stone [facility]

## 2025-05-06 NOTE — NURSING NOTE
Current patient location: [address] [place] [id number]    Is the patient currently in the state of [place]? YES    Visit mode: VIDEO    If the visit is dropped, the patient can be reconnected by:VIDEO VISIT: Text to cell phone:   Telephone Information:   Mobile [phone]       Will anyone else be joining the visit? NO  (If patient encounters technical issues they should call [phone]    Are changes needed to the allergy or medication list? No    Are refills needed on medications prescribed by this physician? Discuss with provider-pt unsure    Rooming Documentation:  [facility]    Reason for visit: RANDAL HICKSF

## 2025-05-06 NOTE — PATIENT INSTRUCTIONS
**For crisis resources, please see the information at the end of this document**   Patient Education    Thank you for coming to the [facility].     Lab Testing:  If you had lab testing today and your results are reassuring or normal they will be mailed to you or sent through [facility] within 7 days. If the lab tests need quick action we will call you with the results. The phone number we will call with results is # [phone]. If this is not the best number please call our clinic and change the number.     Medication Refills:  If you need any refills please call your pharmacy and they will contact us. Our fax number for refills is [phone].   Three business days of notice are needed for general medication refill requests.   Five business days of notice are needed for controlled substance refill requests.   If you need to change to a different pharmacy, please contact the new pharmacy directly. The new pharmacy will help you get your medications transferred.     Contact Us:  Please call [phone] during business hours (8-5:00 M-F).   If you have medication related questions after clinic hours, or on the weekend, please call [phone].     Financial Assistance [phone]   Medical Records [phone]       MENTAL HEALTH CRISIS RESOURCES:  For a emergency help, please call 911 or go to the nearest Emergency Department.     Emergency Walk-In Options:   EmPATH Unit @ [facility] ([place]): [phone] - Specialized mental health emergency area designed to be calming  [facility] ([place]): [phone]  [facility] ([place]): [phone]  [facility]): [phone]    [facility] Crisis Information:   [place]: [phone]  [place]: [phone]  [place] ([place]) - Adult: [phone]     Child: [phone]  [place] - Adult: [phone]     Child: [phone]  [place]:  [phone]  List of all [facility] resources:   https://[place].gov/dhs/people-we-serve/adults/health-care/mental-health/resources/crisis-contacts.jsp    National Crisis Information:   Crisis Text Line: Text  [place]  to 618178  Suicide & Crisis Lifeline: 988  National Suicide Prevention Lifeline: 5-144-246-TALK (1-552.350.5186)       For online chat options, visit https://suicidepreventionlifeline.org/chat/  Poison Control Center: 5-308-137-0247  Trans Lifeline: 4-228-322-6656 - Hotline for transgender people of all ages  The [place] Project: 7-771-598-5262 - Hotline for LGBT youth     For Non-Emergency Support:   Fast Tracker: Mental Health & Substance Use Disorder Resources -   https://www.[employer]DigitalMRn.org/
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
## 2025-05-06 NOTE — GROUP NOTE
Psychotherapy Group Note    PATIENT'S NAME: Preethi Mays  MRN:   [id number]  :   1960  ACCT. NUMBER: [id number]  DATE OF SERVICE: 25  START TIME: 11:00 AM  END TIME: 11:50 AM  FACILITATOR: Preethi Stone LICSW  TOPIC: MH EBP Group: Specialty Awareness  [facility] Adult Mental Health Outpatient Programs  TRACK: iop/dt4    NUMBER OF PARTICIPANTS: 6    Summary of Group / Topics Discussed:  Specialty Topics: Trauma and PTSD: Patients were provided with information to understand the types and origins of trauma, the relationship between trauma and PTSD, the scope of Trauma-Informed Care, and treatment options. Patients were able to explore how trauma may have impacted their functioning directly or indirectly, with reference to the the complexity of trauma and associated treatment options. Patients reviewed their current awareness of this topic and relevance to their functioning. Individual experiences with symptoms and treatment options were discussed.     Patient Session Goals / Objectives:  Discussed definitions of trauma, Trauma-Informed Care, PTSD  Discussed how traumatic experiences affect the individual and their relationships (directly, indirectly, brain development and function)  Identified how a history of trauma or exposure to trauma may impact group work  Assisted patients to find ways to adapt functioning in light of past traumatic experience(s), and to identify suitable treatment options and community resources      Patient Participation / Response:  Fully participated with the group by sharing personal reflections / insights and openly received / provided feedback with other participants.    Demonstrated understanding of topics discussed through group discussion and participation and Practiced skills in session    Treatment Plan:  Patient has a current master individualized treatment plan.  See Epic treatment plan for more information.    KVNG Shields

## 2025-05-06 NOTE — GROUP NOTE
Psychoeducation Group Note    PATIENT'S NAME: Preethi Mays  MRN:   [id number]  :   1960  ACCT. NUMBER: [id number]  DATE OF SERVICE: 25  START TIME:  9:00 AM  END TIME:  9:50 AM  FACILITATOR: Indra Mercer LPC; Cate Levy OTR  TOPIC:  Life Skills Group: Life Skills  [facility] Adult Mental Health Outpatient Programs  TRACK: IOP/DT-4 TRAUMA  NUMBER OF PARTICIPANTS: 7    Life Skills: Life Skills Clinic:  Provided opportunity for patients to independently choose and engage in a therapeutic activity that supports progress towards their goals and psychiatric recovery. Discussed the skill of behavioral activation and acting opposite to emotion to improve motivation in everyday life tasks.  The Life Skills Clinic provides a context for patients to monitor their symptoms, gain self-awareness, practice skills (self-regulation, mindfulness, self-talk, focus/concentration, social, productivity), build a sense of self efficacy and mastery, as well as receive validation, support, and resources.   Staff checks in, observes, assesses, and monitors performance skills and patterns in context with each group member.      Patient Session Goals and Objectives:  Independently identify a purposeful and meaningful therapeutic activity.  Identified which goal(s) they are intentionally working on during session.   Reflected on their performance and share insight about their progress.   Practiced and identified a way to generalize a skill to their everyday life.     Patient Participation / Response:  Fully participated with the group by sharing personal reflections / insights and openly received / provided feedback with other participants.    Verbalized understanding of content, Patient would benefit from additional opportunities to practice the content to be able to generalize it to their everyday life with increased intentionality, consistency, and efficacy in support of their psychiatric recovery,  and Patient worked towards initial treatment plan goals     Treatment Plan:  Patient has a current master individualized treatment plan.  See Epic treatment plan for more information.    Indra Mercer, LPC

## 2025-05-07 ENCOUNTER — HOSPITAL ENCOUNTER (OUTPATIENT)
Dept: BEHAVIORAL HEALTH | Facility: CLINIC | Age: 65
End: 2025-05-07
Attending: PSYCHIATRY & NEUROLOGY
Payer: COMMERCIAL

## 2025-05-07 PROCEDURE — 90853 GROUP PSYCHOTHERAPY: CPT | Performed by: SOCIAL WORKER

## 2025-05-07 PROCEDURE — 90853 GROUP PSYCHOTHERAPY: CPT

## 2025-05-07 NOTE — GROUP NOTE
Psychotherapy Group Note    PATIENT'S NAME: Preethi Mays  MRN:   [id number]  :   1960  ACCT. NUMBER: [id number]  DATE OF SERVICE: 25  START TIME: 11:00 AM  END TIME: 11:50 AM  FACILITATOR: Eugene Vallejo LMFT  TOPIC: MH EBP Group: Relationship Skills  [facility] Mental Health Outpatient Programs  TRACK: IOPDT4    NUMBER OF PARTICIPANTS: 5    Summary of Group / Topics Discussed:  Relationship Skills: Introduction to relationships: Patients were provided with a general overview of interpersonal relationships and information about how relationships impact trauma symptoms and functioning. The goal of this topic is to help patients identify their current awareness of relationships issues and how relationships impact functioning.      Patient Session Goals / Objectives:  Identified and discussed patients individual challenges with relationships and trauma  Presented and practiced effective communication skills in session  Assisted patients in implementing more effective boundaries relationships    Patient Participation / Response:  Fully participated with the group by sharing personal reflections / insights and openly received / provided feedback with other participants.    Demonstrated understanding of topics discussed through group discussion and participation and Practiced skills in session    Treatment Plan:  Patient has a current master individualized treatment plan.  See Epic treatment plan for more information.    AYESHA Cano

## 2025-05-07 NOTE — GROUP NOTE
Psychoeducation Group Note    PATIENT'S NAME: Preethi Mays  MRN:   [id number]  :   1960  ACCT. NUMBER: [id number]  DATE OF SERVICE: 25  START TIME:  9:00 AM  END TIME:  9:50 AM  FACILITATOR: Teagan Jones LPCC; Cate Levy OTR  TOPIC: MH Life Skills Group: Resiliency Development  [facility] Mental [facility] Outpatient Programs  TRACK: IOP/DT4/7B     NUMBER OF PARTICIPANTS: 5    Summary of Group / Topics Discussed:    Resiliency Development: Coping Cards: Reviewed education on the benefits of developing a robust coping plan to help manage distress and regulate emotions.  Discussed the importance of having easy access to this plan in times of increased symptoms.  Brainstormed with group members to identify coping skills in the categories of sensory strategies, distraction techniques, cognitive strategies, and physical activity.  Instructed patients to select 1-2 skills from each category to include in their deck of coping cards, then provided time and materials for patients to create this deck.  Prompted patients to consider where they might keep this tool and when they might find it most helpful.         Patient Session Goals / Objectives:   Review the benefits of having a robust coping plan to help manage distress and regulate emotions   Develop a deck of personal coping cards to promote self-regulation and independent skill use   Established a plan for practice of these skills in their own environments     Patient Participation / Response:  Fully participated with the group by sharing personal reflections / insights and openly received / provided feedback with other participants.    Demonstrated understanding of content through organizing the created coping cards  and Verbalized understanding of content    Treatment Plan:  Patient has a current master individualized treatment plan.  See Epic treatment plan for more information.    SARAH Hernandez

## 2025-05-07 NOTE — GROUP NOTE
Psychoeducation Group Note    PATIENT'S NAME: Preethi Mays  MRN:   [id number]  :   1960  ACCT. NUMBER: [id number]  DATE OF SERVICE: 25  START TIME: 11:00 AM  END TIME: 11:50 AM  FACILITATOR: Tiffany Tate LICSW  TOPIC: MH Wellness Group: Health Maintenance  [facility] Mental Health Outpatient Programs  TRACK: IOP/DT     NUMBER OF PARTICIPANTS: 7    Summary of Group / Topics Discussed:  Health Maintenance: Goal Setting: Meaningful goals can bring a sense of direction and purpose in life.  They also highlight our most important values. Patients were assisted by instructor to identify short term goals to promote their mental health recovery and improve overall health and wellness. Patients were educated on SMART goal setting framework as a strategy to increase outcomes and promote success.    Patient Session Goals / Objectives:  Explained the key concepts of SMART goal setting framework  Identified three goals successfully using SMART goal setting framework  Reviewed concept of balance in wellness as it pertains to goal setting      Patient Participation / Response:  Fully participated with the group by sharing personal reflections / insights and openly received / provided feedback with other participants.    Demonstrated understanding of topics discussed through group discussion and participation and Verbalized understanding of health maintenance topic    Treatment Plan:  Patient has a current master individualized treatment plan.  See Epic treatment plan for more information.    KVNG Moralez

## 2025-05-07 NOTE — GROUP NOTE
Psychotherapy Group Note    PATIENT'S NAME: Preethi Mays  MRN:   [id number]  :   1960  ACCT. NUMBER: [id number]  DATE OF SERVICE: 25  START TIME: 12:00 PM  END TIME: 12:50 PM  FACILITATOR: Preethi Stone LICSW  TOPIC: MH EBP Group: Specialty Awareness  [facility] Adult Mental Health Outpatient Programs  TRACK: iop/dt4    NUMBER OF PARTICIPANTS: 6    Summary of Group / Topics Discussed:  Specialty Topics: Autobiography: This topic provides each patient with an opportunity to share his/her life story by including background information, significant events that have been life changing, and a means to talk about how mental health and trauma have impacted overall functioning and development.      Patient Session Goals / Objectives:  Patient  had opportunity his/her personal story and give some background on their past and/or listen to another patient share their personal story  Identified ways that mental illness and trauma impacted functioning and development  Examined their lives with trauma    Patient Participation / Response:  Fully participated with the group by sharing personal reflections / insights and openly received / provided feedback with other participants.    Demonstrated understanding of topics discussed through group discussion and participation and Identified / Expressed readiness to act on skill suggestions discussed in topic    Treatment Plan:  Patient has a current master individualized treatment plan.  See Epic treatment plan for more information.    KVNG Shields

## 2025-05-07 NOTE — GROUP NOTE
Process Group Note    PATIENT'S NAME: Preethi Mays  MRN:   [id number]  :   1960  ACCT. NUMBER: [id number]  DATE OF SERVICE: 25  START TIME: 10:00 AM  END TIME: 10:50 AM  FACILITATOR: Preethi Stone LICSW  TOPIC:  Process Group    Diagnoses:  296.32 (F33.1) Major Depressive Disorder, Recurrent Episode, Moderate _ and With anxious distress.  3. Other Diagnoses that is relevant to services:   300.00 (F41.9) Unspecified Anxiety Disorder  309.81 (F43.10) Posttraumatic Stress Disorder (includes Posttraumatic Stress Disorder for Children 6 Years and Younger)  With dissociative symptoms.    [facility]  TRACK: iop/dt4    NUMBER OF PARTICIPANTS: 6        Data:    Session content: At the start of this group, patients were invited to check in by identifying themselves, describing their current emotional status, and identifying issues to address in this group.   Area(s) of treatment focus addressed in this session included Symptom Management and Personal Safety.  Whit shares that she if feeling nervous today as she is preparing for trip to [place] and as part of trip will be seeing estranged daughter. She shares with the group the skills and limits that she is preparing .  Accepted the support from the group    Therapeutic Interventions/Treatment Strategies:  Psychotherapist offered support, feedback and validation and reinforced use of skills. Treatment modalities used include Cognitive Behavioral Therapy and Dialectical Behavioral Therapy.    Assessment:    Patient response:   Patient responded to session by accepting feedback, giving feedback, listening, focusing on goals, being attentive, accepting support, appearing alert, demonstrating behavior change, and verbalizing understanding    Possible barriers to participation / learning include: and no barriers identified    Health Issues:   None reported       Substance Use Review:   Substance Use: No active  concerns identified.    Mental Status/Behavioral Observations  Appearance:   Appropriate   Eye Contact:   Good   Psychomotor Behavior: Normal   Attitude:   Cooperative   Orientation:   All  Speech   Rate / Production: Normal    Volume:  Normal   Mood:    Anxious  Normal  Affect:    Appropriate   Thought Content:   Clear  Thought Form:  Coherent  Goal Directed  Logical     Insight:    Good     Plan:   Safety Plan: No current safety concerns identified.  Recommended that patient call 911 or go to the local ED should there be a change in any of these risk factors.   Barriers to treatment: None identified  Patient Contracts (see media tab):  None  Substance Use: Not addressed in session   Continue or Discharge: Patient will continue in Adult Day Treatment (ADT)  as planned. Patient is likely to benefit from learning and using skills as they work toward the goals identified in their treatment plan.      Preethi Stone, [facility]  May 7, 2025

## 2025-05-13 ENCOUNTER — HOSPITAL ENCOUNTER (OUTPATIENT)
Dept: BEHAVIORAL HEALTH | Facility: CLINIC | Age: 65
End: 2025-05-13
Attending: PSYCHIATRY & NEUROLOGY
Payer: COMMERCIAL

## 2025-05-13 PROCEDURE — 90853 GROUP PSYCHOTHERAPY: CPT | Performed by: COUNSELOR

## 2025-05-13 PROCEDURE — 90853 GROUP PSYCHOTHERAPY: CPT

## 2025-05-13 PROCEDURE — 90853 GROUP PSYCHOTHERAPY: CPT | Performed by: SOCIAL WORKER

## 2025-05-13 NOTE — GROUP NOTE
Psychoeducation Group Note    PATIENT'S NAME: Preethi Mays  MRN:   [id number]  :   1960  ACCT. NUMBER: [id number]  DATE OF SERVICE: 25  START TIME:  9:00 AM  END TIME:  9:50 AM  FACILITATOR: Indra Mercer LPC; Opal Mendoza OTR  TOPIC:  Life Skills Group: Life Skills  [facility] Adult [facility] Health Outpatient Programs  TRACK: IOP/DT-4 TRAUMA  NUMBER OF PARTICIPANTS: 6    Summary of Group and Topics Discussed:  Life Skills: Life Skills Clinic: Provided opportunity for patients to independently choose and engage in a therapeutic activity that supports progress towards their goals and psychiatric recovery. Discussed the skill of behavioral activation and acting opposite to emotion to improve motivation in everyday life tasks. The Life Skills Clinic provides a context for patients to monitor their symptoms, gain self-awareness, practice skills (self-regulation, mindfulness, self-talk, focus/concentration, social, productivity), build a sense of self efficacy and mastery, as well as receive validation, support, and resources. Staff checks in, observes, assesses, and monitors performance skills and patterns in context with each group member.      Patient Session Goals and Objectives:  Independently identify a purposeful and meaningful therapeutic activity.  Identified which goal(s) they are intentionally working on during session.    Reflected on their performance and share insight about their progress.  Practiced and identified a way to generalize a skill to their everyday life.    Patient Participation / Response:  Fully participated with the group by sharing personal reflections / insights and openly received / provided feedback with other participants.    Verbalized understanding of content, Patient would benefit from additional opportunities to practice the content to be able to generalize it to their everyday life with increased intentionality, consistency, and efficacy in support of  their psychiatric recovery, and Patient worked towards initial treatment plan goals     Treatment Plan:  Patient has a current master individualized treatment plan.  See Epic treatment plan for more information.    Indra Mercer, LPC

## 2025-05-13 NOTE — GROUP NOTE
Process Group Note    PATIENT'S NAME: Preethi Mays  MRN:   [id number]  :   1960  ACCT. NUMBER: [id number]  DATE OF SERVICE: 25  START TIME: 10:00 AM  END TIME: 10:50 AM  FACILITATOR: Preethi Stone LICSW; Cal Sanchez MD  TOPIC:  Process Group    Diagnoses:  296.32 (F33.1) Major Depressive Disorder, Recurrent Episode, Moderate _ and With anxious distress.  3. Other Diagnoses that is relevant to services:   300.00 (F41.9) Unspecified Anxiety Disorder  309.81 (F43.10) Posttraumatic Stress Disorder (includes Posttraumatic Stress Disorder for Children 6 Years and Younger)  With dissociative symptoms.    [facility] Adult Mental Health Outpatient Programs  TRACK: iop/dt4    NUMBER OF PARTICIPANTS: 6        Data:    Session content:  At the beginning of group, clients were informed of decision to discontinue trauma track on Thursday.  Medical director provided reasons for decision and clients were given opportunity to ask questions and express concerns.Other: treatment planning..  Whit expressed concern and perspective from an education perspective    Therapeutic Interventions/Treatment Strategies:  Psychotherapist offered support, feedback and validation and reinforced use of skills. Treatment modalities used include Cognitive Behavioral Therapy and Dialectical Behavioral Therapy.    Assessment:    Patient response:   Patient responded to session by accepting feedback, giving feedback, listening, focusing on goals, being attentive, accepting support, appearing alert, and verbalizing understanding    Possible barriers to participation / learning include: and no barriers identified    Health Issues:   None reported       Substance Use Review:   Substance Use: No active concerns identified.    Mental Status/Behavioral Observations  Appearance:   Appropriate   Eye Contact:   Good   Psychomotor Behavior: Normal   Attitude:   Cooperative   Orientation:   All  Speech   Rate /  Production: Normal    Volume:  Normal   Mood:    Anxious  Irritable  Fearful  Affect:    Appropriate   Thought Content:   Clear  Thought Form:  Coherent  Goal Directed  Logical     Insight:    Good     Plan:   Safety Plan: No current safety concerns identified.  Recommended that patient call 911 or go to the local ED should there be a change in any of these risk factors.   Barriers to treatment: None identified  Patient Contracts (see media tab):  None  Substance Use: Not addressed in session   Continue or Discharge: Patient will continue in Adult Day Treatment (ADT)  as planned. Patient is likely to benefit from learning and using skills as they work toward the goals identified in their treatment plan.      Preethi Stone, [facility]  May 13, 2025

## 2025-05-13 NOTE — GROUP NOTE
Psychotherapy Group Note    PATIENT'S NAME: Preethi Mays  MRN:   [id number]  :   1960  ACCT. NUMBER: [id number]  DATE OF SERVICE: 25  START TIME: 11:00 AM  END TIME: 11:50 AM  FACILITATOR: Eugene Vallejo LMFT  TOPIC: MH EBP Group: Coping Skills  [facility] Adult Mental Health Outpatient Programs  TRACK: IOPDT4    NUMBER OF PARTICIPANTS: 6    Summary of Group / Topics Discussed:  Coping Skills: Grounding: Patients discussed and practiced strategies to increase attachment / presence to the current moment.  Patients identified situations in which using these strategies will help improve emotion regulation sense of calm in the body.  Reviewed the benefits of applying grounding strategies, as well as past / current practices of each member.  Patients identified situations in which using these strategies would reduce stress. They developed the ability to distinguish when these strategies can be useful in their lives for management and stress and psychological well-being.    Patient Session Goals / Objectives:  Understand the purpose of using grounding strategies to reduce stress.  Verbalize understanding of how and when to apply grounding strategies to reduce distress and increase presence in the moment.  Review patients current grounding practices and discuss a more formal way of practicing and accessing skills.  Practice using various calming strategies (e.g. 5-4-3-2-1; mental and body awareness).  Choose 1-2 grounding strategies to apply during times of distress.      Patient Participation / Response:  Fully participated with the group by sharing personal reflections / insights and openly received / provided feedback with other participants.    Demonstrated understanding of topics discussed through group discussion and participation and Expressed understanding of the relevance / importance of coping skills at distressing times in life    Treatment Plan:  Patient has a current master  individualized treatment plan.  See Epic treatment plan for more information.    AYESHA Cano

## 2025-05-13 NOTE — GROUP NOTE
Process Group Note    PATIENT'S NAME: Preethi Mays  MRN:   [id number]  :   1960  ACCT. NUMBER: [id number]  DATE OF SERVICE: 25  START TIME: 12:00 PM  END TIME: 12:50 PM  FACILITATOR: Preethi Stone LICSW  TOPIC:  Process Group    Diagnoses:  296.32 (F33.1) Major Depressive Disorder, Recurrent Episode, Moderate _ and With anxious distress.  3. Other Diagnoses that is relevant to services:   300.00 (F41.9) Unspecified Anxiety Disorder  309.81 (F43.10) Posttraumatic Stress Disorder (includes Posttraumatic Stress Disorder for Children 6 Years and Younger)  With dissociative symptoms.    [facility] Outpatient [facility]  TRACK: iop/dt4    NUMBER OF PARTICIPANTS: 5        Data:    Session content: At the start of this group, patients were invited to check in by identifying themselves, describing their current emotional status, and identifying issues to address in this group.   Area(s) of treatment focus addressed in this session included Symptom Management and Personal Safety.  Whit describes feeling dissociated today.  Shares that she is aware of cognitive distortions contributing to mental status.  Takes time to share the ways in which she is challenging self    Therapeutic Interventions/Treatment Strategies:  Psychotherapist offered support, feedback and validation and reinforced use of skills. Treatment modalities used include Cognitive Behavioral Therapy and Dialectical Behavioral Therapy.    Assessment:    Patient response:   Patient responded to session by accepting feedback, giving feedback, listening, focusing on goals, being attentive, accepting support, appearing alert, demonstrating behavior change, and verbalizing understanding    Possible barriers to participation / learning include: and no barriers identified    Health Issues:   None reported       Substance Use Review:   Substance Use: No active concerns identified.    Mental Status/Behavioral  Observations  Appearance:   Appropriate   Eye Contact:   Good   Psychomotor Behavior: Normal   Attitude:   Cooperative   Orientation:   All  Speech   Rate / Production: Hyperverbal  Normal    Volume:  Normal   Mood:    Anxious  Normal  Affect:    Appropriate   Thought Content:   Clear  Thought Form:  Coherent  Goal Directed  Logical     Insight:    Good     Plan:   Safety Plan: No current safety concerns identified.  Recommended that patient call 911 or go to the local ED should there be a change in any of these risk factors.   Barriers to treatment: None identified  Patient Contracts (see media tab):  None  Substance Use: Not addressed in session   Continue or Discharge: Patient will continue in Adult Day Treatment (ADT)  as planned. Patient is likely to benefit from learning and using skills as they work toward the goals identified in their treatment plan.      Preethi Stone, [facility]  May 13, 2025

## 2025-05-14 ENCOUNTER — HOSPITAL ENCOUNTER (OUTPATIENT)
Dept: BEHAVIORAL HEALTH | Facility: CLINIC | Age: 65
End: 2025-05-14
Attending: PSYCHIATRY & NEUROLOGY
Payer: COMMERCIAL

## 2025-05-14 PROCEDURE — 90853 GROUP PSYCHOTHERAPY: CPT

## 2025-05-14 PROCEDURE — 90853 GROUP PSYCHOTHERAPY: CPT | Performed by: PSYCHOLOGIST

## 2025-05-14 NOTE — GROUP NOTE
Psychotherapy Group Note    PATIENT'S NAME: Preethi Mays  MRN:   [id number]  :   1960  ACCT. NUMBER: [id number]  DATE OF SERVICE: 25  START TIME: 12:00 PM  END TIME: 12:50 PM  FACILITATOR: Estefany Hernandez Psy.D, LP  TOPIC:  EBP Group: Self-Awareness  [facility] Out[facility]  TRACK: iop/dt4    NUMBER OF PARTICIPANTS: 4    Summary of Group / Topics Discussed:  Self-Awareness: Self-Compassion: Patients received overview of key concepts in developing self-compassion. Patients discussed mindfulness, self-kindness, and finding common humanity. Patients identified their current approach to problems in their lives and learned skills for increasing self-compassion. Patients identified ways they can put self-compassion skills into practice and problem solve barriers to application of skills.     Patient Session Goals / Objectives:  [place] components of self-compassion  Identify ways to practice self-compassion in daily life  Problem solve barriers to self-compassion practice    Patient Participation / Response:  Fully participated with the group by sharing personal reflections / insights and openly received / provided feedback with other participants.    Demonstrated understanding of values, strengths, and challenges to learn about themselves    Treatment Plan:  Patient has a current master individualized treatment plan.  See Epic treatment plan for more information.    Estefany Hernandez Psy.D, PENELOPE

## 2025-05-14 NOTE — GROUP NOTE
Psychotherapy Group Note    PATIENT'S NAME: Preethi Mays  MRN:   [id number]  :   1960  ACCT. NUMBER: [id number]  DATE OF SERVICE: 25  START TIME: 11:00 AM  END TIME: 11:50 AM  FACILITATOR: Estefany Hernandez Psy.D, LP  TOPIC: MH EBP Group: Specialty [facility] Adult Mental Health Outpatient Programs  TRACK: iop/dt4    NUMBER OF PARTICIPANTS: 4    Summary of Group / Topics Discussed:  Specialty Topics: Life Transitions: The topic of life transitions was presented in order to help patients to better understand the challenges presented by life transitions, and how to best navigate them. Exploring the phases of transition and how one works through them was discussed. Patients were provided with information regarding community resources.     Patient Session Goals / Objectives:  Discussed the timing and nature of major life transitions  Explored how life transitions may impact mental health and functioning  Discussed coping strategies to manage symptoms and help with transitioning  Discussed and planned a successful transition    Patient Participation / Response:  Fully participated with the group by sharing personal reflections / insights and openly received / provided feedback with other participants.    Identified / Expressed readiness to act on skill suggestions discussed in topic    Treatment Plan:  Patient has a current master individualized treatment plan.  See Epic treatment plan for more information.    Estefany Hernandez Psy.D, PENELOPE

## 2025-05-14 NOTE — GROUP NOTE
"Psychoeducation Group Note    PATIENT'S NAME: Preethi Mays  MRN:   [id number]  :   1960  ACCT. NUMBER: [id number]  DATE OF SERVICE: 25  START TIME:  9:00 AM  END TIME:  9:50 AM  FACILITATOR: Teagan Jones LPCC; lAea Pang RN  TOPIC: MH Life Skills Group: Resiliency Development  [facility] Adult Mental Health Outpatient Programs  TRACK: IOP/DT4     NUMBER OF PARTICIPANTS: 5    Summary of Group / Topics Discussed:  Resiliency Development: Resilience. Patients spent time exploring what resiliency means to them. Patients were encouraged to bring to mind someone they look up to in life and assess resilience in that person. Group will discuss ways to foster resiliency--making connections, avoid the tendency to view crises as insurmountable challenges, accept that change is an unavoidable part of life, move toward realistic goals, take decisive actions, look for opportunities for self-discovery, nurture a positive view of yourself, keep things in perspective, maintain a hopeful outlook on life, and take care of yourself.          Patient Session Goals / Objectives:     Patients will identify what it means to them to be \"resilient.\"     Patients will explore ways to develop, foster, and maintain resiliency.      Patients will engage in conversational prompts (taken from above-mentioned ways to develop resilience) in the form of a game to personally consider ways to increase resiliency.     Patient Participation / Response:  Fully participated with the group by sharing personal reflections / insights and openly received / provided feedback with other participants.    Demonstrated understanding of content through sharing about skills developed by attending treatment  and Verbalized understanding of content    Treatment Plan:  Patient has a current master individualized treatment plan.  See Epic treatment plan for more information.    SARAH Hernandez    "

## 2025-05-14 NOTE — GROUP NOTE
"Process Group Note    PATIENT'S NAME: Preethi Mays  MRN:   [id number]  :   1960  ACCT. NUMBER: [id number]  DATE OF SERVICE: 25  START TIME: 10:00 AM  END TIME: 10:50 AM  FACILITATOR: Janie Saucedo LGSW  TOPIC:  Process Group    Diagnoses:  296.32 (F33.1) Major Depressive Disorder, Recurrent Episode, Moderate _ and With anxious distress.  Other Diagnoses that is relevant to services:   300.00 (F41.9) Unspecified Anxiety Disorder  309.81 (F43.10) Posttraumatic Stress Disorder (includes Posttraumatic Stress Disorder for Children 6 Years and Younger)  With dissociative symptoms.    [facility] Adult Mental Health Outpatient Programs  TRACK: IOP/DT 4    NUMBER OF PARTICIPANTS: 5        Data:    Session content: At the start of this group, patients were invited to check in by identifying themselves, describing their current emotional status, and identifying issues to address in this group.   Area(s) of treatment focus addressed in this session included Symptom Management.  Patient reported feeling \"whatever it is that makes your blood pressure spike.\" Patient discussed working toward surviving trivia and managing her overstimulated energy in a loud crowded room. Patient identified \"allowing noise in her ears\" as skills she will use to address her goal(s).Patient reported no safety concerns and/or self-injurious behaviors. Patient reported no substance use. Patient reported she is taking her medications as prescribed. Body observation: \"I am getting over a cold and can tell I am lacking a sense of smell.\"  Whit saw her daughter and new baby for the first time since Feb and discussed how the interaction went with the group.       Therapeutic Interventions/Treatment Strategies:  Psychotherapist offered support, feedback and validation and reinforced use of skills. Treatment modalities used include Motivational Interviewing, Cognitive Behavioral Therapy, and Dialectical Behavioral " Therapy.    Assessment:    Patient response:   Patient responded to session by accepting feedback, giving feedback, listening, focusing on goals, being attentive, accepting support, and appearing alert    Possible barriers to participation / learning include: and no barriers identified    Health Issues:   None reported       Substance Use Review:   Substance Use: No active concerns identified.    Mental Status/Behavioral Observations  Appearance:   Appropriate   Eye Contact:   Good   Psychomotor Behavior: Normal   Attitude:   Cooperative   Orientation:   All  Speech   Rate / Production: Normal    Volume:  Normal   Mood:    Normal  Affect:    Appropriate   Thought Content:   Clear  Thought Form:  Coherent  Logical     Insight:    Good     Plan:   Safety Plan: No current safety concerns identified.  Recommended that patient call 911 or go to the local ED should there be a change in any of these risk factors.   Barriers to treatment: None identified  Patient Contracts (see media tab):  None  Substance Use: Not addressed in session   Continue or Discharge: Patient will continue in Adult Day Treatment (ADT)  as planned. Patient is likely to benefit from learning and using skills as they work toward the goals identified in their treatment plan.      Janie Saucedo, [facility]  May 14, 2025

## 2025-05-15 ENCOUNTER — TELEPHONE (OUTPATIENT)
Dept: BEHAVIORAL HEALTH | Facility: CLINIC | Age: 65
End: 2025-05-15
Payer: COMMERCIAL

## 2025-05-15 ENCOUNTER — HOSPITAL ENCOUNTER (OUTPATIENT)
Dept: BEHAVIORAL HEALTH | Facility: CLINIC | Age: 65
End: 2025-05-15
Attending: PSYCHIATRY & NEUROLOGY
Payer: COMMERCIAL

## 2025-05-15 DIAGNOSIS — F33.1 MODERATE EPISODE OF RECURRENT MAJOR DEPRESSIVE DISORDER (H): Primary | ICD-10-CM

## 2025-05-15 PROCEDURE — 90853 GROUP PSYCHOTHERAPY: CPT

## 2025-05-15 PROCEDURE — 90853 GROUP PSYCHOTHERAPY: CPT | Performed by: SOCIAL WORKER

## 2025-05-15 PROCEDURE — 90853 GROUP PSYCHOTHERAPY: CPT | Performed by: COUNSELOR

## 2025-05-15 ASSESSMENT — COLUMBIA-SUICIDE SEVERITY RATING SCALE - C-SSRS
1. SINCE LAST CONTACT, HAVE YOU WISHED YOU WERE DEAD OR WISHED YOU COULD GO TO SLEEP AND NOT WAKE UP?: NO
2. HAVE YOU ACTUALLY HAD ANY THOUGHTS OF KILLING YOURSELF?: NO

## 2025-05-15 NOTE — GROUP NOTE
"Psychoeducation Group Note    PATIENT'S NAME: Preethi Mays  MRN:   [id number]  :   1960  ACCT. NUMBER: [id number]  DATE OF SERVICE: 5/15/25  START TIME:  9:00 AM  END TIME:  9:50 AM  FACILITATOR: Indra Mercer LPC; Yocasta Gusman RN  TOPIC:  Wellness Group: [facility] Adult Mental Health Outpatient Programs  TRACK: IOPDT4 - TRAUMA    NUMBER OF PARTICIPANTS: 6    Summary of Group / Topics Discussed:  [facility]: Review Polyvagal Theory powerpoint.Review Polyvagal Ladder (Ventral vagal, dorsal vagal, sympathetic). Discuss how \"Play\" may be utilized as a \"Ventral Vagal Marilla\". Discuss questions about Polyvagal Theory.          Patient Participation / Response:  Fully participated with the group by sharing personal reflections / insights and openly received / provided feedback with other participants.    Demonstrated understanding of topics discussed through group discussion and participation and Identified / Expressed personal readiness to practice skills    Treatment Plan:  Patient has a current master individualized treatment plan.  See Epic treatment plan for more information.    Indra Mercer LPC  "

## 2025-05-15 NOTE — PROGRESS NOTES
"  Outpatient Mental Health Program Discharge Summary / Instructions - Adult       PATIENT'S NAME: Preethi Mays   MRN:   [id number]  :   1960    PROGRAM PARTICIPATION: Adult Day Treatment (ADT)  Track: iop/dt4    ADMISSION DATE: 25  DISCHARGE DATE: 5/15/25      Reason for Discharge:   Completed treatment: factors leading to admission have been addressed to the extent that the patient can be safely transitioned to a less intensive level of care.     Diagnosis:   296.32 (F33.1) Major Depressive Disorder, Recurrent Episode, Moderate _ and With anxious distress.  3. Other Diagnoses that is relevant to services:   300.00 (F41.9) Unspecified Anxiety Disorder  309.81 (F43.10) Posttraumatic Stress Disorder (includes Posttraumatic Stress Disorder for Children 6 Years and Younger)  With dissociative symptoms.    Medications: (include name, dose, and frequency)  Per patient:     Buspar 30 mg BID  Sertraline 50     DISCHARGE PLAN / RECOMMENDATIONS TO MANAGE SYMPTOMS AND PREVENT RELAPSE:    Take medications as prescribed.  Medication Management: Provider: Cherrie Lazo  Next Appointment: 2025  Follow up with your providers and appointments.  Next Level of Care / Frequency: individual therapy at [facility]  . Next appointment: 25  Use coping skills:  Distress tolerance strategies:  paced breathing/progressive muscle relaxation and intense exercise:   for 2-3 minutes   Physical activities: go for a walk, yoga, gardening, deep breathing, and stretching   Focus on helpful thoughts: \"I am not too much\"  Do not use illicit drugs.  Avoid alcohol.    PERSONAL SAFETY / CRISIS MANAGEMENT:  Follow your individualized Safety Coping Plan.  Report increased symptoms and safety concerns to your care team.  Call 911 or go to your nearest emergency department.  Use the crisis resources listed below:    Crisis Resources:  Suicide Prevention Lifeline: 3-763-317-TALK (6171)  Crisis Text Line Service (available 24 " hours a day, 7 days a week): Text [place] to 131447  Call  **CRISIS (712821) from a cell phone to talk to a team of professionals who can help you.    Crisis Services By County: Phone Number:   [place]     [phone]   [place]    [phone]   [place] ([place])    [phone]   [place]    [phone]   [place]                                                    [phone] (after hours)   [place]   [phone]   [place]     [phone]       The above discharge plan and recommendations were created to help you manage your mental health and to improve your overall functioning and well-being.     Not following the above recommendations may result in an increase of symptoms, potential safety risks and a need for increased services.    We appreciate the opportunity to work with you and sincerely thank you for choosing [facility].        Your treatment team: IOP/DT 4: Johnson SOMERS, DNP, CNP, APRN, PMHNP-BC;  Cal FONTAINE MD; Preethi GARG LICSW, BC-DMT; Kaitlyn SALVADOR, LICSW; Indra SOMERS LPC, NCC; Yocasta LE RN-BSN; Cate GARCIA, OTR/L          Patient Signature: ______________________________________________________ Date:___________

## 2025-05-15 NOTE — GROUP NOTE
Psychotherapy Group Note    PATIENT'S NAME: Preethi Mays  MRN:   [id number]  :   1960  ACCT. NUMBER: [id number]  DATE OF SERVICE: 5/15/25  START TIME: 12:00 PM  END TIME: 12:50 PM  FACILITATOR: Preethi Stone LICSW  TOPIC: MH EBP Group: Specialty Awareness  [facility] Adult Mental Health Outpatient Programs  TRACK: iop/dt4    NUMBER OF PARTICIPANTS: 6    Summary of Group / Topics Discussed:  Specialty Topics: Autobiography: This topic provides each patient with an opportunity to share his/her life story by including background information, significant events that have been life changing, and a means to talk about how mental health and trauma have impacted overall functioning and development.      Patient Session Goals / Objectives:  Patient  had opportunity his/her personal story and give some background on their past and/or listen to another patient share their personal story  Identified ways that mental illness and trauma have impacted functioning and development  Examined their lives with trauma    Patient Participation / Response:  Fully participated with the group by sharing personal reflections / insights and openly received / provided feedback with other participants.    Demonstrated understanding of topics discussed through group discussion and participation and Practiced skills in session    Treatment Plan:  Patient has See Epic Treatment Plan - Patient is discharging.    KVNG Shields

## 2025-05-15 NOTE — GROUP NOTE
Psychotherapy Group Note    PATIENT'S NAME: Preethi Mays  MRN:   [id number]  :   1960  ACCT. NUMBER: [id number]  DATE OF SERVICE: 5/15/25  START TIME: 11:00 AM  END TIME: 11:50 AM  FACILITATOR: Eugene Vallejo LMFT  TOPIC: MH EBP Group: Specialty Awareness  [facility] Mental Health Outpatient Programs  TRACK: IOPDT4    NUMBER OF PARTICIPANTS: 5    Summary of Group / Topics Discussed:  Specialty Topics: Hope: The topic of hope was presented in order to help patients better understand the symptoms of hopelessness and how to become more hopeful. Patients discussed their current awareness of the topic and relevance to their functioning. Individual experiences with symptoms and treatment options were also discussed. Patients explored options for ongoing/future treatment and symptom management.      Patient Session Goals / Objectives:  Discussed definition of hopelessness  Discussed how hopelessness impacts functioning  Set a plan to utilize skills to reduce hopelessness    Patient Participation / Response:  Fully participated with the group by sharing personal reflections / insights and openly received / provided feedback with other participants.    Demonstrated understanding of topics discussed through group discussion and participation, Identified / Expressed readiness to act on skill suggestions discussed in topic, Verbalized understanding of ways to proactively manage illness, and Practiced skills in session    Treatment Plan:  Patient has See Epic Treatment Plan - Patient is discharging.    AYESHA Cano

## 2025-05-15 NOTE — GROUP NOTE
"Process Group Note    PATIENT'S NAME: Preethi Mays  MRN:   [id number]  :   1960  ACCT. NUMBER: [id number]  DATE OF SERVICE: 5/15/25  START TIME: 10:00 AM  END TIME: 10:50 AM  FACILITATOR: Preethi Stone LICSW  TOPIC:  Process Group    Diagnoses:  296.32 (F33.1) Major Depressive Disorder, Recurrent Episode, Moderate _ and With anxious distress.  3. Other Diagnoses that is relevant to services:   300.00 (F41.9) Unspecified Anxiety Disorder  309.81 (F43.10) Posttraumatic Stress Disorder (includes Posttraumatic Stress Disorder for Children 6 Years and Younger)  With dissociative symptoms.    [facility] Adult Mental Health Outpatient Programs  TRACK: iop//dt4    NUMBER OF PARTICIPANTS: 6        Data:    Session content: At the start of this group, patients were invited to check in by identifying themselves, describing their current emotional status, and identifying issues to address in this group.   Area(s) of treatment focus addressed in this session included Symptom Management and Personal Safety.  Whit describes feeling \"weirdly nostalgic\". Takes time in group to share that she is grateful for \"this community\"  Shares reflections of her journey in this group and accepts well wishes from group members.  States that she will be reminding self that \"I am not too much\"    Therapeutic Interventions/Treatment Strategies:  Psychotherapist offered support, feedback and validation and reinforced use of skills. Treatment modalities used include Cognitive Behavioral Therapy and Dialectical Behavioral Therapy.    Assessment:    Patient response:   Patient responded to session by accepting feedback, giving feedback, listening, focusing on goals, being attentive, accepting support, appearing alert, demonstrating behavior change, and verbalizing understanding    Possible barriers to participation / learning include: and no barriers identified    Health Issues:   None reported       Substance Use " Review:   Substance Use: No active concerns identified.    Mental Status/Behavioral Observations  Appearance:   Appropriate   Eye Contact:   Good   Psychomotor Behavior: Normal   Attitude:   Cooperative   Orientation:   All  Speech   Rate / Production: Normal    Volume:  Normal   Mood:    Anxious  Normal  Affect:    Appropriate   Thought Content:   Clear  Thought Form:  Coherent  Goal Directed  Logical     Insight:    Good     Plan:   Safety Plan: No current safety concerns identified.  Recommended that patient call 911 or go to the local ED should there be a change in any of these risk factors.   Barriers to treatment: None identified  Patient Contracts (see media tab):  None  Substance Use: Not addressed in session   Continue or Discharge: Patient will continue in Adult Day Treatment (ADT)  as planned. Patient is likely to benefit from learning and using skills as they work toward the goals identified in their treatment plan.      Preethi Stone, [facility] Hospital[facility]  May 15, 2025

## 2025-05-15 NOTE — ADDENDUM NOTE
Encounter addended by: Preethi Stone [facility] on: 5/15/2025 4:01 PM   Actions taken: Episode resolved, Problem List modified

## 2025-05-15 NOTE — TELEPHONE ENCOUNTER
----- Message from Bj SHILA sent at 5/15/2025 10:23 AM CDT -----  Regarding: Add Appt for 5/15  Hi [facility] team, Please add an appt for today only, patient arrived for group and is discharging later.Location of programming: [facility] Group: UR Adult MH IOP/DT 4 T Attending Provider: Cal Sanchez Duration of Appointment in minutes: 240 minutes Visit Type: Treatment [870] Thank you!   Sending 30 days supply.  Still waiting for labs.

## 2025-05-15 NOTE — TELEPHONE ENCOUNTER
----- Message from Preethi Stone sent at 5/15/2025 11:47 AM CDT -----  Regarding: discharge amhop  Client is discharging iop/dt4 (T-F 9-1 ) today, please remove further visits

## 2025-05-21 ENCOUNTER — THERAPY VISIT (OUTPATIENT)
Dept: OCCUPATIONAL THERAPY | Facility: CLINIC | Age: 65
End: 2025-05-21
Payer: COMMERCIAL

## 2025-05-21 DIAGNOSIS — R68.89 DECREASED FUNCTIONAL ACTIVITY TOLERANCE: ICD-10-CM

## 2025-05-21 DIAGNOSIS — H83.3X3 SOUND SENSITIVITY IN BOTH EARS: Primary | ICD-10-CM

## 2025-05-21 PROCEDURE — 97165 OT EVAL LOW COMPLEX 30 MIN: CPT | Mod: GO | Performed by: OCCUPATIONAL THERAPIST

## 2025-05-21 PROCEDURE — 97535 SELF CARE MNGMENT TRAINING: CPT | Mod: GO | Performed by: OCCUPATIONAL THERAPIST

## 2025-05-21 NOTE — PROGRESS NOTES
OCCUPATIONAL THERAPY EVALUATION  Type of Visit: Evaluation       Fall Risk Screen:  Have you fallen 2 or more times in the past year?: No  Have you fallen and had an injury in the past year?: No  Is patient receiving Physical Therapy Services?: No    Subjective     Patient self referred to OP OT for the SSP.  She reports just finishing IOP for trauma and the OT at [facility] for the Safe and Sound Program recommended it for her.    Whit reports her trauma started when she was 11 years old.  In 2023, she had a lot of stressors: mother dying, sister dying, retiring, going through cancer treatment for breast cancer and daughter got .  She reports that her mental health started to go downhill since 2023.    She reports she was suicidal this past February and immediately got help [facility]. Patient reports that she gets easily startled, sometimes will vocalize and move her arms.  Easily startles when people approach her.  Is currently doing somatic trauma work with a mental health therapist.        Presenting condition or subjective complaint: Startle at sounds. Safe and sound protocol  Date of onset:      Relevant medical history: Mental Illness; Migraines or headaches   Dates & types of surgery: 2019 lumpectomy, multiple orthopedic surgeries    Prior diagnostic imaging/testing results:       Prior therapy history for the same diagnosis, illness or injury: Yes Intermittent psychotherapy since 2012    Prior Level of Function  Transfers: Independent  Ambulation: Independent  ADL: Independent  IADL: Driving, Finances, Housekeeping, Laundry, Meal preparation, Medication management    Living Environment  Social support: With a significant other or spouse   Type of home: House; Multi-level   Stairs to enter the home: Yes 6 Is there a railing: Yes     Ramp: No   Stairs inside the home: Yes 14 Is there a railing: Yes     Help at home: None  Equipment owned:       Employment: No  Retired , also  "trained as a classical instructor  Hobbies/Interests: Hiking    Patient goals for therapy: Tolerate sudden sounds    Pain assessment: Pain denied     Objective   Difficulty with IADL Performance: Symptom Increase    Difficulty Concentrating at School, Work or Home None   Difficulty Multitasking/Planning Denies any difficulties with multitasking   Busy/Dynamic Environments Patient has Loop earbuds that she may wear in a restaurant.     Path Finding in Busy Environments    Sensory Tolerance Does well with constant noise.  She adjusts with ambient noise.   Startles Easily With sounds that are unexpected.  If she goes to an opera or a [employer] concert, she isn't that bothered by it.   \"It depends if I have access to my coping skills.\"  When she gets overstimulated, she just leaves.     Mood Changes    Is Patient Experiencing Changes in Mood? Anxiety, Depression, Feeling irritable   Is Patient Currently Receiving Treatment for Mental Health? Yes, 3 people on her team (psychiatrist, individual therapist, somatic therapist/trauma yoga)        Fatigue    General Fatigue None        Cognitive Status Examination  Orientation: Oriented to person, place and time   Level of Consciousness: Alert  Follows Commands and Answers Questions: 100% of the time, Follows multi step instructions  Personal Safety and Judgement: Intact  Memory: Patient feels her memory is great.  Is very organized.  Attention: No deficits identified  Organization/Problem Solving: No deficits identified  Executive Function: No deficits identified    SENSATION: UE Sensation WNL    FUNCTIONAL MOBILITY  Assistive Device(s): None  Ambulation: Independent      ACTIVITIES OF DAILY LIVING: Independent    ACTIVITY TOLERANCE: Reports that sleeping isn't great and certain medications are affecting her sleep.  She feels she at night that she is thinking about listening to danger.    INSTRUMENTAL ACTIVITIES OF DAILY LIVING (IADL): Independent with all IADLs.  Reports " "that things \"out of her control\" cause her to have increased stress.      Assessment & Plan   CLINICAL IMPRESSIONS  Medical Diagnosis: Sound sensitivities    Treatment Diagnosis: Decreased functional activity tolerance for IADLs    Impression/Assessment: Pt is a 64 year old female presenting to Occupational Therapy due to sound sensitivities with trauma history.  The following significant findings have been identified: Impaired sensory feedback.  These identified deficits interfere with their ability to perform recreational activities, community mobility, and community or volunteer activities as compared to previous level of function.     Clinical Decision Making (Complexity):  Assessment of Occupational Performance: 1-3 Performance Deficits  Occupational Performance Limitations: shopping, leisure activities, and social participation  Clinical Decision Making (Complexity): Low complexity    PLAN OF CARE  Treatment Interventions:  Interventions: Self-Care/Home Management, Therapeutic Activity, Sensory Integration    Long Term Goals   OT Goal 1  Goal Identifier: 1-SSP  Goal Description: Pt will safely complete Safe and Sound Protocol 5 hour treated sound series using visual scale to rate symptom threshold and grade/modify participation with OTR's assist as needed to reduce sound sensitivity, decrease anxiety and improve tolerance of grocery shopping/family gathering as measured by a reduction in BBCSS score of 20 points.  Target Date: 07/21/25      Frequency of Treatment: 2x/month  Duration of Treatment: 2 months     Recommended Referrals to Other Professionals:   Education Assessment: Learner/Method: Patient;Listening;Reading;No Barriers to Learning  Education Comments: Role of OT/POC, SSP program     Risks and benefits of evaluation/treatment have been explained.   Patient/Family/caregiver agrees with Plan of Care.     Evaluation Time:    OT Eval, Low Complexity Minutes (11947): 25  Signing Clinician: Pricilla Resendiz, " KELBY SOMERS [facility]                                                                                   OUTPATIENT OCCUPATIONAL THERAPY      PLAN OF TREATMENT FOR OUTPATIENT REHABILITATION   Patient's Last Name, First Name, Preethi Mcpherson YOB: 1960   Provider's Name   [facility]   Medical Record No.  [id number]     Onset Date:   Start of Care Date: 05/21/25     Medical Diagnosis:  Sound sensitivities      OT Treatment Diagnosis:  Decreased functional activity tolerance for IADLs Plan of Treatment  Frequency/Duration:2x/month/2 months    Certification date from 05/21/25   To 07/21/25        See note for plan of treatment details and functional goals     Pricilla Resendiz OT                         I CERTIFY THE NEED FOR THESE SERVICES FURNISHED UNDER        THIS PLAN OF TREATMENT AND WHILE UNDER MY CARE .             Physician Signature               Date    X_____________________________________________________                  Referring Provider:  Referred Self    Primary MD: Leonarda Maciel    Initial Assessment  See Epic Evaluation- 05/21/25

## 2025-05-22 ENCOUNTER — THERAPY VISIT (OUTPATIENT)
Dept: OCCUPATIONAL THERAPY | Facility: CLINIC | Age: 65
End: 2025-05-22
Payer: COMMERCIAL

## 2025-05-22 DIAGNOSIS — R68.89 DECREASED FUNCTIONAL ACTIVITY TOLERANCE: ICD-10-CM

## 2025-05-22 DIAGNOSIS — H83.3X3 SOUND SENSITIVITY IN BOTH EARS: Primary | ICD-10-CM

## 2025-06-17 ASSESSMENT — PATIENT HEALTH QUESTIONNAIRE - PHQ9
10. IF YOU CHECKED OFF ANY PROBLEMS, HOW DIFFICULT HAVE THESE PROBLEMS MADE IT FOR YOU TO DO YOUR WORK, TAKE CARE OF THINGS AT HOME, OR GET ALONG WITH OTHER PEOPLE: NOT DIFFICULT AT ALL
SUM OF ALL RESPONSES TO PHQ QUESTIONS 1-9: 3
SUM OF ALL RESPONSES TO PHQ QUESTIONS 1-9: 3

## 2025-06-18 ENCOUNTER — VIRTUAL VISIT (OUTPATIENT)
Dept: PSYCHIATRY | Facility: CLINIC | Age: 65
End: 2025-06-18
Attending: NURSE PRACTITIONER
Payer: COMMERCIAL

## 2025-06-18 DIAGNOSIS — F43.10 PTSD (POST-TRAUMATIC STRESS DISORDER): Primary | ICD-10-CM

## 2025-06-18 DIAGNOSIS — F33.1 MODERATE EPISODE OF RECURRENT MAJOR DEPRESSIVE DISORDER (H): ICD-10-CM

## 2025-06-18 RX ORDER — BUSPIRONE HYDROCHLORIDE 30 MG/1
30 TABLET ORAL 2 TIMES DAILY
Qty: 60 TABLET | Refills: 1 | Status: SHIPPED | OUTPATIENT
Start: 2025-06-18

## 2025-06-18 ASSESSMENT — PAIN SCALES - GENERAL: PAINLEVEL_OUTOF10: NO PAIN (0)

## 2025-06-18 NOTE — NURSING NOTE
Current patient location: [address] [place] [address]    Is the patient currently in the state of [place]? YES    Visit mode: VIDEO    If the visit is dropped, the patient can be reconnected by:VIDEO VISIT: Text to cell phone:   Telephone Information:   Mobile [phone]       Will anyone else be joining the visit? NO  (If patient encounters technical issues they should call [phone]    Are changes needed to the allergy or medication list? No    Are refills needed on medications prescribed by this physician? NO    Rooming Documentation:  Questionnaire(s) completed    Reason for visit: RANDAL HICKSF
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
## 2025-06-18 NOTE — PATIENT INSTRUCTIONS
**For crisis resources, please see the information at the end of this document**   Patient Education    Thank you for coming to the [facility] [place] [facility].     Lab Testing:  If you had lab testing today and your results are reassuring or normal they will be mailed to you or sent through [email or web address] within 7 days. If the lab tests need quick action we will call you with the results. The phone number we will call with results is # [phone]. If this is not the best number please call our clinic and change the number.     Medication Refills:  If you need any refills please call your pharmacy and they will contact us. Our fax number for refills is [phone].   Three business days of notice are needed for general medication refill requests.   Five business days of notice are needed for controlled substance refill requests.   If you need to change to a different pharmacy, please contact the new pharmacy directly. The new pharmacy will help you get your medications transferred.     Contact Us:  Please call [phone] during business hours (8-5:00 M-F).   If you have medication related questions after clinic hours, or on the weekend, please call [phone].     Financial Assistance [phone]   Medical Records [phone]       MENTAL HEALTH CRISIS RESOURCES:  For a emergency help, please call 911 or go to the nearest Emergency Department.     Emergency Walk-In Options:   [facility] Unit @ [facility] ([place]): [phone] - Specialized mental health emergency area designed to be calming  [facility] ([place]): [phone]  [facility] ([place]): [phone]  [facility]): [phone]    [facility] Crisis Information:   [place]: [phone]  [place]: [phone]  [place] ([place]) - Adult: [phone]     Child: [phone]  [place] - Adult: [phone]     Child: [phone]  [place]:  [phone]  List of all [facility] resources:   https://[place].gov/dhs/people-we-serve/adults/health-care/mental-health/resources/crisis-contacts.jsp    National Crisis Information:   Crisis Text Line: Text  [place]  to 053299  Suicide & Crisis Lifeline: 988  National Suicide Prevention Lifeline: 1-741-938-TALK (1-164.350.8335)       For online chat options, visit https://suicidepreventionlifeline.org/chat/  Poison Control Center: 3-352-340-2855  Trans Lifeline: 2-162-923-4194 - Hotline for transgender people of all ages  The [employer]astacio Project: 8-380-512-2991 - Hotline for LGBT youth     For Non-Emergency Support:   Fast Tracker: Mental Health & Substance Use Disorder Resources -   https://www.[employer] Marketsckemploi.usn.org/

## 2025-06-19 ENCOUNTER — PATIENT OUTREACH (OUTPATIENT)
Dept: CARE COORDINATION | Facility: CLINIC | Age: 65
End: 2025-06-19
Payer: COMMERCIAL

## 2025-06-24 ENCOUNTER — THERAPY VISIT (OUTPATIENT)
Dept: OCCUPATIONAL THERAPY | Facility: CLINIC | Age: 65
End: 2025-06-24
Payer: COMMERCIAL

## 2025-06-24 DIAGNOSIS — H83.3X3 SOUND SENSITIVITY IN BOTH EARS: Primary | ICD-10-CM

## 2025-06-24 DIAGNOSIS — R68.89 DECREASED FUNCTIONAL ACTIVITY TOLERANCE: ICD-10-CM

## 2025-06-24 PROCEDURE — 97530 THERAPEUTIC ACTIVITIES: CPT | Mod: GO | Performed by: OCCUPATIONAL THERAPIST

## 2025-07-03 ENCOUNTER — VIRTUAL VISIT (OUTPATIENT)
Facility: CLINIC | Age: 65
End: 2025-07-03
Attending: NURSE PRACTITIONER
Payer: COMMERCIAL

## 2025-07-03 DIAGNOSIS — F43.10 POST-TRAUMATIC STRESS DISORDER, UNSPECIFIED: ICD-10-CM

## 2025-07-03 DIAGNOSIS — F32.A DEPRESSION, UNSPECIFIED DEPRESSION TYPE: Primary | ICD-10-CM

## 2025-07-03 ASSESSMENT — COLUMBIA-SUICIDE SEVERITY RATING SCALE - C-SSRS
TOTAL  NUMBER OF ABORTED OR SELF INTERRUPTED ATTEMPTS SINCE LAST CONTACT: NO
1. SINCE LAST CONTACT, HAVE YOU WISHED YOU WERE DEAD OR WISHED YOU COULD GO TO SLEEP AND NOT WAKE UP?: NO
6. HAVE YOU EVER DONE ANYTHING, STARTED TO DO ANYTHING, OR PREPARED TO DO ANYTHING TO END YOUR LIFE?: NO
SUICIDE, SINCE LAST CONTACT: NO
2. HAVE YOU ACTUALLY HAD ANY THOUGHTS OF KILLING YOURSELF?: NO
TOTAL  NUMBER OF INTERRUPTED ATTEMPTS SINCE LAST CONTACT: NO
ATTEMPT SINCE LAST CONTACT: NO

## 2025-07-03 ASSESSMENT — PATIENT HEALTH QUESTIONNAIRE - PHQ9
SUM OF ALL RESPONSES TO PHQ QUESTIONS 1-9: 2
10. IF YOU CHECKED OFF ANY PROBLEMS, HOW DIFFICULT HAVE THESE PROBLEMS MADE IT FOR YOU TO DO YOUR WORK, TAKE CARE OF THINGS AT HOME, OR GET ALONG WITH OTHER PEOPLE: NOT DIFFICULT AT ALL
SUM OF ALL RESPONSES TO PHQ QUESTIONS 1-9: 2

## 2025-07-03 NOTE — PROGRESS NOTES
[facility]    Integrated Behavioral Health   Mental Health & Addiction Services      Progress Note - Initial [facility] Visit     Patient Name: Preethi Mays    Date: July 3, 2025  Service Type: Consult Note   Visit Start Time: 10:34 AM  Visit End Time:  11:02 AM   Attendees: Patient   Service Modality: Video Visit:      Provider verified identity through the following two step process.  Patient provided:  Patient photo and Patient     Telemedicine Visit: The patient's condition can be safely assessed and treated via synchronous audio and visual telemedicine encounter.      Reason for Telemedicine Visit: Patient has requested telehealth visit    Originating Site (Patient Location): Patient's home    Distant Site (Provider Location): Provider Remote Setting- Home Office    Consent:  The patient/guardian has verbally consented to: the potential risks and benefits of telemedicine (video visit) versus in person care; bill my insurance or make self-payment for services provided; and responsibility for payment of non-covered services.     Patient would like the video invitation sent by:  My Chart    Mode of Communication:  Video Conference via [facility]    Distant Location (Provider):  Off-site    As the provider I attest to compliance with applicable laws and regulations related to telemedicine.     [facility] Visit Activities (Refresh list every visit): NEW         DATA:     Interactive Complexity: No   Crisis: No     Assessments completed:     The following assessments were completed by patient for this visit:  PHQ2:   Phq2 (    [employer],All Rights Reserved. Used With Permission. Developed By Jose Manuel Last,Soco Vásquez,Evans Kirk And Colleagues,With An Educational Eliud From [employer].)    2025 12:53 PM CST - Filed by Patient 10/14/2024  7:50 AM CDT - Filed by Patient   The following questionnaire should only be answered by the patient. Are you the patient? Yes Yes   Over the  last 2 weeks, how often have you been bothered by any of the following problems?     Q1: Little interest or pleasure in doing things Several days Several days   Q2: Feeling down, depressed or hopeless More than half the days Several days   PHQ2 (   1999 [employer],ALL RIGHTS RESERVED. USED WITH PERMISSION. DEVELOPED BY LEILA RICHARDS,CHERYL LAWRENCE,CHAPO ELLIOTT AND COLLEAGUES,WITH AN EDUCATIONAL JESSICA FROM [employer].)     PHQ-2 Score (range: 0 - 6) 3 2   The following questionnaire should only be answered by the patient. Are you the patient? Yes     Over the last 2 weeks, how often have you been bothered by any of the following problems?      1. Little interest or pleasure in doing things More than half the days     2.  Feeling down, depressed, or hopeless More than half the days     3.  Trouble falling or staying asleep, or sleeping too much Nearly every day     4.  Feeling tired or having little energy Several days     5.  Poor appetite or overeating Several days     6.  Feeling bad about yourself - or that you are a failure or have let yourself or your family down Nearly every day     7.  Trouble concentrating on things, such as reading the newspaper or watching television More than half the days     8.  Moving or speaking so slowly that other people could have noticed. Or the opposite - being so fidgety or restless that you have been moving around a lot more than usual Nearly every day     9.  Thoughts that you would be better off dead, or of hurting yourself in some way More than half the days     If you checked off any problems, how difficult have these problems made it for you to do your work, take care of things at home, or get along with other people? Very difficult     PHQ9 TOTAL SCORE (range: 0 - 27) 19 (Moderately severe depression)     In the past two weeks have you had thoughts of suicide or self harm? Yes     Do you have concerns about your personal safety or the safety of others? No      In the past 2 weeks have you thought about a plan or had intention to harm yourself? Yes     In the past 2 weeks have you acted on these thoughts in any way? No         PHQ9:       8/12/2019    10:30 AM 2/17/2025    12:53 PM 2/20/2025    12:31 PM 3/27/2025     7:52 AM 5/6/2025     3:56 PM 6/17/2025     6:53 PM 7/3/2025    10:08 AM   PHQ-9 SCORE   PHQ-9 Total Score MyChart  19 (Moderately severe depression)  5 (Mild depression) 1 (Minimal depression) 3 (Minimal depression) 2 (Minimal depression)   PHQ-9 Total Score 2 19  21 5  1  3  2        Patient-reported     PROMIS 10-Global Health (only subscores and total score):       10/9/2024    10:08 AM 1/3/2025    12:43 PM 2/17/2025     1:14 PM 2/20/2025    12:32 PM 6/17/2025     6:55 PM 6/30/2025     9:59 AM 7/3/2025    10:07 AM   PROMIS-10 Scores Only   Global Mental Health Score    12  16  16    Global Physical Health Score    16  20  20    PROMIS TOTAL - SUBSCORES    28  36  36        Information is confidential and restricted. Go to Review Flowsheets to unlock data.    Patient-reported     [place] Suicide Severity Rating Scale (Lifetime/Recent)      2/13/2025     1:50 PM 2/13/2025     3:25 PM 2/13/2025     8:18 PM 2/13/2025     8:39 PM   [place] Suicide Severity Rating (Lifetime/Recent)   Q1 Wish to be Dead (Lifetime)    Yes   Q2 Non-Specific Active Suicidal Thoughts (Lifetime)    Yes   Q1 Wished to be Dead (Past Month) 1-->yes 1-->yes 1-->yes    Q2 Suicidal Thoughts (Past Month) 1-->yes  1-->yes    Q3 Suicidal Thought Method 1-->yes 1-->yes 1-->yes    Q4 Suicidal Intent without Specific Plan 0-->no 0-->no 0-->no    Q5 Suicide Intent with Specific Plan 1-->yes 1-->yes 0-->no    Q6 Suicide Behavior (Lifetime) 1-->yes 1-->yes  0-->no   If yes to Q6, within past 3 months? 0-->no 0-->no 0-->no    Level of Risk per Screen high risk  high risk  moderate risk     3. Active Suicidal Ideation with any Methods (Not Plan) Without Intent to Act (Lifetime)    Y   4. Active  Suicidal Ideation with Some Intent to Act, Without Specific Plan (Lifetime)    N   5. Active Suicidal Ideation with Specific Plan and Intent (Lifetime)    N   Most Severe Ideation Rating (Lifetime)    3   Most Severe Ideation Rating (Past 1 Month)   3    Description of Most Severe Ideation (Past 1 Month)   Thoughts about overdosing on medications, but no intent or specific plan    Frequency (Lifetime)    2   Frequency (Past 1 Month)   2    Duration (Lifetime)    2   Duration (Past 1 Month)   3    Controllability (Lifetime)    3   Controllability (Past 1 Month)   4    Deterrents (Lifetime)    1   Deterrents (Past 1 Month)   1    Reasons for Ideation (Lifetime)    4   Reasons for Ideation (Past 1 Month)   4    Actual Attempt (Lifetime)    N   Actual Attempt (Past 3 Months)   N    Has subject engaged in non-suicidal self-injurious behavior? (Lifetime)    N   Has subject engaged in non-suicidal self-injurious behavior? (Past 3 Months)   N    Interrupted Attempts (Lifetime)    N   Interrupted Attempts (Past 3 Months)   N    Aborted or Self-Interrupted Attempt (Lifetime)    N   Aborted or Self-Interrupted Attempt (Past 3 Months)   N    Preparatory Acts or Behavior (Lifetime)    N   Preparatory Acts or Behavior (Past 3 Months)   N    Calculated C-SSRS Risk Score (Lifetime/Recent)   No Risk Indicated No Risk Indicated       Data saved with a previous flowsheet row definition     Carter Suicide Severity Rating Scale (Short Version)      2/13/2025     8:39 PM 2/20/2025     3:00 PM 3/11/2025    12:00 PM 4/1/2025     1:00 PM 4/2/2025     1:00 PM 4/8/2025     1:00 PM 5/15/2025    11:00 AM   Carter Suicide Severity Rating (Short Version)   Q6 Suicide Behavior (Lifetime) 0-->no         1. Wish to be Dead (Since Last Contact)  Y N N N N N   2. Non-Specific Active Suicidal Thoughts (Since Last Contact)  Y N N N N N   3. Active Suicidal Ideation with any Methods (Not Plan) Without Intent to Act (Since Last Contact)  Y N N N N     4. Active Suicidal Ideation with Some Intent to Act, Without Specific Plan (Since Last Contact)  N N N N N    5. Active Suicidal Ideation with Specific Plan and Intent (Since Last Contact)  N N N N N    Calculated C-SSRS Risk Score (Since Last Contact)  Moderate Risk No Risk Indicated No Risk Indicated No Risk Indicated No Risk Indicated No Risk Indicated        Referral:   Patient was referred to [facility] by primary care provider.    Reason for referral: determine behavioral health treatment options.      [facility] introduced self and role. Discussed informed consent and limits to confidentiality.     Presenting Concerns/ Current Stressors:   Patient shared she has been in therapy for years and was in IOP in Feb 2025 for about 12 weeks - diagnosed with PTSD and depression - takes medication for her mental health .    Patient reports suicidal ideation has been present but appears as passive thoughts.   Patient disclosed she is actively seeing her old provider, but will bridge with their provider for new provider.   Patient discussed that she feels safe and no current SI.       Therapeutic Interventions:  Psycho-education: Provided psycho-education about patient's behavioral health condition and symptoms. Explained and reviewed treatment options.  Safety: Co-developed safety plan and patient agrees to follow.    Response to treatment interventions:   Patient was receptive to interventions utilized.  Patient was engaged in the therapy process.      Safety Issues and Plan for Safety and Risk Management:     Patient has had a history of suicidal ideation: passive thoughts and active  roughly for 20 off and on and self-injurious behavior: in the past - head banging or kicking things   Patient denies current fears or concerns for personal safety.   Patient reports the following current or recent suicidal ideation or behaviors: last weekend due to stress - passive thoughts.   Patient denies current or recent homicidal ideation or  behaviors.   Patient denies current or recent self injurious behavior or ideation.   Patient denies other safety concerns.   A safety and risk management plan has been developed including: Patient consented to co-developed safety plan.  Safety and risk management plan was completed - see below.  Patient agreed to use safety plan should any safety concerns arise.  A copy was given to the patient.   Patient reports there are firearms in the house. The firearms are secured in a locked space.       ASSESSMENT:   Mental Status:     Appearance:   Appropriate    Eye Contact:   Poor   Psychomotor Behavior: Restless    Attitude:   Cooperative  Friendly Pleasant   Orientation:   All   Speech Rate / Production: Talkative   Volume:   Normal    Mood:    Anxious  Depressed  Sad    Affect:    Worrisome    Thought Content:  Clear    Thought Form:  Coherent    Insight:    Fair         Diagnostic Criteria:      Unspecified Trauma- and Stressor-Related Disorder, Symptoms characteristic of a trauma and stressor related disorder that cause clinically significant distress or impairment in social, occupational, or other important areas of functioning predominate but do not meet the full criteria for any of the disorders in the trauma and stressor related disorders diagnostic class.    Unspecified Depression Disorder , Symptoms characteristic of an depression disorder that caused clinically significant distress or impairment in social, occupational, or other important areas of functioning predominate but do not meet the full criteria for any of the disorders of the depression disorders diagnostic class.        DSM5 Diagnoses: (Sustained by DSM5 Criteria Listed Above)     Diagnoses: 309.9 (F43.9) Unspecified Trauma and Stressor Related Disorder     Psychosocial / Contextual Factors: Trauma History, Relationship Concerns, Interpersonal Concerns, and Limited Social Support       Collateral Reports Completed:   Not Applicable        PLAN:  (Homework, other):     1. Patient was provided:  recommendation to follow through on referrals     2. Provider recommended the following referrals: [facility] Transfer to [facility] or [facility] for female provider for PTSD and Depression (HX of SI) for hybrid. No [facility] bridge per patient.        3. Suicide Risk and Safety Concerns were assessed for Preethi Pro Davon    Safety Plan:   Patient agreed to follow safety plan   [place] Safety Plan      Creation Date: 2/13/25 Last Update Date: 7/3/25      Step 1: Warning signs:    Warning Signs    Thoughts of not wanting to live anymore or of actually killing myself    Spending more time alone or talking less to friends      Step 2: Internal coping strategies - Things I can do to take my mind off my problems without contacting another person:    Strategies    Writing    Listening to music    Reading a book      Step 3: People and social settings that provide distraction:    Name Contact Information     [phone]    Children     Friends          Step 4: People whom I can ask for help during a crisis:    Name Contact Information     [phone]    Sister     Children       Step 5: Professionals or agencies I can contact during a crisis:    Clinician/Agency Name Phone Emergency Contact    [facility] mobile crisis team [phone]     Walk-in behavioral health support [address] [place] [address] [phone]     Therapist      Psychiatrist        Local Emergency Department Emergency Department Address Emergency Department Phone    [facility] [address] [place] [address] [phone]      Suicide Prevention Lifeline Phone: Call or Text 942  Crisis Text Line: Text HOME to 854572     Step 6: Making the environment safer (plan for lethal means safety):   Inform  of urges to overdose on medications. Have  lock or administer medications.     Optional: What is most important to me and worth living for?:    [facility]     [place] Safety Plan. Shelley Laird and Jacob Arrington. Used with permission of the authors.            Kely Whyte [facility], [facility]   July 3, 2025

## 2025-07-23 ENCOUNTER — VIRTUAL VISIT (OUTPATIENT)
Dept: OCCUPATIONAL THERAPY | Facility: CLINIC | Age: 65
End: 2025-07-23
Payer: COMMERCIAL

## 2025-07-23 DIAGNOSIS — H83.3X3 SOUND SENSITIVITY IN BOTH EARS: Primary | ICD-10-CM

## 2025-07-23 DIAGNOSIS — R68.89 DECREASED FUNCTIONAL ACTIVITY TOLERANCE: ICD-10-CM

## 2025-07-23 PROCEDURE — 97530 THERAPEUTIC ACTIVITIES: CPT | Mod: GO | Performed by: OCCUPATIONAL THERAPIST

## 2025-07-23 NOTE — PROGRESS NOTES
[facility]                                                                                   OUTPATIENT OCCUPATIONAL THERAPY    PLAN OF TREATMENT FOR OUTPATIENT REHABILITATION   Patient's Last Name, First Name, Preethi Mpcherson YOB: 1960   Provider's Name   [facility]   Medical Record No.  [id number]     Onset Date:   Start of Care Date: 05/21/25     Medical Diagnosis:  Sound sensitivities      OT Treatment Diagnosis:  Decreased functional activity tolerance for IADLs Plan of Treatment  Frequency/Duration:1 visit/2 days    Certification date from 07/22/25   To 07/23/25        See note for plan of treatment details and functional goals     Pricilla Resendiz OT                         I CERTIFY THE NEED FOR THESE SERVICES FURNISHED UNDER        THIS PLAN OF TREATMENT AND WHILE UNDER MY CARE .             Physician Signature               Date    X_____________________________________________________                  Referring Provider:  Referred Self    Primary MD: Leonarda Maciel     Initial Assessment  See Epic Evaluation- 05/21/25 07/23/25 0500   Appointment Info   Treating Provider Pricilla Resendiz OTR/L   Total/Authorized Visits 4 [employer] MEDICARE [employer]VANTAGE   Medical Diagnosis Sound sensitivities   OT Tx Diagnosis Decreased functional activity tolerance for IADLs   Progress Note/Certification   Start Of Care Date 05/21/25   Therapy Frequency 1 visit   Predicted Duration 2 days   Certification date from 07/22/25   Certification date to 07/23/25   OT Goal 1   Goal Identifier 1-SSP   Goal Description Pt will safely complete Safe and Sound Protocol 5 hour treated sound series using visual scale to rate symptom threshold and grade/modify participation with OTR's assist as needed to reduce sound sensitivity, decrease anxiety and improve tolerance of grocery shopping/family gathering as measured by a reduction in BBCSS score  of 20 points.   Target Date 07/21/25   Date Met 07/23/25   Subjective Report   Subjective Report Pt finished listening to the SSP Core and feels that she is able to be more of an active listener.  Patient reports that she was able to tolerate/didn't notice a large loud group of people at the restaurant.  She also reports that her startling is less.   Objective Measures   Objective Measures Objective Measure 1   Objective Measure 1   Objective Measure BBCSS   Details 80 (improved from 111)   Therapeutic Activity   Therapeutic Activity Minutes (01973) 19   Ther Act 1 - Details Skilled follow up on the SSP Core program with patient noticing significant progress with being an active listener, handling stressful situations better, tolerating busy environments.  Recommend listening to hours 3-5 again (assigned Core-[place] Instrumental as the lyrics were distracting to her).  Pt filled out BBCSS and demonstrating improvement in score from 111 to 80.   Skilled Intervention Skilled facilitation of listening activity to prepare body system for handling high/low stimulation of dynamic environments and adaptive response   Patient Response/Progress pt reports that she notices that she has handled stress a little.   Plan   Plan for next session D/C from OT   Total Session Time   Timed Code Treatment Minutes 19   Total Treatment Time (sum of timed and untimed services) 19         DISCHARGE  Reason for Discharge: Patient has met all goals.    Equipment Issued: SSP Core and Balance Program    Discharge Plan: Patient to continue home program.    Referring Provider:  Referred Self

## 2025-07-30 ENCOUNTER — VIRTUAL VISIT (OUTPATIENT)
Dept: PSYCHIATRY | Facility: CLINIC | Age: 65
End: 2025-07-30
Attending: NURSE PRACTITIONER
Payer: COMMERCIAL

## 2025-07-30 DIAGNOSIS — F43.10 PTSD (POST-TRAUMATIC STRESS DISORDER): ICD-10-CM

## 2025-07-30 PROCEDURE — 1126F AMNT PAIN NOTED NONE PRSNT: CPT | Mod: 95 | Performed by: NURSE PRACTITIONER

## 2025-07-30 PROCEDURE — G2211 COMPLEX E/M VISIT ADD ON: HCPCS | Mod: 95 | Performed by: NURSE PRACTITIONER

## 2025-07-30 PROCEDURE — 98006 SYNCH AUDIO-VIDEO EST MOD 30: CPT | Performed by: NURSE PRACTITIONER

## 2025-07-30 RX ORDER — BUSPIRONE HYDROCHLORIDE 30 MG/1
30 TABLET ORAL 2 TIMES DAILY
Qty: 60 TABLET | Refills: 1 | Status: SHIPPED | OUTPATIENT
Start: 2025-07-30

## 2025-07-30 ASSESSMENT — PATIENT HEALTH QUESTIONNAIRE - PHQ9
10. IF YOU CHECKED OFF ANY PROBLEMS, HOW DIFFICULT HAVE THESE PROBLEMS MADE IT FOR YOU TO DO YOUR WORK, TAKE CARE OF THINGS AT HOME, OR GET ALONG WITH OTHER PEOPLE: NOT DIFFICULT AT ALL
SUM OF ALL RESPONSES TO PHQ QUESTIONS 1-9: 2
SUM OF ALL RESPONSES TO PHQ QUESTIONS 1-9: 2

## 2025-07-30 ASSESSMENT — PAIN SCALES - GENERAL: PAINLEVEL_OUTOF10: NO PAIN (0)

## 2025-07-30 NOTE — NURSING NOTE
Current patient location: [address] [place] [id number]    Is the patient currently in the state of [place]? YES    Visit mode: VIDEO    If the visit is dropped, the patient can be reconnected by:VIDEO VISIT: Text to cell phone:   Telephone Information:   Mobile [phone]       Will anyone else be joining the visit? NO  (If patient encounters technical issues they should call [phone]    Are changes needed to the allergy or medication list? No    Are refills needed on medications prescribed by this physician? YES    Rooming Documentation:  Questionnaire(s) completed    Reason for visit: RANDAL HICKSF

## 2025-07-30 NOTE — PATIENT INSTRUCTIONS
**For crisis resources, please see the information at the end of this document**   Patient Education    Thank you for coming to the [facility]ALTH [facility].     Lab Testing:  If you had lab testing today and your results are reassuring or normal they will be mailed to you or sent through [employer] within 7 days. If the lab tests need quick action we will call you with the results. The phone number we will call with results is # [phone]. If this is not the best number please call our clinic and change the number.     Medication Refills:  If you need any refills please call your pharmacy and they will contact us. Our fax number for refills is [phone].   Three business days of notice are needed for general medication refill requests.   Five business days of notice are needed for controlled substance refill requests.   If you need to change to a different pharmacy, please contact the new pharmacy directly. The new pharmacy will help you get your medications transferred.     Contact Us:  Please call [phone] during business hours (8-5:00 M-F).   If you have medication related questions after clinic hours, or on the weekend, please call [phone].     Financial Assistance [phone]   Medical Records [phone]       MENTAL HEALTH CRISIS RESOURCES:  For a emergency help, please call 911 or go to the nearest Emergency Department.     Emergency Walk-In Options:   EmPATH Unit @ [facility] ([place]): [phone] - Specialized mental health emergency area designed to be calming  [facility] ([place]): [phone]  [facility] ([place]): [phone]  [facility]): [phone]    [facility] Crisis Information:   [place]: [phone]  [place]: [phone]  [place] ([place]) - Adult: [phone]     Child: [phone]  [place] - Adult: [phone]     Child: [phone]  [place]:  [phone]  List of all [facility] resources:   https://[place].gov/dhs/people-we-serve/adults/health-care/mental-health/resources/crisis-contacts.jsp    National Crisis Information:   Crisis Text Line: Text  [place]  to 340732  Suicide & Crisis Lifeline: 988  National Suicide Prevention Lifeline: 6-842-453-TALK (1-934.806.6496)       For online chat options, visit https://suicidepreventionlifeline.org/chat/  Poison Control Center: 8-628-092-9875  Trans Lifeline: 7-702-535-0157 - Hotline for transgender people of all ages  The [place]ast[place] Project: 5-333-272-4821 - Hotline for LGBT youth     For Non-Emergency Support:   Fast Tracker: Mental Health & Substance Use Disorder Resources -   https://www.[employer].org/

## 2025-09-01 ASSESSMENT — PATIENT HEALTH QUESTIONNAIRE - PHQ9
SUM OF ALL RESPONSES TO PHQ QUESTIONS 1-9: 4
10. IF YOU CHECKED OFF ANY PROBLEMS, HOW DIFFICULT HAVE THESE PROBLEMS MADE IT FOR YOU TO DO YOUR WORK, TAKE CARE OF THINGS AT HOME, OR GET ALONG WITH OTHER PEOPLE: NOT DIFFICULT AT ALL
SUM OF ALL RESPONSES TO PHQ QUESTIONS 1-9: 4

## 2025-09-02 ENCOUNTER — VIRTUAL VISIT (OUTPATIENT)
Dept: PSYCHIATRY | Facility: CLINIC | Age: 65
End: 2025-09-02
Attending: NURSE PRACTITIONER
Payer: COMMERCIAL

## 2025-09-02 DIAGNOSIS — F33.1 MODERATE EPISODE OF RECURRENT MAJOR DEPRESSIVE DISORDER (H): Primary | ICD-10-CM

## 2025-09-02 DIAGNOSIS — F43.10 PTSD (POST-TRAUMATIC STRESS DISORDER): ICD-10-CM

## 2025-09-02 PROCEDURE — 98006 SYNCH AUDIO-VIDEO EST MOD 30: CPT | Performed by: NURSE PRACTITIONER

## 2025-09-02 PROCEDURE — G2211 COMPLEX E/M VISIT ADD ON: HCPCS | Mod: 95 | Performed by: NURSE PRACTITIONER

## 2025-09-02 RX ORDER — BUSPIRONE HYDROCHLORIDE 30 MG/1
30 TABLET ORAL 2 TIMES DAILY
Qty: 60 TABLET | Refills: 1 | Status: SHIPPED | OUTPATIENT
Start: 2025-09-02

## 2025-09-02 RX ORDER — BUPROPION HYDROCHLORIDE 100 MG/1
50 TABLET ORAL EVERY MORNING
Qty: 30 TABLET | Refills: 0 | Status: SHIPPED | OUTPATIENT
Start: 2025-09-02

## (undated) DEVICE — SU VICRYL 3-0 SH 27" UND J416H

## (undated) DEVICE — BNDG ELASTIC 4"X5YDS UNSTERILE 6611-40

## (undated) DEVICE — Device

## (undated) DEVICE — GLOVE PROTEXIS POWDER FREE 8.0 ORTHOPEDIC 2D73ET80

## (undated) DEVICE — DRSG STERI STRIP 1/2X4" R1547

## (undated) DEVICE — ADH LIQUID MASTISOL TOPICAL VIAL 2-3ML 0523-48

## (undated) DEVICE — GLOVE PROTEXIS BLUE W/NEU-THERA 8.0  2D73EB80

## (undated) DEVICE — PREP CHLORAPREP 26ML TINTED ORANGE  260815

## (undated) DEVICE — SOL WATER IRRIG 1000ML BOTTLE 2F7114

## (undated) DEVICE — CAST PLASTER SPLINT 3X15" 7393

## (undated) DEVICE — SU MONOCRYL 4-0 PS-2 18" UND Y496G

## (undated) DEVICE — LINEN TOWEL PACK X5 5464

## (undated) DEVICE — GOWN IMPERVIOUS SPECIALTY XL/XLONG 39049

## (undated) DEVICE — DRSG XEROFORM 1X8"

## (undated) DEVICE — SU ETHILON 4-0 FS-2 18" 662H

## (undated) DEVICE — SLING ARM MED 79-99155

## (undated) DEVICE — PACK HAND WRIST SOP15HWFSP

## (undated) DEVICE — DRILL BIT MINI 110MM QC 2.0MM  310.19

## (undated) DEVICE — SOL NACL 0.9% IRRIG 1000ML BOTTLE 07138-09

## (undated) RX ORDER — PROPOFOL 10 MG/ML
INJECTION, EMULSION INTRAVENOUS
Status: DISPENSED
Start: 2018-10-15

## (undated) RX ORDER — FENTANYL CITRATE 50 UG/ML
INJECTION, SOLUTION INTRAMUSCULAR; INTRAVENOUS
Status: DISPENSED
Start: 2020-06-16

## (undated) RX ORDER — FENTANYL CITRATE 50 UG/ML
INJECTION, SOLUTION INTRAMUSCULAR; INTRAVENOUS
Status: DISPENSED
Start: 2018-10-15

## (undated) RX ORDER — BUPIVACAINE HYDROCHLORIDE AND EPINEPHRINE 5; 5 MG/ML; UG/ML
INJECTION, SOLUTION EPIDURAL; INTRACAUDAL; PERINEURAL
Status: DISPENSED
Start: 2018-10-15

## (undated) RX ORDER — CLINDAMYCIN PHOSPHATE 900 MG/50ML
INJECTION, SOLUTION INTRAVENOUS
Status: DISPENSED
Start: 2018-10-15

## (undated) RX ORDER — DIPHENHYDRAMINE HYDROCHLORIDE 50 MG/ML
INJECTION INTRAMUSCULAR; INTRAVENOUS
Status: DISPENSED
Start: 2020-06-16